# Patient Record
Sex: FEMALE | Race: WHITE | NOT HISPANIC OR LATINO | Employment: FULL TIME | URBAN - METROPOLITAN AREA
[De-identification: names, ages, dates, MRNs, and addresses within clinical notes are randomized per-mention and may not be internally consistent; named-entity substitution may affect disease eponyms.]

---

## 2017-02-03 ENCOUNTER — PATIENT MESSAGE (OUTPATIENT)
Dept: DERMATOLOGY | Facility: CLINIC | Age: 37
End: 2017-02-03

## 2017-02-03 DIAGNOSIS — L71.9 ROSACEA: ICD-10-CM

## 2017-02-03 RX ORDER — DOXYCYCLINE HYCLATE 50 MG/1
50 CAPSULE ORAL DAILY
Qty: 30 CAPSULE | Refills: 3 | Status: SHIPPED | OUTPATIENT
Start: 2017-02-03 | End: 2017-03-02 | Stop reason: SDUPTHER

## 2017-02-03 RX ORDER — IVERMECTIN 10 MG/G
1 CREAM TOPICAL DAILY
Qty: 30 G | Refills: 3 | Status: SHIPPED | OUTPATIENT
Start: 2017-02-03 | End: 2017-03-02 | Stop reason: SDUPTHER

## 2017-03-01 ENCOUNTER — PATIENT MESSAGE (OUTPATIENT)
Dept: DERMATOLOGY | Facility: CLINIC | Age: 37
End: 2017-03-01

## 2017-03-02 DIAGNOSIS — L71.9 ROSACEA: ICD-10-CM

## 2017-03-02 RX ORDER — DOXYCYCLINE HYCLATE 50 MG/1
50 CAPSULE ORAL DAILY
Qty: 30 CAPSULE | Refills: 3 | Status: SHIPPED | OUTPATIENT
Start: 2017-03-02 | End: 2017-10-26

## 2017-03-02 RX ORDER — IVERMECTIN 10 MG/G
1 CREAM TOPICAL DAILY
Qty: 30 G | Refills: 3 | Status: SHIPPED | OUTPATIENT
Start: 2017-03-02 | End: 2017-10-26

## 2017-03-03 ENCOUNTER — TELEPHONE (OUTPATIENT)
Dept: DERMATOLOGY | Facility: CLINIC | Age: 37
End: 2017-03-03

## 2017-04-18 ENCOUNTER — OFFICE VISIT (OUTPATIENT)
Dept: OBSTETRICS AND GYNECOLOGY | Facility: CLINIC | Age: 37
End: 2017-04-18
Payer: COMMERCIAL

## 2017-04-18 ENCOUNTER — PROCEDURE VISIT (OUTPATIENT)
Dept: OBSTETRICS AND GYNECOLOGY | Facility: CLINIC | Age: 37
End: 2017-04-18
Payer: COMMERCIAL

## 2017-04-18 ENCOUNTER — LAB VISIT (OUTPATIENT)
Dept: LAB | Facility: OTHER | Age: 37
End: 2017-04-18
Attending: NURSE PRACTITIONER
Payer: COMMERCIAL

## 2017-04-18 VITALS
DIASTOLIC BLOOD PRESSURE: 68 MMHG | BODY MASS INDEX: 21.79 KG/M2 | SYSTOLIC BLOOD PRESSURE: 108 MMHG | HEIGHT: 66 IN | WEIGHT: 135.56 LBS

## 2017-04-18 DIAGNOSIS — Z36.82 ENCOUNTER FOR NUCHAL TRANSLUCENCY TESTING: ICD-10-CM

## 2017-04-18 DIAGNOSIS — N91.5 OLIGOMENORRHEA: ICD-10-CM

## 2017-04-18 DIAGNOSIS — Z11.51 SCREENING FOR HPV (HUMAN PAPILLOMAVIRUS): ICD-10-CM

## 2017-04-18 DIAGNOSIS — Z12.4 PAP SMEAR FOR CERVICAL CANCER SCREENING: ICD-10-CM

## 2017-04-18 DIAGNOSIS — N91.5 OLIGOMENORRHEA: Primary | ICD-10-CM

## 2017-04-18 DIAGNOSIS — Z36.89 ESTABLISH GESTATIONAL AGE, ULTRASOUND: ICD-10-CM

## 2017-04-18 DIAGNOSIS — Z36.9 ANTENATAL SCREENING ENCOUNTER: ICD-10-CM

## 2017-04-18 LAB
ABO + RH BLD: NORMAL
ANION GAP SERPL CALC-SCNC: 6 MMOL/L
BASOPHILS # BLD AUTO: 0.02 K/UL
BASOPHILS NFR BLD: 0.3 %
BLD GP AB SCN CELLS X3 SERPL QL: NORMAL
BUN SERPL-MCNC: 11 MG/DL
CALCIUM SERPL-MCNC: 8.8 MG/DL
CHLORIDE SERPL-SCNC: 106 MMOL/L
CO2 SERPL-SCNC: 27 MMOL/L
CREAT SERPL-MCNC: 0.7 MG/DL
DIFFERENTIAL METHOD: ABNORMAL
EOSINOPHIL # BLD AUTO: 0.1 K/UL
EOSINOPHIL NFR BLD: 1 %
ERYTHROCYTE [DISTWIDTH] IN BLOOD BY AUTOMATED COUNT: 12.4 %
EST. GFR  (AFRICAN AMERICAN): >60 ML/MIN/1.73 M^2
EST. GFR  (NON AFRICAN AMERICAN): >60 ML/MIN/1.73 M^2
GLUCOSE SERPL-MCNC: 79 MG/DL
HCT VFR BLD AUTO: 36.7 %
HGB BLD-MCNC: 12.5 G/DL
LYMPHOCYTES # BLD AUTO: 1.5 K/UL
LYMPHOCYTES NFR BLD: 21.7 %
MCH RBC QN AUTO: 29.6 PG
MCHC RBC AUTO-ENTMCNC: 34.1 %
MCV RBC AUTO: 87 FL
MONOCYTES # BLD AUTO: 0.5 K/UL
MONOCYTES NFR BLD: 7.3 %
NEUTROPHILS # BLD AUTO: 4.8 K/UL
NEUTROPHILS NFR BLD: 69.1 %
PLATELET # BLD AUTO: 228 K/UL
PMV BLD AUTO: 9.8 FL
POTASSIUM SERPL-SCNC: 3.6 MMOL/L
RBC # BLD AUTO: 4.22 M/UL
SODIUM SERPL-SCNC: 139 MMOL/L
WBC # BLD AUTO: 6.96 K/UL

## 2017-04-18 PROCEDURE — 87340 HEPATITIS B SURFACE AG IA: CPT

## 2017-04-18 PROCEDURE — 36415 COLL VENOUS BLD VENIPUNCTURE: CPT

## 2017-04-18 PROCEDURE — 85025 COMPLETE CBC W/AUTO DIFF WBC: CPT

## 2017-04-18 PROCEDURE — 99214 OFFICE O/P EST MOD 30 MIN: CPT | Mod: S$GLB,,, | Performed by: NURSE PRACTITIONER

## 2017-04-18 PROCEDURE — 87624 HPV HI-RISK TYP POOLED RSLT: CPT

## 2017-04-18 PROCEDURE — 87591 N.GONORRHOEAE DNA AMP PROB: CPT

## 2017-04-18 PROCEDURE — 86703 HIV-1/HIV-2 1 RESULT ANTBDY: CPT

## 2017-04-18 PROCEDURE — 87086 URINE CULTURE/COLONY COUNT: CPT

## 2017-04-18 PROCEDURE — 86900 BLOOD TYPING SEROLOGIC ABO: CPT

## 2017-04-18 PROCEDURE — 86901 BLOOD TYPING SEROLOGIC RH(D): CPT

## 2017-04-18 PROCEDURE — 86762 RUBELLA ANTIBODY: CPT

## 2017-04-18 PROCEDURE — 76817 TRANSVAGINAL US OBSTETRIC: CPT | Mod: S$GLB,,, | Performed by: OBSTETRICS & GYNECOLOGY

## 2017-04-18 PROCEDURE — 99999 PR PBB SHADOW E&M-EST. PATIENT-LVL III: CPT | Mod: PBBFAC,,, | Performed by: NURSE PRACTITIONER

## 2017-04-18 PROCEDURE — 80048 BASIC METABOLIC PNL TOTAL CA: CPT

## 2017-04-18 PROCEDURE — 1160F RVW MEDS BY RX/DR IN RCRD: CPT | Mod: S$GLB,,, | Performed by: NURSE PRACTITIONER

## 2017-04-18 PROCEDURE — 88175 CYTOPATH C/V AUTO FLUID REDO: CPT

## 2017-04-18 PROCEDURE — 86592 SYPHILIS TEST NON-TREP QUAL: CPT

## 2017-04-18 PROCEDURE — 30000890 ARUP MISCELLANEOUS TEST 1

## 2017-04-18 NOTE — PROCEDURES
Procedures   Obstetrical ultrasound completed today.  See report in imaging section of [facility].

## 2017-04-18 NOTE — MR AVS SNAPSHOT
[facility] [address]  [address]  [facility] [address]  Phone: [phone]  Fax: [phone]                  Ann-Marie Saavedra   2017 1:00 PM   Office Visit    Description:  Female : 1980   Provider:  Nevin Plunkett NP   Department:  [facility] [address]           Reason for Visit     Oligomenorrhea                To Do List           Future Appointments        Provider Department Dept Phone    2017 1:30 PM ULTRASOUND, [facility] GYN [facility] [address] [phone]      Goals (5 Years of Data)     None      [facility] On [facility]     [facility] On Call Nurse Care Line -  Assistance  Unless otherwise directed by your provider, please contact [facility], our nurse care line that is available for  assistance.     Registered nurses in the [facility] On Call Center provide: appointment scheduling, clinical advisement, health education, and other advisory services.  Call: [phone] (toll free)               Medications           Message regarding Medications     Verify the changes and/or additions to your medication regime listed below are the same as discussed with your clinician today.  If any of these changes or additions are incorrect, please notify your healthcare provider.             Verify that the below list of medications is an accurate representation of the medications you are currently taking.  If none reported, the list may be blank. If incorrect, please contact your healthcare provider. Carry this list with you in case of emergency.           Current Medications     docusate sodium (COLACE) 100 MG capsule Take 1 capsule (100 mg total) by mouth 2 (two) times daily.    doxycycline (VIBRAMYCIN) 50 MG capsule Take 1 capsule (50 mg total) by mouth once daily.    FINACEA 15 % cream Apply topically once daily.    iron polysaccharides (NIFEREX) 150 mg iron Cap Take 1 capsule (150 mg total) by mouth 2 (two) times daily.    ivermectin (SOOLANTRA)  "1 % Crea Apply 1 application topically once daily.    naproxen (NAPROSYN) 500 MG tablet Take 1 tablet (500 mg total) by mouth every 8 (eight) hours as needed (Cramping).    norethindrone (ORTHO MICRONOR) 0.35 mg tablet Take 1 tablet (0.35 mg total) by mouth once daily.    norethindrone-ethinyl estradiol (MICROGESTIN 1/20) 1-20 mg-mcg per tablet Take 1 tablet by mouth once daily.    oxycodone-acetaminophen (PERCOCET) 5-325 mg per tablet Take 1 tablet by mouth every 6 (six) hours as needed.    PRENATAL VIT37/IRON/FOLIC ACID (PRENATA ORAL) Take by mouth.    triamcinolone acetonide 0.1% (KENALOG) 0.1 % ointment Apply to affected area 2-3 times daily           Clinical Reference Information           Your Vitals Were     BP Height Weight Last Period BMI    108/68 5' 6" (1.676 m) 61.5 kg (135 lb 9.3 oz) 02/15/2017 21.88 kg/m2      Blood Pressure          Most Recent Value    BP  108/68      Allergies as of 4/18/2017     No Known Allergies      Immunizations Administered on Date of Encounter - 4/18/2017     None      Language Assistance Services     ATTENTION: Language assistance services are available, free of charge. Please call [phone].      ATENCIÓN: Si habla hollycarmen, tiene a pham disposición servicios gratuitos de asistencia lingüística. Llame al [phone].     MICHELLE Ý: N?u b?n nói Ti?ng Vi?t, có các d?ch v? h? tr? ngôn ng? mi?n phí dành cho b?n. G?i s? [phone].         [facility] [address] complies with applicable Federal civil rights laws and does not discriminate on the basis of race, color, national origin, age, disability, or sex.        "

## 2017-04-18 NOTE — PROGRESS NOTES
"  CC: Positive Pregnancy Test    HISTORY OF PRESENT ILLNESS:    Ann-Marie Saavedra is a 36 y.o. female, ,  Presents today for a routine exam complaining of amenorrhea and positive home urine pregnancy test.  Patient's last menstrual period was 02/15/2017.   She is not currently on any contraception.  Reports nausea- no vomiting. Denies breast tenderness. Denies vaginal bleeding and pelvic pain.  Prior  X1- full term/ uncomplicated pregnancy.  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      ROS:  GENERAL: No weight changes. No swelling. No fatigue. No fever.  CARDIOVASCULAR: No chest pain. No shortness of breath. No leg cramps.   NEUROLOGICAL: No headaches. No vision changes.  BREASTS: No pain. No lumps. No discharge.  ABDOMEN: No pain. No diarrhea. No constipation.  REPRODUCTIVE: No abnormal bleeding.   VULVA: No pain. No lesions. No itching.  VAGINA: No relaxation. No itching. No odor. No discharge. No lesions.  URINARY: No incontinence. No nocturia. No frequency. No dysuria.    MEDICATIONS AND ALLERGIES:  Reviewed        COMPREHENSIVE GYN HISTORY:  PAP History: Denies abnormal Paps.  Infection History: Denies STDs. Denies PID.  Benign History: Denies uterine fibroids. Denies ovarian cysts. Denies endometriosis. Denies other conditions.  Cancer History: Denies cervical cancer. Denies uterine cancer or hyperplasia. Denies ovarian cancer. Denies vulvar cancer or pre-cancer. Denies vaginal cancer or pre-cancer. Denies breast cancer. Denies colon cancer.  Sexual Activity History: Reports currently being sexually active  Menstrual History: None.  Contraception: None    /68  Ht 5' 6" (1.676 m)  Wt 61.5 kg (135 lb 9.3 oz)  LMP 02/15/2017  BMI 21.88 kg/m2    PE:  AFFECT: Calm, alert and oriented X 3. Interactive during exam  GENERAL: Appears well-nourished, well-developed, in no acute distress.  HEAD: Normocephalic, atruamatic  TEETH: Good dentition.  THYROID: No thyromegally   BREASTS: No masses, " skin changes, nipple discharge or adenopathy bilaterally.  SKIN: Normal for race, warm, & dry. No lesions or rashes.  LUNGS: Easy and unlabored, clear to auscultation bilaterally.  HEART: Regular rate and rhythm   ABDOMEN: Soft and nontender without masses or organomegally.  VULVA: No lesions, masses or tenderness.  VAGINA: Moist and well rugated without lesions or discharge.  CERVIX: Moist and pink without lesions, discharge or tenderness.      UTERUS SIZE: 8 week size, nontender and without masses.  ADNEXA: No masses or tenderness.  ESTIMATE OF PELVIC CAPACITY: Adequate  EXTREMITIES: No cyanosis, clubbing or edema. No calf tenderness.  LYMPH NODES: No axillary or inguinal adenopathy.    PROCEDURES:  UPT Positive  Genprobe  Pap      ASSESSMENT/PLAN:  Amenorrhea  Positive urine pregnancy test (DAVID: 17, EGA: 8w6d based on LMP)    -  Routine prenatal care    Nausea and vomiting in pregnancy    -  Education regarding lifestyle and dietary modifications    -  Advised use of B6/Unisom. Pt will notify us if no relief/worsening symptoms, will consider Zofran if needed.      1st TRIMESTER COUNSELING: Discussed all, booklet provided:  Common complaints of pregnancy  HIV and other routine prenatal tests including  genetic screening  Risk factors identified by prenatal history  Oriented to practice - discussed anticipated course of prenatal care & indications for Ultrasound  Childbirth classes/Hospital facilities   Nutrition and weight gain counseling  Toxoplasmosis precautions (Cats/Raw Meat)  Sexual activity and exercise  Environmental/Work hazards  Travel  Tobacco (Ask, Advise, Assess, Assist, and Arrange), as well as alcohol and drug use  Use of any medications (Including supplements, Vitamins, Herbs, or OTC Drugs)  Domestic violence  Seat belt use      TERATOLOGY COUNSELING:   Discussed indications and options for aneuploidy screening - pamphlets given    -  Pt desires NT and orders placed     Dating US  later today  FOLLOW-UP in 4 weeks with Dr. Mary Plunkett, NP    OB/GYN

## 2017-04-19 LAB
C TRACH DNA SPEC QL NAA+PROBE: NOT DETECTED
HBV SURFACE AG SERPL QL IA: NEGATIVE
HIV 1+2 AB+HIV1 P24 AG SERPL QL IA: NEGATIVE
N GONORRHOEA DNA SPEC QL NAA+PROBE: NOT DETECTED
RPR SER QL: NORMAL

## 2017-04-20 LAB
BACTERIA UR CULT: NORMAL
RUBV IGG SER-ACNC: NORMAL IU/ML
RUBV IGG SER-IMP: NORMAL

## 2017-04-23 LAB — ARUP MISCELLANEOUS TEST 1: NORMAL

## 2017-04-24 LAB
HPV16 DNA SPEC QL NAA+PROBE: NEGATIVE
HPV16+18+H RISK 12 DNA CVX-IMP: NEGATIVE
HPV18 DNA SPEC QL NAA+PROBE: NEGATIVE

## 2017-05-02 ENCOUNTER — TELEPHONE (OUTPATIENT)
Dept: OBSTETRICS AND GYNECOLOGY | Facility: CLINIC | Age: 37
End: 2017-05-02

## 2017-05-11 ENCOUNTER — LAB VISIT (OUTPATIENT)
Dept: LAB | Facility: OTHER | Age: 37
End: 2017-05-11
Attending: OBSTETRICS & GYNECOLOGY
Payer: COMMERCIAL

## 2017-05-11 ENCOUNTER — OFFICE VISIT (OUTPATIENT)
Dept: MATERNAL FETAL MEDICINE | Facility: CLINIC | Age: 37
End: 2017-05-11
Payer: COMMERCIAL

## 2017-05-11 ENCOUNTER — RESEARCH ENCOUNTER (OUTPATIENT)
Dept: RESEARCH | Facility: HOSPITAL | Age: 37
End: 2017-05-11

## 2017-05-11 DIAGNOSIS — Z36.82 ENCOUNTER FOR (NT) NUCHAL TRANSLUCENCY SCAN: ICD-10-CM

## 2017-05-11 DIAGNOSIS — Z36.89 ENCOUNTER FOR FETAL ANATOMIC SURVEY: Primary | ICD-10-CM

## 2017-05-11 DIAGNOSIS — O09.521 ELDERLY MULTIGRAVIDA IN FIRST TRIMESTER: ICD-10-CM

## 2017-05-11 DIAGNOSIS — Z36.82 ENCOUNTER FOR NUCHAL TRANSLUCENCY TESTING: ICD-10-CM

## 2017-05-11 PROCEDURE — 99499 UNLISTED E&M SERVICE: CPT | Mod: S$GLB,,, | Performed by: PEDIATRICS

## 2017-05-11 PROCEDURE — 81508 FTL CGEN ABNOR TWO PROTEINS: CPT

## 2017-05-11 PROCEDURE — 76801 OB US < 14 WKS SINGLE FETUS: CPT | Mod: S$GLB,,, | Performed by: PEDIATRICS

## 2017-05-11 PROCEDURE — 36415 COLL VENOUS BLD VENIPUNCTURE: CPT

## 2017-05-11 PROCEDURE — 76813 OB US NUCHAL MEAS 1 GEST: CPT | Mod: S$GLB,,, | Performed by: PEDIATRICS

## 2017-05-11 NOTE — PROGRESS NOTES
2016.165.B TREETOP Consent.     I met with Ms. Saavedra. She is pregnant and here for a visit. I met her in [facility]. We discussed the study in detail, including the purpose, numbers/length, procedures, risk/benefit, cost/payment, contacts (investigators/IRB), alternatives/voluntary nature/withdrawal, confidentiality/HIPPA. Dr. Bello was available for questions. She verbalized understanding. She consented to optional blood storage and genetic sequencing. The consent form was signed on 5/11/17 at 8:30 am. Patient was given a copy of the signed informed consent.    She reported her only medication use is a prenatal vitamin.

## 2017-05-12 PROBLEM — O09.521 ELDERLY MULTIGRAVIDA IN FIRST TRIMESTER: Status: ACTIVE | Noted: 2017-05-12

## 2017-05-15 LAB
B-HCG (M.O.M.) (SS1): NORMAL
CRL MEASUREMENT (SS1): 52.3 MM
DOWN SYNDROME (<1:25) (SS1): NORMAL
DS BASED ON MAT. AGE (SS1): NORMAL
ETHNIC ORIGIN (SS1): NORMAL
GESTATIONAL AGE (DAYS)(SS1): 6
GESTATIONAL AGE (WEEKS)(SS1): 11
HCG (SS1): 88.5 IU/ML
INSULIN DEPEND. DIABETES (SS1): NORMAL
MATERNAL AGE AT EDD (YRS) (SS1): 35
MATERNAL WEIGHT (LBS) (SS1): 135
MULTIPLE GESTATION (SS1): NORMAL
NASAL BONE (SS1): NORMAL
NUCHAL TRANSL. (M.O.M.) (SS1): NORMAL
NUCHAL TRANSLUCENCY (SS1): 1 MM
PAPP-A (M.O.M.) (SS1): NORMAL
PAPP-A (SS1): 309.6 NG/ML
SEQ. SCREEN PART 1: NEGATIVE
SEQUENTIAL SCREEN I INTERP.: NORMAL
TRISOMY 18 (<1:100) (SS1): NORMAL
ULTRASOUND DATE (SS1): NORMAL

## 2017-05-18 ENCOUNTER — INITIAL PRENATAL (OUTPATIENT)
Dept: OBSTETRICS AND GYNECOLOGY | Facility: CLINIC | Age: 37
End: 2017-05-18
Payer: COMMERCIAL

## 2017-05-18 VITALS
DIASTOLIC BLOOD PRESSURE: 60 MMHG | BODY MASS INDEX: 22.17 KG/M2 | WEIGHT: 137.38 LBS | SYSTOLIC BLOOD PRESSURE: 100 MMHG

## 2017-05-18 DIAGNOSIS — Z34.80 SUPERVISION OF OTHER NORMAL PREGNANCY, ANTEPARTUM: Primary | ICD-10-CM

## 2017-05-18 PROCEDURE — 0500F INITIAL PRENATAL CARE VISIT: CPT | Mod: S$GLB,,, | Performed by: OBSTETRICS & GYNECOLOGY

## 2017-05-18 PROCEDURE — 99999 PR PBB SHADOW E&M-EST. PATIENT-LVL III: CPT | Mod: PBBFAC,,, | Performed by: OBSTETRICS & GYNECOLOGY

## 2017-05-18 NOTE — MR AVS SNAPSHOT
[facility] [address]  [address]  [facility] [address]  Phone: [phone]  Fax: [phone]                  Ann-Marie Saavedra   2017 8:30 AM   Initial Prenatal    Description:  Female : 1980   Provider:  Tali Hernandez MD   Department:  [facility] [address]           Reason for Visit     Initial Prenatal Visit                To Do List           Goals (5 Years of Data)     None      [facility]     [facility] On Call Nurse Care Line -  Assistance  Unless otherwise directed by your provider, please contact [facility], our nurse care line that is available for  assistance.     Registered nurses in the [facility] On Call Center provide: appointment scheduling, clinical advisement, health education, and other advisory services.  Call: [phone] (toll free)               Medications           Message regarding Medications     Verify the changes and/or additions to your medication regime listed below are the same as discussed with your clinician today.  If any of these changes or additions are incorrect, please notify your healthcare provider.             Verify that the below list of medications is an accurate representation of the medications you are currently taking.  If none reported, the list may be blank. If incorrect, please contact your healthcare provider. Carry this list with you in case of emergency.           Current Medications     PRENATAL VIT37/IRON/FOLIC ACID (PRENATA ORAL) Take by mouth.    docusate sodium (COLACE) 100 MG capsule Take 1 capsule (100 mg total) by mouth 2 (two) times daily.    doxycycline (VIBRAMYCIN) 50 MG capsule Take 1 capsule (50 mg total) by mouth once daily.    FINACEA 15 % cream Apply topically once daily.    iron polysaccharides (NIFEREX) 150 mg iron Cap Take 1 capsule (150 mg total) by mouth 2 (two) times daily.    ivermectin (SOOLANTRA) 1 % Crea Apply 1 application topically once daily.    naproxen  (NAPROSYN) 500 MG tablet Take 1 tablet (500 mg total) by mouth every 8 (eight) hours as needed (Cramping).    norethindrone (ORTHO MICRONOR) 0.35 mg tablet Take 1 tablet (0.35 mg total) by mouth once daily.    norethindrone-ethinyl estradiol (MICROGESTIN 1/20) 1-20 mg-mcg per tablet Take 1 tablet by mouth once daily.    oxycodone-acetaminophen (PERCOCET) 5-325 mg per tablet Take 1 tablet by mouth every 6 (six) hours as needed.    triamcinolone acetonide 0.1% (KENALOG) 0.1 % ointment Apply to affected area 2-3 times daily           Clinical Reference Information           Prenatal Vitals     Enc. Date GA Prenatal Vitals Prenatal Pulse Pain Level Urine Albumin/Glucose Edema Presentation Dilation/Effacement/Station    5/18/17 13w1d 100/60 / 62.3 kg (137 lb 5.6 oz)    Negative / Negative         Your Vitals Were     BP Weight Last Period BMI       100/60 62.3 kg (137 lb 5.6 oz) 02/15/2017 22.17 kg/m2       Allergies as of 5/18/2017     No Known Allergies      Immunizations Administered on Date of Encounter - 5/18/2017     None      Language Assistance Services     ATTENTION: Language assistance services are available, free of charge. Please call [phone].      ATENCIÓN: Si habla español, tiene a pham disposición servicios gratuitos de asistencia lingüística. Llame al [phone].     MICHELLE Ý: N?u b?n nói Ti?ng Vi?t, có các d?ch v? h? tr? ngôn ng? mi?n phí dành cho b?n. G?i s? [phone].         [facility] [address] complies with applicable Federal civil rights laws and does not discriminate on the basis of race, color, national origin, age, disability, or sex.

## 2017-06-15 ENCOUNTER — ROUTINE PRENATAL (OUTPATIENT)
Dept: OBSTETRICS AND GYNECOLOGY | Facility: CLINIC | Age: 37
End: 2017-06-15
Payer: COMMERCIAL

## 2017-06-15 ENCOUNTER — LAB VISIT (OUTPATIENT)
Dept: LAB | Facility: OTHER | Age: 37
End: 2017-06-15
Attending: OBSTETRICS & GYNECOLOGY
Payer: COMMERCIAL

## 2017-06-15 VITALS — WEIGHT: 142 LBS | BODY MASS INDEX: 22.92 KG/M2 | DIASTOLIC BLOOD PRESSURE: 60 MMHG | SYSTOLIC BLOOD PRESSURE: 108 MMHG

## 2017-06-15 DIAGNOSIS — Z36.9 ANTENATAL SCREENING ENCOUNTER: ICD-10-CM

## 2017-06-15 DIAGNOSIS — Z34.80 SUPERVISION OF OTHER NORMAL PREGNANCY, ANTEPARTUM: Primary | ICD-10-CM

## 2017-06-15 PROCEDURE — 99999 PR PBB SHADOW E&M-EST. PATIENT-LVL II: CPT | Mod: PBBFAC,,, | Performed by: OBSTETRICS & GYNECOLOGY

## 2017-06-15 PROCEDURE — 81511 FTL CGEN ABNOR FOUR ANAL: CPT

## 2017-06-15 PROCEDURE — 36415 COLL VENOUS BLD VENIPUNCTURE: CPT

## 2017-06-15 PROCEDURE — 0502F SUBSEQUENT PRENATAL CARE: CPT | Mod: S$GLB,,, | Performed by: OBSTETRICS & GYNECOLOGY

## 2017-06-15 NOTE — PROGRESS NOTES
Pt doing well. No vaginal bleeding, no loss of fluid, positive fetal movement, no contractions. Bleeding/labor/rom/fmc precautions.     SSII today. RTC 4 weeks.

## 2017-06-19 LAB
# FETUSES US: NORMAL
AFP MOM SERPL: 1.31
AFP SERPL-MCNC: 49.7 NG/ML
AGE AT DELIVERY: 35
B-HCG MOM SERPL: 1.34
B-HCG SERPL-ACNC: 40.4 IU/ML
COLLECT DATE BLD: NORMAL
COLLECT DATE: NORMAL
FET NASAL BONE LENGTH US.MEAS: NORMAL MM
FET NUCHAL FOLD MOM THICKNESS US.MEAS: 0.91
FET NUCHAL FOLD THICKNESS US.MEAS: 1 MM
FET TS 21 RISK FROM MAT AGE: NORMAL
GA (DAYS): 6 D
GA (WEEKS): 16 WK
GA METHOD: NORMAL
GEST. AGE (DAYS) 2ND SAMPLE (SS2): 6
GEST. AGE (WKS) 1ST SAMPLE (SS2): 11
IDDM PATIENT QL: NORMAL
INHIBIN A MOM SERPL: 1
INHIBIN A SERPL-MCNC: 169.9 PG/ML
INTEGRATED SCN PATIENT-IMP: NEGATIVE
PAPP-A MOM SERPL: 0.43
PAPP-A SERPL-MCNC: 309.6 NG/ML
SEQUENTIAL SCREEN PART 2 INTERP: NORMAL
TS 18 RISK FETUS: NORMAL
TS 21 RISK FETUS: NORMAL
U ESTRIOL MOM SERPL: 1.08
U ESTRIOL SERPL-MCNC: 1.03 NG/ML

## 2017-06-30 ENCOUNTER — TELEPHONE (OUTPATIENT)
Dept: RESEARCH | Facility: HOSPITAL | Age: 37
End: 2017-06-30

## 2017-07-06 ENCOUNTER — OFFICE VISIT (OUTPATIENT)
Dept: MATERNAL FETAL MEDICINE | Facility: CLINIC | Age: 37
End: 2017-07-06
Payer: COMMERCIAL

## 2017-07-06 ENCOUNTER — RESEARCH ENCOUNTER (OUTPATIENT)
Dept: RESEARCH | Facility: HOSPITAL | Age: 37
End: 2017-07-06

## 2017-07-06 DIAGNOSIS — O09.523 ELDERLY MULTIGRAVIDA IN THIRD TRIMESTER: Primary | ICD-10-CM

## 2017-07-06 DIAGNOSIS — Z36.89 ENCOUNTER FOR FETAL ANATOMIC SURVEY: ICD-10-CM

## 2017-07-06 DIAGNOSIS — O09.522 ELDERLY MULTIGRAVIDA, SECOND TRIMESTER: ICD-10-CM

## 2017-07-06 PROCEDURE — 99499 UNLISTED E&M SERVICE: CPT | Mod: S$GLB,,, | Performed by: OBSTETRICS & GYNECOLOGY

## 2017-07-06 PROCEDURE — 76811 OB US DETAILED SNGL FETUS: CPT | Mod: S$GLB,,, | Performed by: OBSTETRICS & GYNECOLOGY

## 2017-07-06 NOTE — PROGRESS NOTES
2016.165.B [facility]      I drew Ms. Saavedra's blood in [facility]. After Ms. Saavedra's blood was drawn, she was given her $25.00 gift card.     Blood was drawn at 08:25. Placed in centrifuge at room temperature at 09:00. Minimal hemolysis in both tubes. Tubes were combined at 09:11. Twenty 250 ul aliquots were place in the -80 freezer at 09:16.    Only medications being used are prenatal vitamins.

## 2017-07-06 NOTE — PROGRESS NOTES
OB Ultrasound Report (see PDF version under imaging tab)    Indication  ========    Fetal anatomy survey.    History  ======    Risk Factors  Details: Advanced Maternal Age    Pregnancy History  ===============    Maternal Lab Tests  Test: sequential screen  Result:  Negative (DSR 1:40,000)    Wants to know gender: no    Method  ======    Transabdominal ultrasound examination, [place] E10. View: Good view.    Pregnancy  =========    [place]ham pregnancy. Number of fetuses: 1.    Dating  ======    LMP on: 2/15/2017  Cycle: regular cycle  GA by LMP 20 w + 1 d  DAVID by LMP: 11/22/2017  Ultrasound examination on: 7/6/2017  GA by U/S based upon: AC, BPD, Femur, HC  GA by U/S 19 w + 5 d  DAVID by U/S: 11/25/2017  Assigned: Dating performed on 04/18/2017, based on ultrasound (CRL)  Assigned GA 19 w + 2 d  Assigned DAVID: 11/28/2017    General Evaluation  ===============    Cardiac activity: present.  bpm.  Fetal movements: visualized.  Presentation: breech.  Placenta: anterior.  Umbilical cord: 3 vessel cord, normal.  Amniotic fluid: normal amount.    Fetal Biometry  ===========    Fetal Biometry  BPD 43.8 mm 19w 2d Hadlock  OFD 58.0 mm 20w 2d James  .5 mm 19w 0d Hadlock  .2 mm 20w 6d Hadlock  Femur 30.6 mm 19w 3d Hadlock  Cerebellum tr 18.8 mm 19w 0d Flanagan  CM 3.0 mm  Nuchal fold 4.47 mm  Humerus 29.2 mm 19w 4d James   g  Calculated by: Hadlock (BPD-HC-AC-FL)  EFW (lb) 0 lb  EFW (oz) 12 oz  Cephalic index 0.76  HC / AC 1.03  FL / BPD 0.70  FL / AC 0.19   bpm  Head / Face / Neck   6.6 mm  Nasal bone 6.6 mm    Fetal Anatomy  ===========    Cranium: normal  Midline falx: normal  Cavum septi pellucidi: normal  Cerebellum: normal  Cisterna magna: normal  Head shape: normal  Rt lateral ventricle: normal  Lt lateral ventricle: normal  Rt choroid plexus: normal  Lt choroid plexus: normal  Parenchyma: normal  Third ventricle: normal  Posterior fossa: normal  Cerebellar  lobes: normal  Vermis: normal  Neck: appears normal  Nuchal fold: normal  Lips: normal  Profile: normal  Nose: normal  Maxilla: normal  Mandible: normal  4-chamber view: normal  RVOT: normal  LVOT: normal  Heart / Thorax: Septal views: normal  Situs: normal  Aortic arch: normal  Ductal arch: normal  SVC: normal  IVC: normal  3-vessel view: normal  3-vessel-trachea view: normal  Cardiac position: normal  Cardiac axis: normal  Cardiac size: normal  Cardiac rhythm: normal  Rt lung: normal  Lt lung: normal  Diaphragm: normal  Cord insertion: normal  Stomach: normal  Bladder: normal  Genitals: normal  Abdom. wall: appears normal  Abdom. cavity: normal  Rt kidney: normal  Lt kidney: normal  Liver: normal  Bowel: normal  Cervical spine: normal  Thoracic spine: normal  Lumbar spine: normal  Sacral spine: normal  Rt arm: normal  Lt arm: normal  Rt hand: normal  Lt hand: normal  Rt leg: normal  Lt leg: normal  Rt foot: normal  Lt foot: normal  Position of hands: normal  Position of feet: normal  Wants to know gender: no    Maternal Structures  ===============    Uterus / Cervix  Cervical length 33.5 mm  Other: Uterus and adnexa normal    Impression  =========    A detailed fetal anatomic ultrasound examination was performed today due to the following high risk indication: advanced maternal age  at delivery. No fetal structural abnormalities are identified today, and fetal size is appropriate for EGA. Cervical length is normal.  Placental location is normal without evidence of previa.

## 2017-07-10 ENCOUNTER — ROUTINE PRENATAL (OUTPATIENT)
Dept: OBSTETRICS AND GYNECOLOGY | Facility: CLINIC | Age: 37
End: 2017-07-10
Payer: COMMERCIAL

## 2017-07-10 VITALS
WEIGHT: 145.75 LBS | BODY MASS INDEX: 23.52 KG/M2 | SYSTOLIC BLOOD PRESSURE: 106 MMHG | DIASTOLIC BLOOD PRESSURE: 60 MMHG

## 2017-07-10 DIAGNOSIS — Z34.80 SUPERVISION OF OTHER NORMAL PREGNANCY, ANTEPARTUM: Primary | ICD-10-CM

## 2017-07-10 PROCEDURE — 0502F SUBSEQUENT PRENATAL CARE: CPT | Mod: S$GLB,,, | Performed by: OBSTETRICS & GYNECOLOGY

## 2017-07-10 PROCEDURE — 99999 PR PBB SHADOW E&M-EST. PATIENT-LVL II: CPT | Mod: PBBFAC,,, | Performed by: OBSTETRICS & GYNECOLOGY

## 2017-07-10 NOTE — PROGRESS NOTES
Pt doing well. No vaginal bleeding, no loss of fluid, positive fetal movement, no contractions. Bleeding/labor/rom/fmc precautions.     DM screen next visit.

## 2017-08-07 ENCOUNTER — ROUTINE PRENATAL (OUTPATIENT)
Dept: OBSTETRICS AND GYNECOLOGY | Facility: CLINIC | Age: 37
End: 2017-08-07
Payer: COMMERCIAL

## 2017-08-07 ENCOUNTER — LAB VISIT (OUTPATIENT)
Dept: LAB | Facility: HOSPITAL | Age: 37
End: 2017-08-07
Attending: OBSTETRICS & GYNECOLOGY
Payer: COMMERCIAL

## 2017-08-07 VITALS — WEIGHT: 146.81 LBS | BODY MASS INDEX: 23.7 KG/M2 | SYSTOLIC BLOOD PRESSURE: 94 MMHG | DIASTOLIC BLOOD PRESSURE: 62 MMHG

## 2017-08-07 DIAGNOSIS — Z34.80 SUPERVISION OF OTHER NORMAL PREGNANCY, ANTEPARTUM: Primary | ICD-10-CM

## 2017-08-07 DIAGNOSIS — Z34.80 SUPERVISION OF OTHER NORMAL PREGNANCY, ANTEPARTUM: ICD-10-CM

## 2017-08-07 DIAGNOSIS — Z23 NEED FOR DIPHTHERIA-TETANUS-PERTUSSIS (TDAP) VACCINE: ICD-10-CM

## 2017-08-07 LAB — GLUCOSE SERPL-MCNC: 112 MG/DL

## 2017-08-07 PROCEDURE — 82950 GLUCOSE TEST: CPT

## 2017-08-07 PROCEDURE — 36415 COLL VENOUS BLD VENIPUNCTURE: CPT | Mod: PO

## 2017-08-07 PROCEDURE — 0502F SUBSEQUENT PRENATAL CARE: CPT | Mod: S$GLB,,, | Performed by: OBSTETRICS & GYNECOLOGY

## 2017-08-07 PROCEDURE — 99999 PR PBB SHADOW E&M-EST. PATIENT-LVL III: CPT | Mod: PBBFAC,,, | Performed by: OBSTETRICS & GYNECOLOGY

## 2017-08-07 NOTE — PROGRESS NOTES
Pt doing well. No vaginal bleeding, no loss of fluid, positive fetal movement, no contractions. Bleeding/labor/rom/fmc precautions.     DM screen today. Tdap next visit. RTC 4 weeks.

## 2017-09-07 ENCOUNTER — CLINICAL SUPPORT (OUTPATIENT)
Dept: OBSTETRICS AND GYNECOLOGY | Facility: CLINIC | Age: 37
End: 2017-09-07
Payer: COMMERCIAL

## 2017-09-07 ENCOUNTER — ROUTINE PRENATAL (OUTPATIENT)
Dept: OBSTETRICS AND GYNECOLOGY | Facility: CLINIC | Age: 37
End: 2017-09-07
Payer: COMMERCIAL

## 2017-09-07 VITALS
DIASTOLIC BLOOD PRESSURE: 64 MMHG | BODY MASS INDEX: 24.59 KG/M2 | SYSTOLIC BLOOD PRESSURE: 100 MMHG | WEIGHT: 152.31 LBS

## 2017-09-07 DIAGNOSIS — Z23 NEED FOR DIPHTHERIA-TETANUS-PERTUSSIS (TDAP) VACCINE: ICD-10-CM

## 2017-09-07 DIAGNOSIS — Z34.80 SUPERVISION OF OTHER NORMAL PREGNANCY, ANTEPARTUM: Primary | ICD-10-CM

## 2017-09-07 PROCEDURE — 0502F SUBSEQUENT PRENATAL CARE: CPT | Mod: S$GLB,,, | Performed by: OBSTETRICS & GYNECOLOGY

## 2017-09-07 PROCEDURE — 90471 IMMUNIZATION ADMIN: CPT | Mod: S$GLB,,, | Performed by: OBSTETRICS & GYNECOLOGY

## 2017-09-07 PROCEDURE — 90715 TDAP VACCINE 7 YRS/> IM: CPT | Mod: S$GLB,,, | Performed by: OBSTETRICS & GYNECOLOGY

## 2017-09-07 PROCEDURE — 99999 PR PBB SHADOW E&M-EST. PATIENT-LVL III: CPT | Mod: PBBFAC,,, | Performed by: OBSTETRICS & GYNECOLOGY

## 2017-09-07 PROCEDURE — 99999 PR PBB SHADOW E&M-EST. PATIENT-LVL II: CPT | Mod: PBBFAC,,,

## 2017-09-07 NOTE — PROGRESS NOTES
Pt doing well. No vaginal bleeding, no loss of fluid, positive fetal movement, no contractions. Bleeding/labor/rom/fmc precautions. Feeling more pressure.     Tdap today. RTC 3-4 weeks.

## 2017-10-05 ENCOUNTER — ROUTINE PRENATAL (OUTPATIENT)
Dept: OBSTETRICS AND GYNECOLOGY | Facility: CLINIC | Age: 37
End: 2017-10-05
Payer: COMMERCIAL

## 2017-10-05 ENCOUNTER — OFFICE VISIT (OUTPATIENT)
Dept: MATERNAL FETAL MEDICINE | Facility: CLINIC | Age: 37
End: 2017-10-05
Attending: OBSTETRICS & GYNECOLOGY
Payer: COMMERCIAL

## 2017-10-05 VITALS
DIASTOLIC BLOOD PRESSURE: 64 MMHG | SYSTOLIC BLOOD PRESSURE: 102 MMHG | BODY MASS INDEX: 24.87 KG/M2 | WEIGHT: 154.13 LBS

## 2017-10-05 DIAGNOSIS — O09.523 ELDERLY MULTIGRAVIDA IN THIRD TRIMESTER: ICD-10-CM

## 2017-10-05 DIAGNOSIS — Z34.80 SUPERVISION OF OTHER NORMAL PREGNANCY, ANTEPARTUM: Primary | ICD-10-CM

## 2017-10-05 PROCEDURE — 99499 UNLISTED E&M SERVICE: CPT | Mod: S$GLB,,, | Performed by: OBSTETRICS & GYNECOLOGY

## 2017-10-05 PROCEDURE — 0502F SUBSEQUENT PRENATAL CARE: CPT | Mod: S$GLB,,, | Performed by: OBSTETRICS & GYNECOLOGY

## 2017-10-05 PROCEDURE — 76816 OB US FOLLOW-UP PER FETUS: CPT | Mod: S$GLB,,, | Performed by: OBSTETRICS & GYNECOLOGY

## 2017-10-05 PROCEDURE — 99999 PR PBB SHADOW E&M-EST. PATIENT-LVL II: CPT | Mod: PBBFAC,,, | Performed by: OBSTETRICS & GYNECOLOGY

## 2017-10-05 NOTE — PROGRESS NOTES
Pt doing well. No vaginal bleeding, no loss of fluid, positive fetal movement, no contractions. Bleeding/labor/rom/fmc precautions. Labs, SPADD next visit. RTC 3 weeks.

## 2017-10-05 NOTE — PROGRESS NOTES
OB Ultrasound Report (see PDF version under imaging tab)    Indication  ========    Evaluation of fetal growth.    History  ======    Risk Factors  Details: Advanced Maternal Age    Pregnancy History  ===============    Maternal Lab Tests  Test: sequential screen  Result:  Negative (DSR 1:40,000)    Wants to know gender: no    Method  ======    Transabdominal ultrasound examination.    Pregnancy  =========    [place] pregnancy. Number of fetuses: 1.    Dating  ======    LMP on: 2/15/2017  Cycle: regular cycle  GA by LMP 33 w + 1 d  DAVID by LMP: 11/22/2017  Ultrasound examination on: 10/5/2017  GA by U/S based upon: AC, BPD, Femur, HC  GA by U/S 32 w + 5 d  DAVID by U/S: 11/25/2017  Assigned: Dating performed on 04/18/2017, based on ultrasound (CRL)  Assigned GA 32 w + 2 d  Assigned DAVID: 11/28/2017    General Evaluation  ===============    Cardiac activity: present.  bpm.  Fetal movements: visualized.  Presentation: cephalic.  Placenta: anterior.  Umbilical cord: 3 vessel cord.  Amniotic fluid: Amount of AF: normal amount. MVP 4.9 cm.    Fetal Biometry  ===========    Fetal Biometry  BPD 81.2 mm 32w 4d Hadlock  .7 mm 35w 6d James  .4 mm 33w 6d Hadlock  .8 mm 32w 4d Hadlock  Femur 60.8 mm 31w 4d Hadlock  EFW 1,961 g 29% Prabhu  Calculated by: Hadlock (BPD-HC-AC-FL)  EFW (lb) 4 lb  EFW (oz) 5 oz  Cephalic index 0.75  HC / AC 1.07  FL / BPD 0.75  FL / AC 0.21  MVP 4.9 cm   bpm  Head / Face / Neck   1.9 mm    Fetal Anatomy  ===========    Cranium: normal  Posterior fossa: normal  4-chamber view: normal  Stomach: normal  Kidneys: normal  Bladder: normal  Wants to know gender: no  Other: A full anatomy survey previously performed.    Impression  =========    Fetal size is AGA with the EFW at the 29th percentile.  Normal repeat limited fetal anatomic survey. AFV is normal. Follow-up ultrasound as clinically indicated.

## 2017-10-05 NOTE — LETTER
October 5, 2017      Tali Hernandez MD  [address] [address]           [facility]  [address]  [facility] [address]  Phone: [phone]          Patient: Ann-Marie Saavedra   MR Number: [id number]   YOB: 1980   Date of Visit: 10/5/2017       Dear Dr. Tali Hernandez:    Thank you for referring Ann-Marie Saavedra to me for evaluation. Attached you will find relevant portions of my assessment and plan of care.    If you have questions, please do not hesitate to call me. I look forward to following Ann-Marie Saavedra along with you.    Sincerely,    Alessia Saxena MD    Enclosure  CC:  No Recipients    If you would like to receive this communication electronically, please contact [email or web address] or [phone] to request more information on [employer] access.    For providers and/or their staff who would like to refer a patient to [facility], please contact us through our one-stop-shop provider referral line, [facility], at [phone].    If you feel you have received this communication in error or would no longer like to receive these types of communications, please e-mail [email or web address]

## 2017-10-26 ENCOUNTER — IMMUNIZATION (OUTPATIENT)
Dept: OBSTETRICS AND GYNECOLOGY | Facility: CLINIC | Age: 37
End: 2017-10-26
Payer: COMMERCIAL

## 2017-10-26 ENCOUNTER — ROUTINE PRENATAL (OUTPATIENT)
Dept: OBSTETRICS AND GYNECOLOGY | Facility: CLINIC | Age: 37
End: 2017-10-26
Payer: COMMERCIAL

## 2017-10-26 ENCOUNTER — LAB VISIT (OUTPATIENT)
Dept: LAB | Facility: OTHER | Age: 37
End: 2017-10-26
Attending: OBSTETRICS & GYNECOLOGY
Payer: COMMERCIAL

## 2017-10-26 VITALS — SYSTOLIC BLOOD PRESSURE: 98 MMHG | WEIGHT: 157.19 LBS | BODY MASS INDEX: 25.37 KG/M2 | DIASTOLIC BLOOD PRESSURE: 60 MMHG

## 2017-10-26 DIAGNOSIS — Z34.80 SUPERVISION OF OTHER NORMAL PREGNANCY, ANTEPARTUM: Primary | ICD-10-CM

## 2017-10-26 DIAGNOSIS — Z34.80 SUPERVISION OF OTHER NORMAL PREGNANCY, ANTEPARTUM: ICD-10-CM

## 2017-10-26 LAB
ANISOCYTOSIS BLD QL SMEAR: SLIGHT
BASOPHILS # BLD AUTO: 0.04 K/UL
BASOPHILS NFR BLD: 0.4 %
DIFFERENTIAL METHOD: ABNORMAL
EOSINOPHIL # BLD AUTO: 0.1 K/UL
EOSINOPHIL NFR BLD: 1.3 %
ERYTHROCYTE [DISTWIDTH] IN BLOOD BY AUTOMATED COUNT: 12.4 %
GIANT PLATELETS BLD QL SMEAR: PRESENT
HCT VFR BLD AUTO: 34.9 %
HGB BLD-MCNC: 11.6 G/DL
HIV 1+2 AB+HIV1 P24 AG SERPL QL IA: NEGATIVE
LYMPHOCYTES # BLD AUTO: 1.3 K/UL
LYMPHOCYTES NFR BLD: 14.2 %
MCH RBC QN AUTO: 29.4 PG
MCHC RBC AUTO-ENTMCNC: 33.2 G/DL
MCV RBC AUTO: 89 FL
MONOCYTES # BLD AUTO: 0.6 K/UL
MONOCYTES NFR BLD: 6.6 %
NEUTROPHILS # BLD AUTO: 6.7 K/UL
NEUTROPHILS NFR BLD: 77.5 %
PLATELET # BLD AUTO: 259 K/UL
PLATELET BLD QL SMEAR: ABNORMAL
PMV BLD AUTO: 9.8 FL
POLYCHROMASIA BLD QL SMEAR: ABNORMAL
RBC # BLD AUTO: 3.94 M/UL
WBC # BLD AUTO: 9.21 K/UL

## 2017-10-26 PROCEDURE — 99999 PR PBB SHADOW E&M-EST. PATIENT-LVL II: CPT | Mod: PBBFAC,,, | Performed by: NURSE PRACTITIONER

## 2017-10-26 PROCEDURE — 87184 SC STD DISK METHOD PER PLATE: CPT

## 2017-10-26 PROCEDURE — 90686 IIV4 VACC NO PRSV 0.5 ML IM: CPT | Mod: S$GLB,,, | Performed by: OBSTETRICS & GYNECOLOGY

## 2017-10-26 PROCEDURE — 86703 HIV-1/HIV-2 1 RESULT ANTBDY: CPT

## 2017-10-26 PROCEDURE — 87081 CULTURE SCREEN ONLY: CPT

## 2017-10-26 PROCEDURE — 87147 CULTURE TYPE IMMUNOLOGIC: CPT

## 2017-10-26 PROCEDURE — 0502F SUBSEQUENT PRENATAL CARE: CPT | Mod: S$GLB,,, | Performed by: NURSE PRACTITIONER

## 2017-10-26 PROCEDURE — 36415 COLL VENOUS BLD VENIPUNCTURE: CPT

## 2017-10-26 PROCEDURE — 90471 IMMUNIZATION ADMIN: CPT | Mod: S$GLB,,, | Performed by: OBSTETRICS & GYNECOLOGY

## 2017-10-26 PROCEDURE — 86592 SYPHILIS TEST NON-TREP QUAL: CPT

## 2017-10-26 PROCEDURE — 85025 COMPLETE CBC W/AUTO DIFF WBC: CPT

## 2017-10-26 NOTE — PROGRESS NOTES
Here for routine OB appt at 35w2d, with no complaints.  Reports good FM.  Denies LOF, denies VB, denies contractions.  Reviewed warning signs of Labor and Preeclampsia.  Daily FM counts reinforced.  Plans to breastfeed- has pump.   GBS, T3 labs, SPADD and flu vaccine todat  F/U scheduled 1 week

## 2017-10-27 LAB — RPR SER QL: NORMAL

## 2017-10-28 LAB — BACTERIA SPEC AEROBE CULT: NORMAL

## 2017-11-09 ENCOUNTER — ROUTINE PRENATAL (OUTPATIENT)
Dept: OBSTETRICS AND GYNECOLOGY | Facility: CLINIC | Age: 37
End: 2017-11-09
Payer: COMMERCIAL

## 2017-11-09 VITALS
SYSTOLIC BLOOD PRESSURE: 108 MMHG | DIASTOLIC BLOOD PRESSURE: 64 MMHG | WEIGHT: 158.75 LBS | BODY MASS INDEX: 25.62 KG/M2

## 2017-11-09 DIAGNOSIS — Z34.80 SUPERVISION OF OTHER NORMAL PREGNANCY, ANTEPARTUM: Primary | ICD-10-CM

## 2017-11-09 PROCEDURE — 99999 PR PBB SHADOW E&M-EST. PATIENT-LVL II: CPT | Mod: PBBFAC,,, | Performed by: OBSTETRICS & GYNECOLOGY

## 2017-11-09 PROCEDURE — 0502F SUBSEQUENT PRENATAL CARE: CPT | Mod: S$GLB,,, | Performed by: OBSTETRICS & GYNECOLOGY

## 2017-11-09 NOTE — PROGRESS NOTES
Pt doing well. No vaginal bleeding, no loss of fluid, positive fetal movement, no contractions. Bleeding/labor/rom/fmc precautions.     Induction at 39 weeks per patient request.

## 2017-11-16 ENCOUNTER — ROUTINE PRENATAL (OUTPATIENT)
Dept: OBSTETRICS AND GYNECOLOGY | Facility: CLINIC | Age: 37
End: 2017-11-16
Payer: COMMERCIAL

## 2017-11-16 VITALS — SYSTOLIC BLOOD PRESSURE: 112 MMHG | DIASTOLIC BLOOD PRESSURE: 60 MMHG | BODY MASS INDEX: 25.58 KG/M2 | WEIGHT: 158.5 LBS

## 2017-11-16 DIAGNOSIS — Z34.80 SUPERVISION OF OTHER NORMAL PREGNANCY, ANTEPARTUM: Primary | ICD-10-CM

## 2017-11-16 PROCEDURE — 0502F SUBSEQUENT PRENATAL CARE: CPT | Mod: S$GLB,,, | Performed by: OBSTETRICS & GYNECOLOGY

## 2017-11-16 PROCEDURE — 99999 PR PBB SHADOW E&M-EST. PATIENT-LVL II: CPT | Mod: PBBFAC,,, | Performed by: OBSTETRICS & GYNECOLOGY

## 2017-11-16 NOTE — PROGRESS NOTES
Pt doing well. No vaginal bleeding, no loss of fluid, positive fetal movement, no contractions. Bleeding/labor/rom/fmc precautions.     Induction Tuesday

## 2017-11-20 PROBLEM — A49.1 GBS (GROUP B STREPTOCOCCUS) INFECTION: Status: ACTIVE | Noted: 2017-11-20

## 2017-11-20 NOTE — HOSPITAL COURSE
2017 Admit to L&D for IOL with pitocin/AROM as indicated.   2017 - PPD #1 s/p . Doing well and meeting PP milestones.   2017- PPD#2 s/p . Meeting PP milestones

## 2017-11-20 NOTE — SUBJECTIVE & OBJECTIVE
Obstetric HPI:    Obstetric History       T1      L1     SAB0   TAB0   Ectopic0   Multiple0   Live Births1       # Outcome Date GA Lbr Gee/2nd Weight Sex Delivery Anes PTL Lv   2 Current            1 Term 09/15/15 40w0d  3.8 kg (8 lb 6 oz) F Vag-Spont EPI N MARILY      Apgar1:  3                Apgar5: 8        Past Medical History:   Diagnosis Date    Abnormal Pap smear     no colpo    Keloid cicatrix     under right breast     Past Surgical History:   Procedure Laterality Date    CYST REMOVAL Right     chest wall    FOOT TENDON SURGERY Left     bunion       No prescriptions prior to admission.       Review of patient's allergies indicates:  No Known Allergies     Family History     Problem Relation (Age of Onset)    Colon cancer Maternal Grandfather, Paternal Grandfather    Eczema Sister        Social History Main Topics    Smoking status: Never Smoker    Smokeless tobacco: Never Used    Alcohol use No    Drug use: No    Sexual activity: Not Currently     Partners: Male     Birth control/ protection: None     Review of Systems   Constitutional: Negative for activity change, chills and fatigue.   Eyes: Negative for visual disturbance.   Respiratory: Negative for shortness of breath.    Cardiovascular: Negative for chest pain and palpitations.   Gastrointestinal: Negative for abdominal pain, constipation, diarrhea, nausea and vomiting.   Genitourinary: Negative for dysuria, vaginal bleeding, vaginal discharge, vaginal pain and vaginal odor.   Musculoskeletal: Negative for myalgias.   Skin:  Negative for rash.   Neurological: Negative for headaches.   Psychiatric/Behavioral: Negative for depression.      Objective:     Vital Signs (Most Recent):    Vital Signs (24h Range):           There is no height or weight on file to calculate BMI.    FHT: Cat 1 (reassuring)  TOCO: no ctx, minor irritability    Physical Exam:   Constitutional: She is oriented to person, place, and time. She  appears well-developed and well-nourished.    HENT:   Head: Normocephalic and atraumatic.    Eyes: EOM are normal. Pupils are equal, round, and reactive to light.    Neck: Normal range of motion. Neck supple.    Cardiovascular: Normal rate, regular rhythm and normal heart sounds.     Pulmonary/Chest: Effort normal and breath sounds normal. No respiratory distress.        Abdominal: Soft. Bowel sounds are normal. She exhibits no distension. There is no tenderness. There is no rebound and no guarding.   Gravid, size appropriate for dates              Musculoskeletal: Normal range of motion.       Neurological: She is alert and oriented to person, place, and time.    Skin: Skin is warm and dry. No rash noted.    Psychiatric: She has a normal mood and affect. Her behavior is normal. Judgment and thought content normal.     Cervix:  Dilation:  3  Effacement:  75%  Station: -3  Presentation: Vertex     Significant Labs:  Lab Results   Component Value Date    GROUPTRH O POS 04/18/2017    HEPBSAG Negative 04/18/2017    STREPBCULT  10/26/2017     STREPTOCOCCUS AGALACTIAE (GROUP B)  Beta-hemolytic streptococci are routinely susceptible to   penicillins,cephalosporins and carbapenems.       I have personallly reviewed all pertinent lab results from the last 24 hours.

## 2017-11-20 NOTE — ASSESSMENT & PLAN NOTE
- Admit to Labor and Delivery unit  - Consents for delivery including vaginal or  section and blood transfusion signed and to chart  - Risks, benefits, alternatives and possible complications have been discussed in detail with the patient.   - Epidural per Anesthesia  - Draw CBC, T&S  - Notify Staff  - Recheck in 2 hrs or PRN, AROM as indicated    Post-Partum Hemorrhage risk - low

## 2017-11-20 NOTE — HPI
Ann-Marie Saavedra is a 37 y.o. Q3T2200G at 38w6d presents for scheduled IOL at term.     This IUP is complicated by GBS+ status, AMA.  Patient denies contractions, denies vaginal bleeding, denies LOF.   Fetal Movement: normal.

## 2017-11-21 ENCOUNTER — ANESTHESIA EVENT (OUTPATIENT)
Dept: OBSTETRICS AND GYNECOLOGY | Facility: OTHER | Age: 37
End: 2017-11-21
Payer: COMMERCIAL

## 2017-11-21 ENCOUNTER — HOSPITAL ENCOUNTER (INPATIENT)
Facility: OTHER | Age: 37
LOS: 2 days | Discharge: HOME | End: 2017-11-23
Attending: OBSTETRICS & GYNECOLOGY | Admitting: OBSTETRICS & GYNECOLOGY
Payer: COMMERCIAL

## 2017-11-21 ENCOUNTER — ANESTHESIA (OUTPATIENT)
Dept: OBSTETRICS AND GYNECOLOGY | Facility: OTHER | Age: 37
End: 2017-11-21
Payer: COMMERCIAL

## 2017-11-21 DIAGNOSIS — A49.1 GBS (GROUP B STREPTOCOCCUS) INFECTION: ICD-10-CM

## 2017-11-21 DIAGNOSIS — Z34.03 ENCOUNTER FOR SUPERVISION OF NORMAL FIRST PREGNANCY IN THIRD TRIMESTER: Primary | ICD-10-CM

## 2017-11-21 LAB
ABO + RH BLD: NORMAL
BASOPHILS # BLD AUTO: 0.03 K/UL
BASOPHILS NFR BLD: 0.4 %
BLD GP AB SCN CELLS X3 SERPL QL: NORMAL
DIFFERENTIAL METHOD: ABNORMAL
EOSINOPHIL # BLD AUTO: 0.1 K/UL
EOSINOPHIL NFR BLD: 1.3 %
ERYTHROCYTE [DISTWIDTH] IN BLOOD BY AUTOMATED COUNT: 12.7 %
HCT VFR BLD AUTO: 35.8 %
HGB BLD-MCNC: 12.1 G/DL
LYMPHOCYTES # BLD AUTO: 2 K/UL
LYMPHOCYTES NFR BLD: 26.6 %
MCH RBC QN AUTO: 29.5 PG
MCHC RBC AUTO-ENTMCNC: 33.8 G/DL
MCV RBC AUTO: 87 FL
MONOCYTES # BLD AUTO: 0.6 K/UL
MONOCYTES NFR BLD: 7.2 %
NEUTROPHILS # BLD AUTO: 4.7 K/UL
NEUTROPHILS NFR BLD: 64.5 %
PLATELET # BLD AUTO: 205 K/UL
PLATELET BLD QL SMEAR: ABNORMAL
PMV BLD AUTO: 9.7 FL
RBC # BLD AUTO: 4.1 M/UL
WBC # BLD AUTO: 7.64 K/UL

## 2017-11-21 PROCEDURE — 86900 BLOOD TYPING SEROLOGIC ABO: CPT

## 2017-11-21 PROCEDURE — 36415 COLL VENOUS BLD VENIPUNCTURE: CPT

## 2017-11-21 PROCEDURE — 59400 OBSTETRICAL CARE: CPT | Mod: ,,, | Performed by: OBSTETRICS & GYNECOLOGY

## 2017-11-21 PROCEDURE — 86850 RBC ANTIBODY SCREEN: CPT

## 2017-11-21 PROCEDURE — 27000181 HC CABLE, IUPC

## 2017-11-21 PROCEDURE — 0KQM0ZZ REPAIR PERINEUM MUSCLE, OPEN APPROACH: ICD-10-PCS | Performed by: OBSTETRICS & GYNECOLOGY

## 2017-11-21 PROCEDURE — 63600175 PHARM REV CODE 636 W HCPCS: Performed by: STUDENT IN AN ORGANIZED HEALTH CARE EDUCATION/TRAINING PROGRAM

## 2017-11-21 PROCEDURE — 85025 COMPLETE CBC W/AUTO DIFF WBC: CPT

## 2017-11-21 PROCEDURE — 72100003 HC LABOR CARE, EA. ADDL. 8 HRS

## 2017-11-21 PROCEDURE — 10907ZC DRAINAGE OF AMNIOTIC FLUID, THERAPEUTIC FROM PRODUCTS OF CONCEPTION, VIA NATURAL OR ARTIFICIAL OPENING: ICD-10-PCS | Performed by: OBSTETRICS & GYNECOLOGY

## 2017-11-21 PROCEDURE — 27200710 HC EPIDURAL INFUSION PUMP SET: Performed by: ANESTHESIOLOGY

## 2017-11-21 PROCEDURE — 72200005 HC VAGINAL DELIVERY LEVEL II

## 2017-11-21 PROCEDURE — 25000003 PHARM REV CODE 250: Performed by: STUDENT IN AN ORGANIZED HEALTH CARE EDUCATION/TRAINING PROGRAM

## 2017-11-21 PROCEDURE — 27800517 HC TRAY,EPIDURAL-CONTINUOUS: Performed by: ANESTHESIOLOGY

## 2017-11-21 PROCEDURE — 62326 NJX INTERLAMINAR LMBR/SAC: CPT | Performed by: ANESTHESIOLOGY

## 2017-11-21 PROCEDURE — 63600175 PHARM REV CODE 636 W HCPCS: Performed by: ANESTHESIOLOGY

## 2017-11-21 PROCEDURE — 72100002 HC LABOR CARE, 1ST 8 HOURS

## 2017-11-21 PROCEDURE — 11000001 HC ACUTE MED/SURG PRIVATE ROOM

## 2017-11-21 PROCEDURE — 51702 INSERT TEMP BLADDER CATH: CPT

## 2017-11-21 PROCEDURE — 59400 OBSTETRICAL CARE: CPT | Mod: QY,,, | Performed by: ANESTHESIOLOGY

## 2017-11-21 PROCEDURE — 25000003 PHARM REV CODE 250: Performed by: ANESTHESIOLOGY

## 2017-11-21 PROCEDURE — 3E0P3VZ INTRODUCTION OF HORMONE INTO FEMALE REPRODUCTIVE, PERCUTANEOUS APPROACH: ICD-10-PCS | Performed by: OBSTETRICS & GYNECOLOGY

## 2017-11-21 RX ORDER — SODIUM CITRATE AND CITRIC ACID MONOHYDRATE 334; 500 MG/5ML; MG/5ML
30 SOLUTION ORAL ONCE
Status: DISCONTINUED | OUTPATIENT
Start: 2017-11-21 | End: 2017-11-23 | Stop reason: HOSPADM

## 2017-11-21 RX ORDER — DOCUSATE SODIUM 100 MG/1
200 CAPSULE, LIQUID FILLED ORAL 2 TIMES DAILY PRN
Status: DISCONTINUED | OUTPATIENT
Start: 2017-11-21 | End: 2017-11-23 | Stop reason: HOSPADM

## 2017-11-21 RX ORDER — FENTANYL CITRATE 50 UG/ML
INJECTION, SOLUTION INTRAMUSCULAR; INTRAVENOUS
Status: DISCONTINUED | OUTPATIENT
Start: 2017-11-21 | End: 2017-11-21

## 2017-11-21 RX ORDER — NALBUPHINE HYDROCHLORIDE 10 MG/ML
2.5 INJECTION, SOLUTION INTRAMUSCULAR; INTRAVENOUS; SUBCUTANEOUS
Status: DISCONTINUED | OUTPATIENT
Start: 2017-11-21 | End: 2017-11-23 | Stop reason: HOSPADM

## 2017-11-21 RX ORDER — OXYTOCIN/RINGER'S LACTATE 20/1000 ML
2 PLASTIC BAG, INJECTION (ML) INTRAVENOUS CONTINUOUS
Status: DISCONTINUED | OUTPATIENT
Start: 2017-11-21 | End: 2017-11-21

## 2017-11-21 RX ORDER — SODIUM CHLORIDE, SODIUM LACTATE, POTASSIUM CHLORIDE, CALCIUM CHLORIDE 600; 310; 30; 20 MG/100ML; MG/100ML; MG/100ML; MG/100ML
INJECTION, SOLUTION INTRAVENOUS CONTINUOUS
Status: DISCONTINUED | OUTPATIENT
Start: 2017-11-21 | End: 2017-11-23 | Stop reason: HOSPADM

## 2017-11-21 RX ORDER — ONDANSETRON 8 MG/1
8 TABLET, ORALLY DISINTEGRATING ORAL EVERY 8 HOURS PRN
Status: DISCONTINUED | OUTPATIENT
Start: 2017-11-21 | End: 2017-11-23 | Stop reason: HOSPADM

## 2017-11-21 RX ORDER — BUPIVACAINE HYDROCHLORIDE 2.5 MG/ML
INJECTION, SOLUTION EPIDURAL; INFILTRATION; INTRACAUDAL
Status: DISPENSED
Start: 2017-11-21 | End: 2017-11-21

## 2017-11-21 RX ORDER — IBUPROFEN 600 MG/1
600 TABLET ORAL EVERY 6 HOURS
Status: DISCONTINUED | OUTPATIENT
Start: 2017-11-22 | End: 2017-11-23 | Stop reason: HOSPADM

## 2017-11-21 RX ORDER — FENTANYL CITRATE 50 UG/ML
INJECTION, SOLUTION INTRAMUSCULAR; INTRAVENOUS
Status: DISPENSED
Start: 2017-11-21 | End: 2017-11-21

## 2017-11-21 RX ORDER — LIDOCAINE HYDROCHLORIDE AND EPINEPHRINE 15; 5 MG/ML; UG/ML
INJECTION, SOLUTION EPIDURAL
Status: DISCONTINUED | OUTPATIENT
Start: 2017-11-21 | End: 2017-11-21

## 2017-11-21 RX ORDER — FAMOTIDINE 10 MG/ML
20 INJECTION INTRAVENOUS ONCE
Status: DISCONTINUED | OUTPATIENT
Start: 2017-11-21 | End: 2017-11-23 | Stop reason: HOSPADM

## 2017-11-21 RX ORDER — FENTANYL/BUPIVACAINE/NS/PF 2MCG/ML-.1
PLASTIC BAG, INJECTION (ML) INJECTION CONTINUOUS
Status: DISCONTINUED | OUTPATIENT
Start: 2017-11-21 | End: 2017-11-23 | Stop reason: HOSPADM

## 2017-11-21 RX ORDER — DIPHENHYDRAMINE HCL 25 MG
25 CAPSULE ORAL EVERY 4 HOURS PRN
Status: DISCONTINUED | OUTPATIENT
Start: 2017-11-21 | End: 2017-11-23 | Stop reason: HOSPADM

## 2017-11-21 RX ORDER — HYDROCODONE BITARTRATE AND ACETAMINOPHEN 10; 325 MG/1; MG/1
1 TABLET ORAL EVERY 4 HOURS PRN
Status: DISCONTINUED | OUTPATIENT
Start: 2017-11-21 | End: 2017-11-23 | Stop reason: HOSPADM

## 2017-11-21 RX ORDER — HYDROCODONE BITARTRATE AND ACETAMINOPHEN 5; 325 MG/1; MG/1
1 TABLET ORAL EVERY 4 HOURS PRN
Status: DISCONTINUED | OUTPATIENT
Start: 2017-11-21 | End: 2017-11-23 | Stop reason: HOSPADM

## 2017-11-21 RX ORDER — FENTANYL/BUPIVACAINE/NS/PF 2MCG/ML-.1
PLASTIC BAG, INJECTION (ML) INJECTION CONTINUOUS PRN
Status: DISCONTINUED | OUTPATIENT
Start: 2017-11-21 | End: 2017-11-21

## 2017-11-21 RX ORDER — ACETAMINOPHEN 325 MG/1
650 TABLET ORAL EVERY 6 HOURS PRN
Status: DISCONTINUED | OUTPATIENT
Start: 2017-11-21 | End: 2017-11-23 | Stop reason: HOSPADM

## 2017-11-21 RX ORDER — FENTANYL/BUPIVACAINE/NS/PF 2MCG/ML-.1
PLASTIC BAG, INJECTION (ML) INJECTION
Status: DISPENSED
Start: 2017-11-21 | End: 2017-11-21

## 2017-11-21 RX ORDER — MISOPROSTOL 200 UG/1
600 TABLET ORAL
Status: DISCONTINUED | OUTPATIENT
Start: 2017-11-21 | End: 2017-11-23 | Stop reason: HOSPADM

## 2017-11-21 RX ORDER — OXYTOCIN/RINGER'S LACTATE 20/1000 ML
41.65 PLASTIC BAG, INJECTION (ML) INTRAVENOUS CONTINUOUS
Status: ACTIVE | OUTPATIENT
Start: 2017-11-21 | End: 2017-11-22

## 2017-11-21 RX ORDER — DIPHENHYDRAMINE HYDROCHLORIDE 50 MG/ML
25 INJECTION INTRAMUSCULAR; INTRAVENOUS EVERY 4 HOURS PRN
Status: DISCONTINUED | OUTPATIENT
Start: 2017-11-21 | End: 2017-11-23 | Stop reason: HOSPADM

## 2017-11-21 RX ORDER — ONDANSETRON 8 MG/1
8 TABLET, ORALLY DISINTEGRATING ORAL EVERY 8 HOURS PRN
Status: DISCONTINUED | OUTPATIENT
Start: 2017-11-21 | End: 2017-11-21 | Stop reason: SDUPTHER

## 2017-11-21 RX ADMIN — SODIUM CHLORIDE, SODIUM LACTATE, POTASSIUM CHLORIDE, AND CALCIUM CHLORIDE: .6; .31; .03; .02 INJECTION, SOLUTION INTRAVENOUS at 04:11

## 2017-11-21 RX ADMIN — Medication 2.5 MILLION UNITS: at 08:11

## 2017-11-21 RX ADMIN — LIDOCAINE HYDROCHLORIDE,EPINEPHRINE BITARTRATE 3 ML: 15; .005 INJECTION, SOLUTION EPIDURAL; INFILTRATION; INTRACAUDAL; PERINEURAL at 06:11

## 2017-11-21 RX ADMIN — SODIUM CHLORIDE, SODIUM LACTATE, POTASSIUM CHLORIDE, AND CALCIUM CHLORIDE 1000 ML: .6; .31; .03; .02 INJECTION, SOLUTION INTRAVENOUS at 05:11

## 2017-11-21 RX ADMIN — FENTANYL CITRATE 100 MCG: 50 INJECTION, SOLUTION INTRAMUSCULAR; INTRAVENOUS at 06:11

## 2017-11-21 RX ADMIN — IBUPROFEN 600 MG: 600 TABLET, FILM COATED ORAL at 11:11

## 2017-11-21 RX ADMIN — Medication 10 ML/HR: at 06:11

## 2017-11-21 RX ADMIN — NALBUPHINE HYDROCHLORIDE 2.5 MG: 10 INJECTION, SOLUTION INTRAMUSCULAR; INTRAVENOUS; SUBCUTANEOUS at 08:11

## 2017-11-21 RX ADMIN — Medication 5 MILLION UNITS: at 04:11

## 2017-11-21 RX ADMIN — HYDROCODONE BITARTRATE AND ACETAMINOPHEN 1 TABLET: 5; 325 TABLET ORAL at 08:11

## 2017-11-21 RX ADMIN — Medication 2 MILLI-UNITS/MIN: at 05:11

## 2017-11-21 RX ADMIN — Medication 5 ML: at 06:11

## 2017-11-21 NOTE — ANESTHESIA PROCEDURE NOTES
Epidural    Patient location during procedure: OB   Reason for block: primary anesthetic   Diagnosis: Active Labor   Start time: 11/21/2017 6:23 AM  Timeout: 11/21/2017 6:22 AM  End time: 11/21/2017 6:28 AM  Surgery related to: Vaginal Delivery  Staffing  Anesthesiologist: GONZÁLEZ WATT  Resident/CRNA: TUCKER MARTÍNEZ  Performed: resident/CRNA   Preanesthetic Checklist  Completed: patient identified, site marked, surgical consent, pre-op evaluation, timeout performed, IV checked, risks and benefits discussed, monitors and equipment checked, anesthesia consent given, hand hygiene performed and patient being monitored  Preparation  Patient position: sitting  Prep: ChloraPrep  Patient monitoring: Pulse Ox and Blood Pressure  Epidural  Skin Anesthetic: lidocaine 1%  Skin Wheal: 3 mL  Administration type: continuous  Approach: midline  Interspace: L4-5  Injection technique: PARISH saline  Needle and Epidural Catheter  Needle type: Tuohy   Needle gauge: 17  Needle length: 3.5 inches  Catheter type: springwound and multi-orifice  Catheter size: 19 G  Test dose: 3 mL of lidocaine 1.5% with Epi 1-to-200,000  Additional Documentation: incremental injection, negative aspiration for heme and CSF, no paresthesia on injection, no signs/symptoms of IV or SA injection, no significant pain on injection and no significant complaints from patient  Needle localization: anatomical landmarks  Medications:  Bolus administered: 10 mL of 0.125% bupivacaine  Opioid administered: 100 mcg of   fentanyl  Volume per aspiration: 5 mL  Time between aspirations: 5 minutes  Assessment   Dermatomal levels determined by ice  Ease of block: easy  Patient's tolerance of the procedure: comfortable throughout block and no complaints

## 2017-11-21 NOTE — PROGRESS NOTES
LABOR PROGRESS NOTE    S:  MD to bedside for cervical check. Complaints: No.      O: Temp:  [96.4 °F (35.8 °C)] 96.4 °F (35.8 °C)  Pulse:  [46-74] 55  Resp:  [18] 18  SpO2:  [95 %-100 %] 100 %  BP: ()/(50-64) 101/56      FHT: 120 BPM/mod beat to beat variability/pos accels/recurrent variable decels with contractions, Cat 2 (reassuring)  CTX: q1-3 min  SVE: /0    ASSESSMENT:   37 y.o.  at 39w0d, IOL at term    FHT reassuring    Active Hospital Problems    Diagnosis  POA    *Normal labor and delivery [O80]  Not Applicable    GBS (group B streptococcus) infection [A49.1]  Yes      Resolved Hospital Problems    Diagnosis Date Resolved POA   No resolved problems to display.         PLAN:    Labor management  Continue Close Maternal/Fetal Monitoring.   Pitocin Augmentation per protocol  AROM clear IUPC placed  Patient desires epidural at this time  Recheck 2 hours or PRN    GBS pos  - PCN 2.5 millunits q6    Ferdinand Benson MD

## 2017-11-21 NOTE — L&D DELIVERY NOTE
cephalic after approximately 10 minutes of maternal pushing.  Under epidural anesthesia.  Infant delivered OA over second degree tear of perineum.  Nuchal x1 reduced at introitus.  Infant also tolerated the delivery well and was placed on mothers abdomen for skin to skin and bulb suctioning performed.  Cord clamped and cut.  Umbilical arterial gas and venous blood obtained.  Placenta delivered spontaneously and IV pitocin given.  Posterior second degree laceration repaired with vicryl and interrupted chromic gut sutures.   Uterine tone noted. No cervical lacerations.  Patient tolerated delivery well.   cc  Staff present for entire procedure.  S/L/N counts correct x2.       Delivery Information for  Lubna Saavedra    Birth information:  YOB: 2015   Time of birth: 12:24 PM   Sex: female   Head Delivery Date/Time: 2017 12:24 PM   Delivery type: Vaginal, Spontaneous Delivery   Gestational Age: 39w0d    Delivery Providers    Delivering clinician:  Tali Hernandez MD   Other personnel:   Provider Role   ALFRED Brown RN Logan Schaller Corey, MD Lauren Griffiths, RN                     Assessment    Living status:  Living  Apgars:     1 Minute:   5 Minute:   10 Minute:   15 Minute:   20 Minute:     Skin Color:   1  1       Heart Rate:   2  2       Reflex Irritability:   2  2       Muscle Tone:   2  2       Respiratory Effort:   2  2       Total:   9  9               Apgars Assigned By:  SHYLA         Assisted Delivery Details:    Forceps attempted?:  No  Vacuum extractor attempted?:  No         Shoulder Dystocia    Shoulder dystocia present?:  No           Presentation and Position    Presentation:   Vertex   Position:       Occiput    Anterior            Interventions/Resuscitation    Method:  None       Cord    Vessels:  3 vessels  Complications:  Nuchal  Nuchal Intervention:  reduced  Nuchal Cord Description:  loose nuchal  cord  Number of Loops:  1  Delayed Cord Clamping?:  Yes  Cord Clamped Date/Time:  2017 12:26 PM  Cord Blood Disposition:  Sent with Baby       Placenta    Date and time:  2017 12:28 PM  Removal:  Spontaneous  Appearance:  Intact  Placenta disposition:  discarded           Labor Events:       labor: No     Labor Onset Date/Time:         Dilation Complete Date/Time:         Start Pushing Date/Time:       Rupture Date/Time:              Rupture type:           Fluid Amount:        Fluid Color:        Fluid Odor:        Membrane Status (PeriCalm): ARM (Artificial Rupture)      Rupture Date/Time (PeriCalm): 2017 08:18:00      Fluid Amount (PeriCalm): Moderate      Fluid Color (PeriCalm): Clear       steroids: None     Antibiotics given for GBS: Yes     Induction: amniotomy;oxytocin     Indications for induction:  Elective     Augmentation:       Indications for augmentation:       Labor complications: None     Additional complications:          Cervical ripening:                     Delivery:      Episiotomy: None     Indication for Episiotomy:       Perineal Lacerations: 2nd Repaired:  Yes   Periurethral Laceration: none Repaired:     Labial Laceration: none Repaired:     Sulcus Laceration: none Repaired:     Vaginal Laceration: No Repaired:     Cervical Laceration: No Repaired:     Repair suture:       Repair # of packets:       Vaginal delivery QBL (mL):        QBL (mL): 0     Combined Blood Loss (mL): 0     Vaginal Sweep Performed:       Surgicount Correct:         Other providers:       Anesthesia    Method:  Epidural          Details (if applicable):  Trial of Labor      Categorization:      Priority:     Indications for :     Incision Type:       Additional  information:  Forceps:    Vacuum:    Breech:    Observed anomalies    Other (Comments):

## 2017-11-21 NOTE — DISCHARGE INSTRUCTIONS
Breastfeeding Discharge Instructions       Feed the baby at the earliest sign of hunger or comfort  o Hands to mouth, sucking motions  o Rooting or searching for something to suck on  o Dont wait for crying - it is a sign of distress     The feedings may be 8-12 times per 24hrs and will not follow a schedule   Avoid pacifiers and bottles for the first 4 weeks   Alternate the breast you start the feeding with, or start with the breast that feels the fullest   Switch breasts when the baby takes himself off the breast or falls asleep   Keep offering breasts until the baby looks full, no longer gives hunger signs, and stays asleep when placed on his back in the crib   If the baby is sleepy and wont wake for a feeding, put the baby skin-to-skin dressed in a diaper against the mothers bare chest   Sleep near your baby   The baby should be positioned and latched on to the breast correctly  o Chest-to-chest, chin in the breast  o Babys lips are flipped outward  o Babys mouth is stretched open wide like a shout  o Babys sucking should feel like tugging to the mother  - The baby should be drinking at the breast:  o You should hear swallowing or gulping throughout the feeding  o You should see milk on the babys lips when he comes off the breast  o Your breasts should be softer when the baby is finished feeding  o The baby should look relaxed at the end of feedings  o After the 4th day and your milk is in:  o The babys poop should turn bright yellow and be loose, watery, and seedy  o The baby should have at least 3-4 poops the size of the palm of your hand per day  o The baby should have at least 5-6 wet diapers per day  o The urine should be light yellow in color  You should drink when you are thirsty and eat a healthy diet when you are    hungry.     Take naps to get the rest you need.   Take medications and/or drink alcohol only with permission of your obstetrician    or the babys pediatrician.  You can  also call the [facility] Risk [facility],   ([phone]), Monday-Friday, 8am-5pm Central time, to get the most   up-to-date evidence-based information on the use of medications during   pregnancy and breastfeeding.      The baby should be examined by a pediatrician at 3-5 days of age.  Once your   milk comes in, the baby should be gaining at least ½ - 1oz each day and should be back to birthweight no later than 10-14 days of age.          Community Resources    [facility] Breastfeeding Warmline: [phone]   Local [facility] clinics: provide incentives and breastpumps to eligible mothers  [employer] (LLLI):  mother-to-mother support group website        www.[employer]  Local La Leche League mother-to-mother support groups:        www.[employer]        La Leche League [facility]   Dr. Edwin Renee website for latch videos and general information:        www.breastfeedinginc.ca  [facility] is a call center that provides information about the safety of taking medications while breastfeeding.  Call [phone], M-F, 8am-5pm, [place].  International Lactation Consultant Association provides resources for assistance:        www.ilca.org  [facility] Breastfeeding Coalition provides informationand resources for parents  and the community    http://[facility]ast[facility].org     Raya Torres is a mom-to-mom support group:                             www.[employer].com//breastfeedng-support/  [employer] for Healthy Babies:  6-280-253-BABY(5036)  [employer]: a breastfeeding support group for women of color, [phone]

## 2017-11-21 NOTE — H&P
[facility]  Obstetrics  History & Physical    Patient Name: Ann-Marie Saavedra  MRN: [id number]  Admission Date: 2017  Primary Care Provider: Tali Hernandez MD    Subjective:     Principal Problem:Normal labor and delivery    History of Present Illness:    Ann-Marie Saavedra is a 37 y.o. [id number] at 38w6d presents for scheduled IOL at term.     This IUP is complicated by GBS+ status, AMA.  Patient denies contractions, denies vaginal bleeding, denies LOF.   Fetal Movement: normal.      Obstetric HPI:    Obstetric History       T1      L1     SAB0   TAB0   Ectopic0   Multiple0   Live Births1       # Outcome Date GA Lbr Gee/2nd Weight Sex Delivery Anes PTL Lv   2 Current            1 Term 09/15/15 40w0d  3.8 kg (8 lb 6 oz) F Vag-Spont EPI N MARILY      Apgar1:  3                Apgar5: 8        Past Medical History:   Diagnosis Date    Abnormal Pap smear     no colpo    Keloid cicatrix     under right breast     Past Surgical History:   Procedure Laterality Date    CYST REMOVAL Right     chest wall    FOOT TENDON SURGERY Left     bunion       No prescriptions prior to admission.       Review of patient's allergies indicates:  No Known Allergies     Family History     Problem Relation (Age of Onset)    Colon cancer Maternal Grandfather, Paternal Grandfather    Eczema Sister        Social History Main Topics    Smoking status: Never Smoker    Smokeless tobacco: Never Used    Alcohol use No    Drug use: No    Sexual activity: Not Currently     Partners: Male     Birth control/ protection: None     Review of Systems   Constitutional: Negative for activity change, chills and fatigue.   Eyes: Negative for visual disturbance.   Respiratory: Negative for shortness of breath.    Cardiovascular: Negative for chest pain and palpitations.   Gastrointestinal: Negative for abdominal pain, constipation, diarrhea, nausea and vomiting.   Genitourinary: Negative for dysuria,  vaginal bleeding, vaginal discharge, vaginal pain and vaginal odor.   Musculoskeletal: Negative for myalgias.   Skin:  Negative for rash.   Neurological: Negative for headaches.   Psychiatric/Behavioral: Negative for depression.      Objective:     Vital Signs (Most Recent):    Vital Signs (24h Range):           There is no height or weight on file to calculate BMI.    FHT: Cat 1 (reassuring)  TOCO: no ctx, minor irritability    Physical Exam:   Constitutional: She is oriented to person, place, and time. She appears well-developed and well-nourished.    HENT:   Head: Normocephalic and atraumatic.    Eyes: EOM are normal. Pupils are equal, round, and reactive to light.    Neck: Normal range of motion. Neck supple.    Cardiovascular: Normal rate, regular rhythm and normal heart sounds.     Pulmonary/Chest: Effort normal and breath sounds normal. No respiratory distress.        Abdominal: Soft. Bowel sounds are normal. She exhibits no distension. There is no tenderness. There is no rebound and no guarding.   Gravid, size appropriate for dates              Musculoskeletal: Normal range of motion.       Neurological: She is alert and oriented to person, place, and time.    Skin: Skin is warm and dry. No rash noted.    Psychiatric: She has a normal mood and affect. Her behavior is normal. Judgment and thought content normal.     Cervix:  Dilation:  3  Effacement:  75%  Station: -3  Presentation: Vertex     Significant Labs:  Lab Results   Component Value Date    GROUPTRH O POS 2017    HEPBSAG Negative 2017    STREPBCULT  10/26/2017     STREPTOCOCCUS AGALACTIAE (GROUP B)  Beta-hemolytic streptococci are routinely susceptible to   penicillins,cephalosporins and carbapenems.       I have personallly reviewed all pertinent lab results from the last 24 hours.    Assessment/Plan:     37 y.o. female  at 39w0d for:    * Normal labor and delivery    - Admit to Labor and Delivery unit  - Consents for delivery  including vaginal or  section and blood transfusion signed and to chart  - Risks, benefits, alternatives and possible complications have been discussed in detail with the patient.   - Epidural per Anesthesia  - Draw CBC, T&S  - Notify Staff  - Recheck in 2 hrs or PRN, AROM as indicated    Post-Partum Hemorrhage risk - low         GBS (group B streptococcus) infection    - PCN for labor and delivery            Christiano Rico MD  Obstetrics  [facility]

## 2017-11-21 NOTE — PROGRESS NOTES
LABOR PROGRESS NOTE    S:  MD to bedside for cervical check. Complaints: No.      O: Temp:  [96.4 °F (35.8 °C)] 96.4 °F (35.8 °C)  Pulse:  [49-74] 52  Resp:  [18] 18  SpO2:  [96 %-99 %] 98 %  BP: ()/(54-64) 102/59      FHT: 130 BPM/mod beat to beat variability/no accels/no decels Cat 1 (reassuring)  CTX: acontractile  SVE: /-2        ASSESSMENT:   37 y.o.  at 39w0d, IOL at term    FHT reassuring    Active Hospital Problems    Diagnosis  POA    *Normal labor and delivery [O80]  Not Applicable    GBS (group B streptococcus) infection [A49.1]  Yes      Resolved Hospital Problems    Diagnosis Date Resolved POA   No resolved problems to display.         PLAN:    Labor management  Continue Close Maternal/Fetal Monitoring.   Pitocin Augmentation per protocol  AROM clear IUPC placed  Patient desires epidural at this time  Recheck 2 hours or PRN    GBS pos  - PCN 2.5 millunits q6    Ferdinand Benson MD

## 2017-11-21 NOTE — PROGRESS NOTES
LABOR PROGRESS NOTE    S:  MD to bedside for cervical check. Complaints: No.      O: Temp:  [96.4 °F (35.8 °C)] 96.4 °F (35.8 °C)  Pulse:  [56-74] 57  Resp:  [18] 18  SpO2:  [96 %-97 %] 96 %  BP: ()/(54-64) 103/61      FHT: 130 BPM/mod beat to beat variability/no accels/no decels Cat 1 (reassuring)  CTX: acontractile  SVE: /-3        ASSESSMENT:   37 y.o.  at 39w0d, IOL at term    FHT reassuring    Active Hospital Problems    Diagnosis  POA    *Normal labor and delivery [O80]  Not Applicable    GBS (group B streptococcus) infection [A49.1]  Yes      Resolved Hospital Problems    Diagnosis Date Resolved POA   No resolved problems to display.         PLAN:    Labor management  Continue Close Maternal/Fetal Monitoring.   Pitocin Augmentation per protocol, at 4 now  Patient desires epidural at this time  Recheck 2 hours or PRN    GBS pos  - PCN 2.5 millunits q6    Ferdinand Benson MD

## 2017-11-21 NOTE — PROGRESS NOTES
Pt reports right leg still slightly numb, pt assisted up to wheelchair, pt transferred from ldr 10 to mbu rm 632 via wheelchair. Pt oriented to room, call bell within reach. Baby and  at bedside.

## 2017-11-21 NOTE — LACTATION NOTE
Pt has lactation contact number to call for breastfeeding assessment. Basic lactation education given.

## 2017-11-22 LAB
BASOPHILS # BLD AUTO: 0.02 K/UL
BASOPHILS NFR BLD: 0.2 %
DIFFERENTIAL METHOD: ABNORMAL
EOSINOPHIL # BLD AUTO: 0.1 K/UL
EOSINOPHIL NFR BLD: 1.3 %
ERYTHROCYTE [DISTWIDTH] IN BLOOD BY AUTOMATED COUNT: 12.9 %
HCT VFR BLD AUTO: 30.6 %
HGB BLD-MCNC: 10.3 G/DL
LYMPHOCYTES # BLD AUTO: 2.6 K/UL
LYMPHOCYTES NFR BLD: 23.1 %
MCH RBC QN AUTO: 29.3 PG
MCHC RBC AUTO-ENTMCNC: 33.7 G/DL
MCV RBC AUTO: 87 FL
MONOCYTES # BLD AUTO: 0.7 K/UL
MONOCYTES NFR BLD: 6.4 %
NEUTROPHILS # BLD AUTO: 7.4 K/UL
NEUTROPHILS NFR BLD: 67.5 %
PLATELET # BLD AUTO: 199 K/UL
PMV BLD AUTO: 9.6 FL
RBC # BLD AUTO: 3.51 M/UL
WBC # BLD AUTO: 11.02 K/UL

## 2017-11-22 PROCEDURE — 99024 POSTOP FOLLOW-UP VISIT: CPT | Mod: ,,, | Performed by: OBSTETRICS & GYNECOLOGY

## 2017-11-22 PROCEDURE — 36415 COLL VENOUS BLD VENIPUNCTURE: CPT

## 2017-11-22 PROCEDURE — 85025 COMPLETE CBC W/AUTO DIFF WBC: CPT

## 2017-11-22 PROCEDURE — 11000001 HC ACUTE MED/SURG PRIVATE ROOM

## 2017-11-22 PROCEDURE — 25000003 PHARM REV CODE 250: Performed by: STUDENT IN AN ORGANIZED HEALTH CARE EDUCATION/TRAINING PROGRAM

## 2017-11-22 RX ORDER — IBUPROFEN 600 MG/1
600 TABLET ORAL EVERY 6 HOURS
Qty: 30 TABLET | Refills: 1 | Status: SHIPPED | OUTPATIENT
Start: 2017-11-22 | End: 2019-11-06

## 2017-11-22 RX ORDER — HYDROCODONE BITARTRATE AND ACETAMINOPHEN 5; 325 MG/1; MG/1
1 TABLET ORAL EVERY 4 HOURS PRN
Qty: 20 TABLET | Refills: 0 | Status: SHIPPED | OUTPATIENT
Start: 2017-11-22 | End: 2019-11-06

## 2017-11-22 RX ADMIN — IBUPROFEN 600 MG: 600 TABLET, FILM COATED ORAL at 12:11

## 2017-11-22 RX ADMIN — HYDROCODONE BITARTRATE AND ACETAMINOPHEN 1 TABLET: 10; 325 TABLET ORAL at 02:11

## 2017-11-22 RX ADMIN — IBUPROFEN 600 MG: 600 TABLET, FILM COATED ORAL at 06:11

## 2017-11-22 RX ADMIN — IBUPROFEN 600 MG: 600 TABLET, FILM COATED ORAL at 11:11

## 2017-11-22 RX ADMIN — HYDROCODONE BITARTRATE AND ACETAMINOPHEN 1 TABLET: 10; 325 TABLET ORAL at 04:11

## 2017-11-22 NOTE — ANESTHESIA POSTPROCEDURE EVALUATION
Anesthesia Post Evaluation    Patient: Ann-Marie Saavedra    Procedure(s) Performed: * No procedures listed *    Final Anesthesia Type: epidural  Patient location during evaluation: floor  Patient participation: Yes- Able to Participate  Level of consciousness: awake and alert and oriented  Post-procedure vital signs: reviewed and stable  Pain management: adequate  Airway patency: patent  PONV status at discharge: No PONV  Anesthetic complications: no      Cardiovascular status: blood pressure returned to baseline and hemodynamically stable  Respiratory status: unassisted, spontaneous ventilation and room air  Hydration status: euvolemic  Follow-up not needed.        Visit Vitals  BP 97/62   Pulse (!) 58   Temp 36.6 °C (97.9 °F) (Oral)   Resp 18   LMP 02/15/2017   SpO2 (!) 94%   Breastfeeding? Unknown       Pain/Niki Score: Pain Rating Prior to Med Admin: 6 (11/22/2017  6:08 AM)  Pain Rating Post Med Admin: 4 (11/22/2017  3:19 AM)

## 2017-11-22 NOTE — PROGRESS NOTES
[facility]  Obstetrics  Postpartum Progress Note    Patient Name: Ann-Marie Saavedra  MRN: [id number]  Admission Date: 2017  Hospital Length of Stay: 1 days  Attending Physician: Tali Hernandez MD  Primary Care Provider: Tali Hernandez MD    Subjective:     Principal Problem: (spontaneous vaginal delivery)    Hospital course: 2017 Admit to L&D for IOL with pitocin/AROM as indicated.   2017 - PPD #1 s/p . Doing well and meeting PP milestones.     Interval History:     She is doing well this morning. She is tolerating a regular diet without nausea or vomiting. She is voiding spontaneously. She is ambulating. She has passed flatus, and has not a BM. Vaginal bleeding is mild. She denies fever or chills. Abdominal pain is mild and controlled with oral medications. She is breastfeeding.     Objective:     Vital Signs (Most Recent):  Temp: 98.6 °F (37 °C) (17 2353)  Pulse: 71 (17 2353)  Resp: 18 (17 2353)  BP: 107/70 (17 2353)  SpO2: 98 % (173) Vital Signs (24h Range):  Temp:  [97 °F (36.1 °C)-98.6 °F (37 °C)] 98.6 °F (37 °C)  Pulse:  [46-71] 71  Resp:  [18] 18  SpO2:  [95 %-100 %] 98 %  BP: ()/(50-94) 107/70        There is no height or weight on file to calculate BMI.      Intake/Output Summary (Last 24 hours) at 17 0750  Last data filed at 17 0530   Gross per 24 hour   Intake           1114.4 ml   Output             2955 ml   Net          -1840.6 ml       Significant Labs:  Lab Results   Component Value Date    GROUPTRH O POS 2017    HEPBSAG Negative 2017    STREPBCULT  10/26/2017     STREPTOCOCCUS AGALACTIAE (GROUP B)  Beta-hemolytic streptococci are routinely susceptible to   penicillins,cephalosporins and carbapenems.         Recent Labs  Lab 17  0516   HGB 10.3*   HCT 30.6*       I have personallly reviewed all pertinent lab results from the last 24 hours.    Physical Exam:   Constitutional: She is  oriented to person, place, and time. She appears well-developed and well-nourished. No distress.    HENT:   Head: Normocephalic and atraumatic.    Eyes: Conjunctivae are normal.    Neck: Normal range of motion.    Cardiovascular: Normal rate, regular rhythm, normal heart sounds and intact distal pulses.     Pulmonary/Chest: Effort normal and breath sounds normal.        Abdominal: Soft. Bowel sounds are normal. She exhibits no distension. There is tenderness (mild and appropriate). There is no rebound and no guarding.             Musculoskeletal: Normal range of motion and moves all extremeties.       Neurological: She is alert and oriented to person, place, and time.    Skin: Skin is warm and dry. She is not diaphoretic.    Psychiatric: She has a normal mood and affect. Her behavior is normal. Judgment and thought content normal.       Assessment/Plan:     37 y.o. female  for:    *  (spontaneous vaginal delivery)    Postpartum care:  - Patient doing well. Continue routine management and advances.  - Continue PO pain meds. Pain well controlled.  - Encourage ambulation.   - Heme: Pre Delivery h/h  --> Post Delivery h/h 10/30  - Contraception - deferred  - Lactation - The patient is breast feeding. Lactation nurse following along PRN  - Rh Status - POS            Disposition: As patient meets milestones, will plan to discharge on PPD #2.    John Robles MD  Obstetrics  [facility]

## 2017-11-22 NOTE — SUBJECTIVE & OBJECTIVE
Hospital course: 2017 Admit to L&D for IOL with pitocin/AROM as indicated.   2017 - PPD #1 s/p . Doing well and meeting PP milestones.     Interval History:     She is doing well this morning. She is tolerating a regular diet without nausea or vomiting. She is voiding spontaneously. She is ambulating. She has passed flatus, and has not a BM. Vaginal bleeding is mild. She denies fever or chills. Abdominal pain is mild and controlled with oral medications. She is breastfeeding.     Objective:     Vital Signs (Most Recent):  Temp: 98.6 °F (37 °C) (17)  Pulse: 71 (17)  Resp: 18 (17)  BP: 107/70 (17)  SpO2: 98 % (17) Vital Signs (24h Range):  Temp:  [97 °F (36.1 °C)-98.6 °F (37 °C)] 98.6 °F (37 °C)  Pulse:  [46-71] 71  Resp:  [18] 18  SpO2:  [95 %-100 %] 98 %  BP: ()/(50-94) 107/70        There is no height or weight on file to calculate BMI.      Intake/Output Summary (Last 24 hours) at 17 0750  Last data filed at 17 0530   Gross per 24 hour   Intake           1114.4 ml   Output             2955 ml   Net          -1840.6 ml       Significant Labs:  Lab Results   Component Value Date    GROUPTRH O POS 2017    HEPBSAG Negative 2017    STREPBCULT  10/26/2017     STREPTOCOCCUS AGALACTIAE (GROUP B)  Beta-hemolytic streptococci are routinely susceptible to   penicillins,cephalosporins and carbapenems.         Recent Labs  Lab 17  0516   HGB 10.3*   HCT 30.6*       I have personallly reviewed all pertinent lab results from the last 24 hours.    Physical Exam:   Constitutional: She is oriented to person, place, and time. She appears well-developed and well-nourished. No distress.    HENT:   Head: Normocephalic and atraumatic.    Eyes: Conjunctivae are normal.    Neck: Normal range of motion.    Cardiovascular: Normal rate, regular rhythm, normal heart sounds and intact distal pulses.     Pulmonary/Chest: Effort normal and  breath sounds normal.        Abdominal: Soft. Bowel sounds are normal. She exhibits no distension. There is tenderness (mild and appropriate). There is no rebound and no guarding.             Musculoskeletal: Normal range of motion and moves all extremeties.       Neurological: She is alert and oriented to person, place, and time.    Skin: Skin is warm and dry. She is not diaphoretic.    Psychiatric: She has a normal mood and affect. Her behavior is normal. Judgment and thought content normal.

## 2017-11-22 NOTE — LACTATION NOTE
11/22/17 1325   Maternal Infant Feeding   Time Spent (min) 0-15 min   Breastfeeding Education adequate infant intake;importance of skin-to-skin contact   Lactation Referrals   Lactation Consult Knowledge deficit   Lactation Interventions   Attachment Promotion counseling provided;family involvement promoted   Breastfeeding Assistance both breasts offered each feeding;feeding cue recognition promoted;support offered   Maternal Breastfeeding Support encouragement offered;lactation counseling provided;maternal hydration promoted   Praised patient for breastfeeding; requested she call lactation for assistance;

## 2017-11-22 NOTE — PLAN OF CARE
Problem: Patient Care Overview  Goal: Plan of Care Review   Requested patient call lactation for latch assistance; developed the following plan of care with patient: she will breastfeed baby on cue until content at least 8 times in 24 hours observing for signs of milk transfer; she will wake baby prn; she will avoid bottles, formula and pacifiers;

## 2017-11-22 NOTE — ASSESSMENT & PLAN NOTE
Postpartum care:  - Patient doing well. Continue routine management and advances.  - Continue PO pain meds. Pain well controlled.  - Encourage ambulation.   - Heme: Pre Delivery h/h 12/35 --> Post Delivery h/h 10/30  - Contraception - deferred  - Lactation - The patient is breast feeding. Lactation nurse following along PRN  - Rh Status - POS

## 2017-11-23 VITALS
TEMPERATURE: 98 F | OXYGEN SATURATION: 99 % | HEART RATE: 58 BPM | SYSTOLIC BLOOD PRESSURE: 109 MMHG | RESPIRATION RATE: 18 BRPM | DIASTOLIC BLOOD PRESSURE: 65 MMHG

## 2017-11-23 PROCEDURE — 25000003 PHARM REV CODE 250: Performed by: STUDENT IN AN ORGANIZED HEALTH CARE EDUCATION/TRAINING PROGRAM

## 2017-11-23 RX ADMIN — IBUPROFEN 600 MG: 600 TABLET, FILM COATED ORAL at 06:11

## 2017-11-23 NOTE — SUBJECTIVE & OBJECTIVE
Hospital course: 2017 Admit to L&D for IOL with pitocin/AROM as indicated.   2017 - PPD #1 s/p . Doing well and meeting PP milestones.   2017- PPD#2 s/p . Meeting PP milestones    Interval History:     She is doing well this morning. She is tolerating a regular diet without nausea or vomiting. She is voiding spontaneously. She is ambulating. She has passed flatus, and has not a BM. Vaginal bleeding is mild. She denies fever or chills. Abdominal pain is mild and controlled with oral medications. She is breastfeeding.     Objective:     Vital Signs (Most Recent):  Temp: 97.7 °F (36.5 °C) (17 0000)  Pulse: (!) 52 (17 0000)  Resp: 18 (17 0000)  BP: (!) 107/51 (17 0000)  SpO2: 96 % (17 0000) Vital Signs (24h Range):  Temp:  [97.5 °F (36.4 °C)-97.7 °F (36.5 °C)] 97.7 °F (36.5 °C)  Pulse:  [52-63] 52  Resp:  [18] 18  SpO2:  [96 %] 96 %  BP: (107-109)/(51-59) 107/51        There is no height or weight on file to calculate BMI.    No intake or output data in the 24 hours ending 17 0905    Significant Labs:  Lab Results   Component Value Date    GROUPTRH O POS 2017    HEPBSAG Negative 2017    STREPBCULT  10/26/2017     STREPTOCOCCUS AGALACTIAE (GROUP B)  Beta-hemolytic streptococci are routinely susceptible to   penicillins,cephalosporins and carbapenems.         Recent Labs  Lab 17  0516   HGB 10.3*   HCT 30.6*       I have personallly reviewed all pertinent lab results from the last 24 hours.    Physical Exam:   Constitutional: She is oriented to person, place, and time. She appears well-developed and well-nourished. No distress.    HENT:   Head: Normocephalic and atraumatic.    Eyes: Conjunctivae are normal.    Neck: Normal range of motion.    Cardiovascular: Normal rate, regular rhythm, normal heart sounds and intact distal pulses.     Pulmonary/Chest: Effort normal and breath sounds normal.        Abdominal: Soft. Bowel sounds are normal. She  exhibits no distension. There is tenderness (mild and appropriate). There is no rebound and no guarding.             Musculoskeletal: Normal range of motion and moves all extremeties.       Neurological: She is alert and oriented to person, place, and time.    Skin: Skin is warm and dry. She is not diaphoretic.    Psychiatric: She has a normal mood and affect. Her behavior is normal. Judgment and thought content normal.

## 2017-11-23 NOTE — PROGRESS NOTES
[facility]  Obstetrics  Postpartum Progress Note    Patient Name: Ann-Marie Saavedra  MRN: [id number]  Admission Date: 2017  Hospital Length of Stay: 2 days  Attending Physician: Tali Hernandez MD  Primary Care Provider: Tali Hernandez MD    Subjective:     Principal Problem: (spontaneous vaginal delivery)    Hospital course: 2017 Admit to L&D for IOL with pitocin/AROM as indicated.   2017 - PPD #1 s/p . Doing well and meeting PP milestones.   2017- PPD#2 s/p . Meeting PP milestones    Interval History:     She is doing well this morning. She is tolerating a regular diet without nausea or vomiting. She is voiding spontaneously. She is ambulating. She has passed flatus, and has not a BM. Vaginal bleeding is mild. She denies fever or chills. Abdominal pain is mild and controlled with oral medications. She is breastfeeding.     Objective:     Vital Signs (Most Recent):  Temp: 97.7 °F (36.5 °C) (17 0000)  Pulse: (!) 52 (17 0000)  Resp: 18 (17 0000)  BP: (!) 107/51 (17 0000)  SpO2: 96 % (17 0000) Vital Signs (24h Range):  Temp:  [97.5 °F (36.4 °C)-97.7 °F (36.5 °C)] 97.7 °F (36.5 °C)  Pulse:  [52-63] 52  Resp:  [18] 18  SpO2:  [96 %] 96 %  BP: (107-109)/(51-59) 107/51        There is no height or weight on file to calculate BMI.    No intake or output data in the 24 hours ending 17 0905    Significant Labs:  Lab Results   Component Value Date    GROUPTRH O POS 2017    HEPBSAG Negative 2017    STREPBCULT  10/26/2017     STREPTOCOCCUS AGALACTIAE (GROUP B)  Beta-hemolytic streptococci are routinely susceptible to   penicillins,cephalosporins and carbapenems.         Recent Labs  Lab 17  0516   HGB 10.3*   HCT 30.6*       I have personallly reviewed all pertinent lab results from the last 24 hours.    Physical Exam:   Constitutional: She is oriented to person, place, and time. She appears well-developed and  well-nourished. No distress.    HENT:   Head: Normocephalic and atraumatic.    Eyes: Conjunctivae are normal.    Neck: Normal range of motion.    Cardiovascular: Normal rate, regular rhythm, normal heart sounds and intact distal pulses.     Pulmonary/Chest: Effort normal and breath sounds normal.        Abdominal: Soft. Bowel sounds are normal. She exhibits no distension. There is tenderness (mild and appropriate). There is no rebound and no guarding.             Musculoskeletal: Normal range of motion and moves all extremeties.       Neurological: She is alert and oriented to person, place, and time.    Skin: Skin is warm and dry. She is not diaphoretic.    Psychiatric: She has a normal mood and affect. Her behavior is normal. Judgment and thought content normal.       Assessment/Plan:     37 y.o. female  for:    *  (spontaneous vaginal delivery)    Postpartum care:  - Patient doing well. Continue routine management and advances.  - Continue PO pain meds. Pain well controlled.  - Encourage ambulation.   - Heme: Pre Delivery h/h  --> Post Delivery h/h 10/30  - Contraception - deferred  - Lactation - The patient is breast feeding. Lactation nurse following along PRN  - Rh Status - POS            Disposition: As patient meets milestones, will plan to discharge PPD#2.    Nevin Eubanks MD  Obstetrics  [facility]

## 2017-11-23 NOTE — DISCHARGE SUMMARY
[facility]  Obstetrics  Discharge Summary      Patient Name: Ann-Marie Saavedra  MRN: [id number]  Admission Date: 2017  Hospital Length of Stay: 2 days  Discharge Date and Time:  2017 9:06 AM  Attending Physician: Tali Hernandez MD   Discharging Provider: Nevin Eubanks MD  Primary Care Provider: Tali Hernandez MD    HPI:   Ann-Marie Saavedra is a 37 y.o. [id number] at 38w6d presents for scheduled IOL at term.     This IUP is complicated by GBS+ status, AMA.  Patient denies contractions, denies vaginal bleeding, denies LOF.   Fetal Movement: normal.      * No surgery found *     Hospital Course:   2017 Admit to L&D for IOL with pitocin/AROM as indicated.   2017 - PPD #1 s/p . Doing well and meeting PP milestones.   2017- PPD#2 s/p . Meeting PP milestones        Final Active Diagnoses:    Diagnosis Date Noted POA    PRINCIPAL PROBLEM:   (spontaneous vaginal delivery) [O80] 2017 Not Applicable      Problems Resolved During this Admission:    Diagnosis Date Noted Date Resolved POA    GBS (group B streptococcus) infection [A49.1] 2017 Yes    Normal labor and delivery [O80] 2017 Not Applicable    Encounter for supervision of normal first pregnancy in third trimester [Z34.03] 2015 Not Applicable        Labs:   CBC   Recent Labs  Lab 17  0516   WBC 11.02   HGB 10.3*   HCT 30.6*          Feeding Method: breast    Immunizations     Date Immunization Status Dose Route/Site Given by    17 MMR Incomplete 0.5 mL Subcutaneous/Left deltoid     17 Tdap Incomplete 0.5 mL Intramuscular/Left deltoid           Delivery:    Episiotomy: None   Lacerations: 2nd   Repair suture:     Repair # of packets:     Blood loss (ml): 355     Birth information:  YOB: 2015   Time of birth: 12:24 PM   Sex: female   Delivery type: Vaginal, Spontaneous Delivery   Gestational  Age: 39w0d    Delivery Clinician:      Other providers:       Additional  information:  Forceps:    Vacuum:    Breech:    Observed anomalies      Living?:           APGARS  One minute Five minutes Ten minutes   Skin color:         Heart rate:         Grimace:         Muscle tone:         Breathing:         Totals: 9  9        Placenta: Delivered:       appearance    Pending Diagnostic Studies:     None          Discharged Condition: good    Disposition: Home or Self Care    Follow Up:  Follow-up Information     Tali Hernandez MD. Go in 6 weeks.    Specialties:  Obstetrics and Gynecology, Obstetrics  Why:  Post Partum Visit  Contact information:  [address] [address]  [phone]                 Patient Instructions:     Diet general     Activity as tolerated     Call MD for:  increased confusion or weakness     Call MD for:  persistent dizziness, light-headedness, or visual disturbances     Call MD for:  worsening rash     Call MD for:  severe persistent headache     Call MD for:  difficulty breathing or increased cough     Call MD for:  redness, tenderness, or signs of infection (pain, swelling, redness, odor or green/yellow discharge around incision site)     Call MD for:  severe uncontrolled pain     Call MD for:  persistent nausea and vomiting or diarrhea     Call MD for:  temperature >100.4       Medications:  Current Discharge Medication List      START taking these medications    Details   hydrocodone-acetaminophen 5-325mg (NORCO) 5-325 mg per tablet Take 1 tablet by mouth every 4 (four) hours as needed.  Qty: 20 tablet, Refills: 0      ibuprofen (ADVIL,MOTRIN) 600 MG tablet Take 1 tablet (600 mg total) by mouth every 6 (six) hours.  Qty: 30 tablet, Refills: 1         CONTINUE these medications which have NOT CHANGED    Details   PRENATAL VIT37/IRON/FOLIC ACID (PRENATA ORAL) Take by mouth.             Nevin Eubanks MD  Obstetrics  [facility]

## 2018-01-09 ENCOUNTER — POSTPARTUM VISIT (OUTPATIENT)
Dept: OBSTETRICS AND GYNECOLOGY | Facility: CLINIC | Age: 38
End: 2018-01-09
Payer: COMMERCIAL

## 2018-01-09 PROCEDURE — 0503F POSTPARTUM CARE VISIT: CPT | Mod: S$GLB,,, | Performed by: OBSTETRICS & GYNECOLOGY

## 2018-01-09 PROCEDURE — 99999 PR PBB SHADOW E&M-EST. PATIENT-LVL II: CPT | Mod: PBBFAC,,, | Performed by: OBSTETRICS & GYNECOLOGY

## 2018-01-09 NOTE — PROGRESS NOTES
CC: Post-partum follow-up    Ann-Marie Saavedra is a 37 y.o. female  presents for post-partum visit s/p a .    Delivery Date: 2017  Delivery MD: Tali Hernandez  Gender: female  Birth Weight: 7 pounds 8 ounces  Breast Feeding: YES  Depression: NO  Contraception: IUD    Pregnancy was complicated by: none      There were no vitals taken for this visit.    ROS:  GENERAL: No fever, chills, fatigability or weight loss.  VULVAR: No pain, no lesions and no itching.  VAGINAL: No relaxation, no itching, no discharge, no abnormal bleeding and no lesions.  ABDOMEN: No abdominal pain. Denies nausea. Denies vomiting. No diarrhea. No constipation  BREAST: Denies pain. No lumps. No discharge.  URINARY: No incontinence, no nocturia, no frequency and no dysuria.  CARDIOVASCULAR: No chest pain. No shortness of breath. No leg cramps.  NEUROLOGICAL: No headaches. No vision changes.    PHYSICAL EXAM:  PELVIC: EGBUS within normal limits, normal vagina and vulva        Diagnosis:  1. Routine postpartum follow-up        Plan:   - Mirena verify           Patient was counseled today on A.C.S. Pap guidelines and recommendations for yearly pelvic exams and monthly self breast exams; to see her PCP for other health maintenance.    FOLLOW UP: in 1-2 weeks for IUD insertion.

## 2018-01-22 ENCOUNTER — PROCEDURE VISIT (OUTPATIENT)
Dept: OBSTETRICS AND GYNECOLOGY | Facility: CLINIC | Age: 38
End: 2018-01-22
Payer: COMMERCIAL

## 2018-01-22 VITALS
BODY MASS INDEX: 22.64 KG/M2 | HEIGHT: 66 IN | SYSTOLIC BLOOD PRESSURE: 118 MMHG | WEIGHT: 140.88 LBS | DIASTOLIC BLOOD PRESSURE: 62 MMHG

## 2018-01-22 DIAGNOSIS — Z30.430 ENCOUNTER FOR INSERTION OF INTRAUTERINE CONTRACEPTIVE DEVICE: Primary | ICD-10-CM

## 2018-01-22 PROCEDURE — 58300 INSERT INTRAUTERINE DEVICE: CPT | Mod: S$GLB,,, | Performed by: OBSTETRICS & GYNECOLOGY

## 2018-01-22 NOTE — PROCEDURES
Procedures     DATE: January 22nd, 2018    TIME: 9:30 am    PROCEDURE:  Mirena insertion    INDICATION: Contraception    PATIENT CONSENT: She was counseled on the risks, benefits, indications, and alternatives to IUD use.  She understands that with insertion there is a risk of bleeding, infection, and uterine perforation.  All questions are answered.  Consents signed.     LABS: UPT is negative.     Cervical cultures were not performed.    ANESTHESIA: NONE    PROCEDURE NOTE:    Time out performed.  The cervix was visualized with a speculum.  A single tooth tenaculum was placed on the anterior lip of the cervix.  The uterus sounds to 8cm using sterile technique.  A Mirena was loaded and placed high in the uterine fundus without difficulty using sterile technique.  The string was was then cut.  The tenaculum and speculum were removed.    COMPLICATIONS: None    PATIENT DISPOSITION: The patient tolerated the procedure well.    Assessment:  1.  Contraceptive management/IUD insertion    Post IUD placement counseling:  Manage post IUD placement pain with NSAIDS, Tylenol or Rx per Medcard.  IUD danger signs and how to check for strings were discussed.  The IUD needs to be removed in 5 years for Mirena and 10 years for Copper IUD.    Follow up:  4 weeks for string check      Tali Hernandez MD 01/22/2018 9:54 AM

## 2018-07-12 ENCOUNTER — OFFICE VISIT (OUTPATIENT)
Dept: DERMATOLOGY | Facility: CLINIC | Age: 38
End: 2018-07-12
Payer: COMMERCIAL

## 2018-07-12 VITALS — WEIGHT: 140 LBS | BODY MASS INDEX: 22.6 KG/M2

## 2018-07-12 DIAGNOSIS — D22.9 NEVUS OF MULTIPLE SITES: Primary | ICD-10-CM

## 2018-07-12 DIAGNOSIS — L71.9 ROSACEA: ICD-10-CM

## 2018-07-12 DIAGNOSIS — L21.9 SEBORRHEIC DERMATITIS: ICD-10-CM

## 2018-07-12 DIAGNOSIS — Q38.6: ICD-10-CM

## 2018-07-12 PROCEDURE — 3008F BODY MASS INDEX DOCD: CPT | Mod: CPTII,S$GLB,, | Performed by: DERMATOLOGY

## 2018-07-12 PROCEDURE — 99213 OFFICE O/P EST LOW 20 MIN: CPT | Mod: S$GLB,,, | Performed by: DERMATOLOGY

## 2018-07-12 PROCEDURE — 99999 PR PBB SHADOW E&M-EST. PATIENT-LVL III: CPT | Mod: PBBFAC,,, | Performed by: DERMATOLOGY

## 2018-07-12 NOTE — PROGRESS NOTES
Subjective:       Patient ID:  Ann-Marie Saavedra is a 37 y.o. female who presents for   Chief Complaint   Patient presents with    Follow-up     Rosacea    Skin Check     UBSE     History of Present Illness: The patient presents with chief complaint of rash.  Location: face  Duration: months  Signs/Symptoms: none    Prior treatments: none    Also would like mole check, see previous note Dr Elkins bx:  Notes Recorded by Elsi Elkins MD on 10/24/2014 at 4:00 PM  FINAL PATHOLOGIC DIAGNOSIS  [facility] DIAGNOSIS:  1. SKIN, LEFT BACK, SHAVE BIOPSY:  - Intradermal nevus.  2. SKIN, LEFT ABDOMEN, BIOPSY:  - Compound nevus.  MARIA G Woods          Review of Systems   Constitutional: Negative for fever.   Skin: Positive for rash. Negative for itching.   Hematologic/Lymphatic: Does not bruise/bleed easily.        Objective:    Physical Exam   Constitutional: She appears well-developed and well-nourished. No distress.   Neurological: She is alert and oriented to person, place, and time. She is not disoriented.   Psychiatric: She has a normal mood and affect.   Skin:   Areas Examined (abnormalities noted in diagram):   Head / Face Inspection Performed  Neck Inspection Performed  Chest / Axilla Inspection Performed  Abdomen Inspection Performed  Back Inspection Performed  RUE Inspected  LUE Inspection Performed  RLE Inspected  LLE Inspection Performed                   Diagram Legend     Erythematous scaling macule/papule c/w actinic keratosis       Vascular papule c/w angioma      Pigmented verrucoid papule/plaque c/w seborrheic keratosis      Yellow umbilicated papule c/w sebaceous hyperplasia      Irregularly shaped tan macule c/w lentigo     1-2 mm smooth white papules consistent with Milia      Movable subcutaneous cyst with punctum c/w epidermal inclusion cyst      Subcutaneous movable cyst c/w pilar cyst      Firm pink to brown papule c/w dermatofibroma      Pedunculated fleshy papule(s) c/w skin tag(s)      Evenly  "pigmented macule c/w junctional nevus     Mildly variegated pigmented, slightly irregular-bordered macule c/w mildly atypical nevus      Flesh colored to evenly pigmented papule c/w intradermal nevus       Pink pearly papule/plaque c/w basal cell carcinoma      Erythematous hyperkeratotic cursted plaque c/w SCC      Surgical scar with no sign of skin cancer recurrence      Open and closed comedones      Inflammatory papules and pustules      Verrucoid papule consistent consistent with wart     Erythematous eczematous patches and plaques     Dystrophic onycholytic nail with subungual debris c/w onychomycosis     Umbilicated papule    Erythematous-base heme-crusted tan verrucoid plaque consistent with inflamed seborrheic keratosis     Erythematous Silvery Scaling Plaque c/w Psoriasis     See annotation      Assessment / Plan:        Nevus of multiple sites  The "ABCD" rules to observe pigmented lesions were reviewed.  Brochure provided      Rosacea in remission  Call if recurrent    Seborrheic dermatitis  cerave lotion bid  Kingsley's granule  reassurance             Follow-up in about 1 year (around 7/12/2019).  "

## 2019-11-06 ENCOUNTER — OFFICE VISIT (OUTPATIENT)
Dept: INTERNAL MEDICINE | Facility: CLINIC | Age: 39
End: 2019-11-06
Payer: COMMERCIAL

## 2019-11-06 VITALS
HEIGHT: 66 IN | BODY MASS INDEX: 21.29 KG/M2 | SYSTOLIC BLOOD PRESSURE: 90 MMHG | WEIGHT: 132.5 LBS | DIASTOLIC BLOOD PRESSURE: 60 MMHG | HEART RATE: 57 BPM | OXYGEN SATURATION: 99 %

## 2019-11-06 DIAGNOSIS — M54.2 NECK PAIN: ICD-10-CM

## 2019-11-06 DIAGNOSIS — Z00.00 ANNUAL PHYSICAL EXAM: Primary | ICD-10-CM

## 2019-11-06 DIAGNOSIS — Z13.1 ENCOUNTER FOR SCREENING FOR DIABETES MELLITUS: ICD-10-CM

## 2019-11-06 DIAGNOSIS — L71.9 ROSACEA: ICD-10-CM

## 2019-11-06 DIAGNOSIS — Z13.6 ENCOUNTER FOR LIPID SCREENING FOR CARDIOVASCULAR DISEASE: ICD-10-CM

## 2019-11-06 DIAGNOSIS — D64.9 NORMOCYTIC ANEMIA: ICD-10-CM

## 2019-11-06 DIAGNOSIS — Z13.220 ENCOUNTER FOR LIPID SCREENING FOR CARDIOVASCULAR DISEASE: ICD-10-CM

## 2019-11-06 DIAGNOSIS — Z23 FLU VACCINE NEED: ICD-10-CM

## 2019-11-06 PROBLEM — O09.521 ELDERLY MULTIGRAVIDA IN FIRST TRIMESTER: Status: RESOLVED | Noted: 2017-05-12 | Resolved: 2019-11-06

## 2019-11-06 PROCEDURE — 99999 PR PBB SHADOW E&M-EST. PATIENT-LVL IV: CPT | Mod: PBBFAC,,, | Performed by: INTERNAL MEDICINE

## 2019-11-06 PROCEDURE — 99999 PR PBB SHADOW E&M-EST. PATIENT-LVL IV: ICD-10-PCS | Mod: PBBFAC,,, | Performed by: INTERNAL MEDICINE

## 2019-11-06 PROCEDURE — 99385 PREV VISIT NEW AGE 18-39: CPT | Mod: S$GLB,,, | Performed by: INTERNAL MEDICINE

## 2019-11-06 PROCEDURE — 99385 PR PREVENTIVE VISIT,NEW,18-39: ICD-10-PCS | Mod: S$GLB,,, | Performed by: INTERNAL MEDICINE

## 2019-11-06 RX ORDER — MELOXICAM 7.5 MG/1
7.5 TABLET ORAL DAILY
Qty: 7 TABLET | Refills: 0 | Status: CANCELLED | OUTPATIENT
Start: 2019-11-06 | End: 2019-11-13

## 2019-11-06 RX ORDER — CYCLOBENZAPRINE HCL 5 MG
5 TABLET ORAL 3 TIMES DAILY PRN
Qty: 30 TABLET | Refills: 1 | Status: SHIPPED | OUTPATIENT
Start: 2019-11-06 | End: 2022-04-07

## 2019-11-06 NOTE — PROGRESS NOTES
Subjective:       Patient ID: Ann-Marie Saavedra is a 38 y.o. female who  has a past medical history of Abnormal Pap smear (2013), Keloid cicatrix, and Wound dehiscence in puerperium, perineal, delivered/postpartum (9/25/2015).    Chief Complaint: Establish Care and Neck Pain     History was obtained from the patient and supplemented through chart review  The patient has not seen a PCP in our system.  -saw OBGYN for postpartum care, IUD    Neck Pain    Episode onset: 4-5 months. The problem occurs intermittently. The problem has been waxing and waning. The pain is associated with nothing. The pain is present in the right side (R posterior neck). The quality of the pain is described as stabbing. The symptoms are aggravated by position. Pertinent negatives include no chest pain, headaches, numbness, tingling, trouble swallowing or weakness. She has tried home exercises for the symptoms.     No h/o trauma, MVC.  Right posterior neck stiffness, reduced ROM, tightness that is positional when she moves her head to the right.  No BUE Paresthesias, weakness.  Sharp pain.  Worsening.  Tried wearing her purse on the other side.  Also picks up her toddlers.  Does not take NSAIDs often.  Is considering seeing a chiropractor.  Is concerned about time commitment for PT sessions.    Rosacea, atypical nevus:  S/p biopsies.  Last saw Dermatology .  Her sisters also a dermatologist, so sometimes performs TBSE.    Normocytic anemia:   During pregnancies in 2015, 2017.  On Mirena IUD.  No menorrhagia.  UTD on Pap smear 2017.  No first-degree relative with colon cancer.  Not on any supplementation.  No results found for: IRON, TIBC, FERRITIN, SATURATEDIRO  No results found for: [id number]  No results found for: FOLATE    Exercise: [employer], long walks    Review of Systems   Constitutional: Negative for activity change and unexpected weight change.   HENT: Negative for hearing loss, rhinorrhea and trouble swallowing.     Eyes: Negative for discharge and visual disturbance.   Respiratory: Negative for chest tightness and wheezing.    Cardiovascular: Negative for chest pain and palpitations.   Gastrointestinal: Negative for blood in stool, constipation, diarrhea and vomiting.   Endocrine: Negative for polydipsia and polyuria.   Genitourinary: Negative for difficulty urinating, dysuria, hematuria and menstrual problem.   Musculoskeletal: Positive for neck pain. Negative for arthralgias and joint swelling.   Skin: Negative for rash and wound.   Neurological: Negative for tingling, weakness, numbness and headaches.   Hematological: Negative for adenopathy.   Psychiatric/Behavioral: Negative for confusion and dysphoric mood.         Past Medical History:   Diagnosis Date    Abnormal Pap smear 2013    no colpo    Keloid cicatrix     under right breast    Wound dehiscence in puerperium, perineal, delivered/postpartum 9/25/2015     Past Surgical History:   Procedure Laterality Date    CYST REMOVAL Right 2009    chest wall    FOOT TENDON SURGERY Bilateral 2008    bunion     Family History   Problem Relation Age of Onset    No Known Problems Father     Hyperlipidemia Mother     Eczema Sister     Colon cancer Maternal Grandfather     Colon cancer Paternal Grandfather 67    Melanoma Neg Hx     Acne Neg Hx     Lupus Neg Hx     Psoriasis Neg Hx     Breast cancer Neg Hx     Ovarian cancer Neg Hx      Social History     Socioeconomic History    Marital status:      Spouse name: Not on file    Number of children: Not on file    Years of education: Not on file    Highest education level: Not on file   Occupational History    Occupation:      Employer: OTHER   Social Needs    Financial resource strain: Not hard at all    Food insecurity:     Worry: Never true     Inability: Never true    Transportation needs:     Medical: No     Non-medical: No   Tobacco Use    Smoking status: Never Smoker    Smokeless  "tobacco: Never Used   Substance and Sexual Activity    Alcohol use: No     Alcohol/week: 5.0 standard drinks     Types: 5 Glasses of wine per week     Frequency: 2-3 times a week     Drinks per session: 1 or 2     Binge frequency: Less than monthly    Drug use: No    Sexual activity: Not Currently     Partners: Male     Birth control/protection: None   Lifestyle    Physical activity:     Days per week: 3 days     Minutes per session: 60 min    Stress: Only a little   Relationships    Social connections:     Talks on phone: More than three times a week     Gets together: More than three times a week     Attends Yazdanism service: Not on file     Active member of club or organization: Yes     Attends meetings of clubs or organizations: More than 4 times per year     Relationship status:    Other Topics Concern    Are you pregnant or think you may be? Not Asked    Breast-feeding Not Asked   Social History Narrative    Not on file     Objective:      Vitals:    11/06/19 1409   BP: 90/60   Pulse: (!) 57   SpO2: 99%   Weight: 60.1 kg (132 lb 7.9 oz)   Height: 5' 6" (1.676 m)      Physical Exam   Constitutional: She appears well-developed and well-nourished. No distress.   HENT:   Head: Normocephalic and atraumatic.   Nose: Nose normal.   Mouth/Throat: Oropharynx is clear and moist. No oropharyngeal exudate.   Eyes: Pupils are equal, round, and reactive to light. EOM are normal. Right eye exhibits no discharge. Left eye exhibits no discharge. No scleral icterus.   Neck: Neck supple. No tracheal deviation present. No thyromegaly present.   Cardiovascular: Normal rate, regular rhythm, normal heart sounds and intact distal pulses.   No murmur heard.  Pulmonary/Chest: Effort normal and breath sounds normal. No respiratory distress. She has no wheezes.   Abdominal: Soft. Bowel sounds are normal. She exhibits no distension. There is no tenderness.   Musculoskeletal: She exhibits no edema or deformity.        " Right shoulder: She exhibits normal range of motion.        Left shoulder: She exhibits normal range of motion.        Cervical back: She exhibits decreased range of motion. She exhibits no tenderness.        Thoracic back: She exhibits no tenderness and no bony tenderness.        Lumbar back: She exhibits no tenderness and no bony tenderness.        Back:    Lymphadenopathy:     She has no cervical adenopathy.   Neurological: She is alert. No cranial nerve deficit. Gait normal.   Skin: Skin is warm and dry. She is not diaphoretic. No erythema.   Psychiatric: She has a normal mood and affect. Her behavior is normal.         Lab Results   Component Value Date    WBC 11.02 11/22/2017    HGB 10.3 (L) 11/22/2017    HCT 30.6 (L) 11/22/2017     11/22/2017     04/18/2017    K 3.6 04/18/2017     04/18/2017    CREATININE 0.7 04/18/2017    BUN 11 04/18/2017    CO2 27 04/18/2017       The ASCVD Risk score ([place] SELENE Jr., et al., 2013) failed to calculate for the following reasons:    The 2013 ASCVD risk score is only valid for ages 40 to 79    (Imaging have been independently reviewed)  Prenatal ultrasound with IUP.    Assessment:       1. Annual physical exam    2. Neck pain    3. Rosacea    4. Normocytic anemia    5. Encounter for lipid screening for cardiovascular disease    6. Encounter for screening for diabetes mellitus    7. Flu vaccine need          Plan:       Ann-Marie was seen today for establish care and neck pain.    Diagnoses and all orders for this visit:    Annual physical exam  Comments:  Encouraged continued exercise.  Orders:  -     CBC auto differential; Future  -     Comprehensive metabolic panel; Future    Neck pain  Comments:  R paraspinal. No s/sx impingement.  Provided exercises with home stretches.  Recommend NSAIDs, muscle relaxant. She will schedule PT if persistent.  Orders:  -     cyclobenzaprine (FLEXERIL) 5 MG tablet; Take 1 tablet (5 mg total) by mouth 3 (three) times daily as  needed for Muscle spasms.  -     Ambulatory Referral to Physical/Occupational Therapy    Rosacea  Comments:  Multiple nevi.  Reschedule Dermatology for TBSE.    Normocytic anemia  Comments:  During pregnancies.  No menorrhagia.  Repeat CBC; if low, check iron panel.  Orders:  -     CBC auto differential; Future    Encounter for lipid screening for cardiovascular disease  -     Lipid panel; Future    Encounter for screening for diabetes mellitus  -     Hemoglobin A1c; Future    Flu vaccine need  Comments:  Advised to obtain vaccine at Pharmacy.    Other orders  -     Cancel: Ferritin; Future  -     Cancel: Iron and TIBC; Future  -     Cancel: Vitamin B12; Future  -     Cancel: Ambulatory referral to Dermatology  -     Cancel: meloxicam (MOBIC) 7.5 MG tablet; Take 1 tablet (7.5 mg total) by mouth once daily. for 7 days  -     [facility]         Side effects of medication(s) were discussed in detail and patient voiced understanding.  Patient will call back for any issues or complications.     RTC in 1 year(s) or sooner PRN.

## 2019-11-08 ENCOUNTER — LAB VISIT (OUTPATIENT)
Dept: LAB | Facility: OTHER | Age: 39
End: 2019-11-08
Attending: INTERNAL MEDICINE
Payer: COMMERCIAL

## 2019-11-08 DIAGNOSIS — Z00.00 ANNUAL PHYSICAL EXAM: ICD-10-CM

## 2019-11-08 DIAGNOSIS — Z13.6 ENCOUNTER FOR LIPID SCREENING FOR CARDIOVASCULAR DISEASE: ICD-10-CM

## 2019-11-08 DIAGNOSIS — Z13.1 ENCOUNTER FOR SCREENING FOR DIABETES MELLITUS: ICD-10-CM

## 2019-11-08 DIAGNOSIS — Z13.220 ENCOUNTER FOR LIPID SCREENING FOR CARDIOVASCULAR DISEASE: ICD-10-CM

## 2019-11-08 DIAGNOSIS — D64.9 NORMOCYTIC ANEMIA: ICD-10-CM

## 2019-11-08 LAB
ALBUMIN SERPL BCP-MCNC: 4.6 G/DL (ref 3.5–5.2)
ALP SERPL-CCNC: 51 U/L (ref 55–135)
ALT SERPL W/O P-5'-P-CCNC: 14 U/L (ref 10–44)
ANION GAP SERPL CALC-SCNC: 9 MMOL/L (ref 8–16)
AST SERPL-CCNC: 20 U/L (ref 10–40)
BASOPHILS # BLD AUTO: 0.03 K/UL (ref 0–0.2)
BASOPHILS NFR BLD: 0.5 % (ref 0–1.9)
BILIRUB SERPL-MCNC: 0.8 MG/DL (ref 0.1–1)
BUN SERPL-MCNC: 15 MG/DL (ref 6–20)
CALCIUM SERPL-MCNC: 9.1 MG/DL (ref 8.7–10.5)
CHLORIDE SERPL-SCNC: 105 MMOL/L (ref 95–110)
CHOLEST SERPL-MCNC: 178 MG/DL (ref 120–199)
CHOLEST/HDLC SERPL: 2.4 {RATIO} (ref 2–5)
CO2 SERPL-SCNC: 25 MMOL/L (ref 23–29)
CREAT SERPL-MCNC: 0.8 MG/DL (ref 0.5–1.4)
DIFFERENTIAL METHOD: ABNORMAL
EOSINOPHIL # BLD AUTO: 0.1 K/UL (ref 0–0.5)
EOSINOPHIL NFR BLD: 1.2 % (ref 0–8)
ERYTHROCYTE [DISTWIDTH] IN BLOOD BY AUTOMATED COUNT: 12.4 % (ref 11.5–14.5)
EST. GFR  (AFRICAN AMERICAN): >60 ML/MIN/1.73 M^2
EST. GFR  (NON AFRICAN AMERICAN): >60 ML/MIN/1.73 M^2
ESTIMATED AVG GLUCOSE: 100 MG/DL (ref 68–131)
GLUCOSE SERPL-MCNC: 82 MG/DL (ref 70–110)
HBA1C MFR BLD HPLC: 5.1 % (ref 4–5.6)
HCT VFR BLD AUTO: 43.2 % (ref 37–48.5)
HDLC SERPL-MCNC: 73 MG/DL (ref 40–75)
HDLC SERPL: 41 % (ref 20–50)
HGB BLD-MCNC: 13.9 G/DL (ref 12–16)
IMM GRANULOCYTES # BLD AUTO: 0.04 K/UL (ref 0–0.04)
IMM GRANULOCYTES NFR BLD AUTO: 0.7 % (ref 0–0.5)
LDLC SERPL CALC-MCNC: 93.8 MG/DL (ref 63–159)
LYMPHOCYTES # BLD AUTO: 1 K/UL (ref 1–4.8)
LYMPHOCYTES NFR BLD: 17.6 % (ref 18–48)
MCH RBC QN AUTO: 29 PG (ref 27–31)
MCHC RBC AUTO-ENTMCNC: 32.2 G/DL (ref 32–36)
MCV RBC AUTO: 90 FL (ref 82–98)
MONOCYTES # BLD AUTO: 0.4 K/UL (ref 0.3–1)
MONOCYTES NFR BLD: 6.5 % (ref 4–15)
NEUTROPHILS # BLD AUTO: 4.2 K/UL (ref 1.8–7.7)
NEUTROPHILS NFR BLD: 73.5 % (ref 38–73)
NONHDLC SERPL-MCNC: 105 MG/DL
NRBC BLD-RTO: 0 /100 WBC
PLATELET # BLD AUTO: 211 K/UL (ref 150–350)
PMV BLD AUTO: 10.3 FL (ref 9.2–12.9)
POTASSIUM SERPL-SCNC: 3.9 MMOL/L (ref 3.5–5.1)
PROT SERPL-MCNC: 7.2 G/DL (ref 6–8.4)
RBC # BLD AUTO: 4.79 M/UL (ref 4–5.4)
SODIUM SERPL-SCNC: 139 MMOL/L (ref 136–145)
TRIGL SERPL-MCNC: 56 MG/DL (ref 30–150)
WBC # BLD AUTO: 5.73 K/UL (ref 3.9–12.7)

## 2019-11-08 PROCEDURE — 85025 COMPLETE CBC W/AUTO DIFF WBC: CPT

## 2019-11-08 PROCEDURE — 80053 COMPREHEN METABOLIC PANEL: CPT

## 2019-11-08 PROCEDURE — 80061 LIPID PANEL: CPT

## 2019-11-08 PROCEDURE — 36415 COLL VENOUS BLD VENIPUNCTURE: CPT

## 2019-11-08 PROCEDURE — 83036 HEMOGLOBIN GLYCOSYLATED A1C: CPT

## 2020-10-02 ENCOUNTER — OFFICE VISIT (OUTPATIENT)
Dept: DERMATOLOGY | Facility: CLINIC | Age: 40
End: 2020-10-02
Payer: COMMERCIAL

## 2020-10-02 VITALS — BODY MASS INDEX: 21.31 KG/M2 | WEIGHT: 132 LBS

## 2020-10-02 DIAGNOSIS — L71.9 ROSACEA: Primary | ICD-10-CM

## 2020-10-02 PROCEDURE — 99213 OFFICE O/P EST LOW 20 MIN: CPT | Mod: S$GLB,,, | Performed by: DERMATOLOGY

## 2020-10-02 PROCEDURE — 99999 PR PBB SHADOW E&M-EST. PATIENT-LVL III: CPT | Mod: PBBFAC,,, | Performed by: DERMATOLOGY

## 2020-10-02 PROCEDURE — 99999 PR PBB SHADOW E&M-EST. PATIENT-LVL III: ICD-10-PCS | Mod: PBBFAC,,, | Performed by: DERMATOLOGY

## 2020-10-02 PROCEDURE — 3008F BODY MASS INDEX DOCD: CPT | Mod: CPTII,S$GLB,, | Performed by: DERMATOLOGY

## 2020-10-02 PROCEDURE — 99213 PR OFFICE/OUTPT VISIT, EST, LEVL III, 20-29 MIN: ICD-10-PCS | Mod: S$GLB,,, | Performed by: DERMATOLOGY

## 2020-10-02 PROCEDURE — 3008F PR BODY MASS INDEX (BMI) DOCUMENTED: ICD-10-PCS | Mod: CPTII,S$GLB,, | Performed by: DERMATOLOGY

## 2020-10-02 RX ORDER — AZELAIC ACID 0.15 G/G
GEL TOPICAL DAILY
Qty: 60 G | Refills: 3 | Status: SHIPPED | OUTPATIENT
Start: 2020-10-02 | End: 2022-04-07

## 2020-10-02 RX ORDER — DOXYCYCLINE HYCLATE 50 MG/1
50 CAPSULE ORAL DAILY
Qty: 30 CAPSULE | Refills: 3 | Status: SHIPPED | OUTPATIENT
Start: 2020-10-02 | End: 2021-02-03

## 2020-10-02 NOTE — PROGRESS NOTES
Subjective:       Patient ID:  Ann-Marie Saavedra is a 39 y.o. female who presents for   Chief Complaint   Patient presents with    Skin Discoloration     face, discoloration, several months     Follow up rosacea had been doing well flared recently no tx.     Skin Discoloration - Initial  Affected locations: face  Signs / symptoms: irritated, redness, inflamed and pain  Aggravated by: nothing  Relieving factors/Treatments tried: nothing        Review of Systems   Constitutional: Negative for fever, chills, weight loss, weight gain, fatigue and malaise.   Skin: Positive for daily sunscreen use, activity-related sunscreen use and wears hat.   Hematologic/Lymphatic: Does not bruise/bleed easily.        Objective:    Physical Exam   Constitutional: She appears well-developed and well-nourished.   Neurological: She is alert and oriented to person, place, and time.   Psychiatric: She has a normal mood and affect.   Skin:   Areas Examined (abnormalities noted in diagram):   Head / Face Inspection Performed  Neck Inspection Performed              Diagram Legend     Erythematous scaling macule/papule c/w actinic keratosis       Vascular papule c/w angioma      Pigmented verrucoid papule/plaque c/w seborrheic keratosis      Yellow umbilicated papule c/w sebaceous hyperplasia      Irregularly shaped tan macule c/w lentigo     1-2 mm smooth white papules consistent with Milia      Movable subcutaneous cyst with punctum c/w epidermal inclusion cyst      Subcutaneous movable cyst c/w pilar cyst      Firm pink to brown papule c/w dermatofibroma      Pedunculated fleshy papule(s) c/w skin tag(s)      Evenly pigmented macule c/w junctional nevus     Mildly variegated pigmented, slightly irregular-bordered macule c/w mildly atypical nevus      Flesh colored to evenly pigmented papule c/w intradermal nevus       Pink pearly papule/plaque c/w basal cell carcinoma      Erythematous hyperkeratotic cursted plaque c/w SCC      Surgical  scar with no sign of skin cancer recurrence      Open and closed comedones      Inflammatory papules and pustules      Verrucoid papule consistent consistent with wart     Erythematous eczematous patches and plaques     Dystrophic onycholytic nail with subungual debris c/w onychomycosis     Umbilicated papule    Erythematous-base heme-crusted tan verrucoid plaque consistent with inflamed seborrheic keratosis     Erythematous Silvery Scaling Plaque c/w Psoriasis     See annotation      Assessment / Plan:        Rosacea  -     doxycycline (VIBRAMYCIN) 50 MG capsule; Take 1 capsule (50 mg total) by mouth once daily.  Dispense: 30 capsule; Refill: 3  knows to dc if ties to conceive  -     FINACEA 15 % gel; Apply topically once daily.  Dispense: 60 g; Refill: 3             Follow up in about 6 months (around 4/2/2021).

## 2020-10-05 ENCOUNTER — PATIENT MESSAGE (OUTPATIENT)
Dept: INTERNAL MEDICINE | Facility: CLINIC | Age: 40
End: 2020-10-05

## 2021-01-22 ENCOUNTER — TELEPHONE (OUTPATIENT)
Dept: OBSTETRICS AND GYNECOLOGY | Facility: CLINIC | Age: 41
End: 2021-01-22

## 2021-03-12 ENCOUNTER — OFFICE VISIT (OUTPATIENT)
Dept: OBSTETRICS AND GYNECOLOGY | Facility: CLINIC | Age: 41
End: 2021-03-12
Payer: COMMERCIAL

## 2021-03-12 VITALS
WEIGHT: 130.38 LBS | BODY MASS INDEX: 20.95 KG/M2 | SYSTOLIC BLOOD PRESSURE: 98 MMHG | HEIGHT: 66 IN | DIASTOLIC BLOOD PRESSURE: 64 MMHG

## 2021-03-12 DIAGNOSIS — Z01.419 ENCOUNTER FOR GYNECOLOGICAL EXAMINATION: Primary | ICD-10-CM

## 2021-03-12 DIAGNOSIS — Z30.432 ENCOUNTER FOR REMOVAL OF INTRAUTERINE CONTRACEPTIVE DEVICE: ICD-10-CM

## 2021-03-12 DIAGNOSIS — Z12.4 PAP SMEAR FOR CERVICAL CANCER SCREENING: ICD-10-CM

## 2021-03-12 DIAGNOSIS — Z11.51 ENCOUNTER FOR SCREENING FOR HUMAN PAPILLOMAVIRUS (HPV): ICD-10-CM

## 2021-03-12 PROCEDURE — 1126F AMNT PAIN NOTED NONE PRSNT: CPT | Mod: S$GLB,,, | Performed by: OBSTETRICS & GYNECOLOGY

## 2021-03-12 PROCEDURE — 99999 PR PBB SHADOW E&M-EST. PATIENT-LVL II: CPT | Mod: PBBFAC,,, | Performed by: OBSTETRICS & GYNECOLOGY

## 2021-03-12 PROCEDURE — 58301 REMOVE INTRAUTERINE DEVICE: CPT | Mod: S$GLB,,, | Performed by: OBSTETRICS & GYNECOLOGY

## 2021-03-12 PROCEDURE — 3008F PR BODY MASS INDEX (BMI) DOCUMENTED: ICD-10-PCS | Mod: CPTII,S$GLB,, | Performed by: OBSTETRICS & GYNECOLOGY

## 2021-03-12 PROCEDURE — 58301 PR REMOVE, INTRAUTERINE DEVICE: ICD-10-PCS | Mod: S$GLB,,, | Performed by: OBSTETRICS & GYNECOLOGY

## 2021-03-12 PROCEDURE — 87624 HPV HI-RISK TYP POOLED RSLT: CPT | Performed by: OBSTETRICS & GYNECOLOGY

## 2021-03-12 PROCEDURE — 88175 CYTOPATH C/V AUTO FLUID REDO: CPT | Performed by: OBSTETRICS & GYNECOLOGY

## 2021-03-12 PROCEDURE — 99385 PREV VISIT NEW AGE 18-39: CPT | Mod: 25,S$GLB,, | Performed by: OBSTETRICS & GYNECOLOGY

## 2021-03-12 PROCEDURE — 1126F PR PAIN SEVERITY QUANTIFIED, NO PAIN PRESENT: ICD-10-PCS | Mod: S$GLB,,, | Performed by: OBSTETRICS & GYNECOLOGY

## 2021-03-12 PROCEDURE — 3008F BODY MASS INDEX DOCD: CPT | Mod: CPTII,S$GLB,, | Performed by: OBSTETRICS & GYNECOLOGY

## 2021-03-12 PROCEDURE — 99385 PR PREVENTIVE VISIT,NEW,18-39: ICD-10-PCS | Mod: 25,S$GLB,, | Performed by: OBSTETRICS & GYNECOLOGY

## 2021-03-12 PROCEDURE — 99999 PR PBB SHADOW E&M-EST. PATIENT-LVL II: ICD-10-PCS | Mod: PBBFAC,,, | Performed by: OBSTETRICS & GYNECOLOGY

## 2021-03-20 LAB
CLINICAL INFO: NORMAL
CYTO CVX: NORMAL
CYTOLOGIST CVX/VAG CYTO: NORMAL
CYTOLOGY CMNT CVX/VAG CYTO-IMP: NORMAL
CYTOLOGY PAP THIN PREP EXPLANATION: NORMAL
DATE OF PREVIOUS PAP: NORMAL
DATE PREVIOUS BX: NO
HPV I/H RISK 4 DNA CVX QL NAA+PROBE: NOT DETECTED
LMP START DATE: NORMAL
SPECIMEN SOURCE CVX/VAG CYTO: NORMAL
STAT OF ADQ CVX/VAG CYTO-IMP: NORMAL

## 2021-07-07 ENCOUNTER — PATIENT MESSAGE (OUTPATIENT)
Dept: ADMINISTRATIVE | Facility: HOSPITAL | Age: 41
End: 2021-07-07

## 2021-08-25 ENCOUNTER — PATIENT MESSAGE (OUTPATIENT)
Dept: INTERNAL MEDICINE | Facility: CLINIC | Age: 41
End: 2021-08-25

## 2021-10-04 ENCOUNTER — PATIENT MESSAGE (OUTPATIENT)
Dept: ADMINISTRATIVE | Facility: HOSPITAL | Age: 41
End: 2021-10-04

## 2022-01-14 ENCOUNTER — TELEPHONE (OUTPATIENT)
Dept: INTERNAL MEDICINE | Facility: CLINIC | Age: 42
End: 2022-01-14
Payer: COMMERCIAL

## 2022-03-11 ENCOUNTER — PATIENT MESSAGE (OUTPATIENT)
Dept: ADMINISTRATIVE | Facility: HOSPITAL | Age: 42
End: 2022-03-11
Payer: COMMERCIAL

## 2022-03-11 ENCOUNTER — PATIENT OUTREACH (OUTPATIENT)
Dept: ADMINISTRATIVE | Facility: HOSPITAL | Age: 42
End: 2022-03-11
Payer: COMMERCIAL

## 2022-03-11 DIAGNOSIS — Z00.00 WELLNESS EXAMINATION: ICD-10-CM

## 2022-03-11 DIAGNOSIS — Z11.59 NEED FOR HEPATITIS C SCREENING TEST: Primary | ICD-10-CM

## 2022-03-17 ENCOUNTER — PATIENT OUTREACH (OUTPATIENT)
Dept: ADMINISTRATIVE | Facility: HOSPITAL | Age: 42
End: 2022-03-17
Payer: COMMERCIAL

## 2022-03-24 ENCOUNTER — LAB VISIT (OUTPATIENT)
Dept: LAB | Facility: OTHER | Age: 42
End: 2022-03-24
Attending: INTERNAL MEDICINE
Payer: COMMERCIAL

## 2022-03-24 DIAGNOSIS — Z11.59 NEED FOR HEPATITIS C SCREENING TEST: ICD-10-CM

## 2022-03-24 DIAGNOSIS — Z00.00 WELLNESS EXAMINATION: ICD-10-CM

## 2022-03-24 LAB
ALBUMIN SERPL BCP-MCNC: 4.2 G/DL (ref 3.5–5.2)
ALP SERPL-CCNC: 59 U/L (ref 55–135)
ALT SERPL W/O P-5'-P-CCNC: 17 U/L (ref 10–44)
ANION GAP SERPL CALC-SCNC: 11 MMOL/L (ref 8–16)
AST SERPL-CCNC: 21 U/L (ref 10–40)
BASOPHILS # BLD AUTO: 0.04 K/UL (ref 0–0.2)
BASOPHILS NFR BLD: 0.7 % (ref 0–1.9)
BILIRUB SERPL-MCNC: 0.8 MG/DL (ref 0.1–1)
BUN SERPL-MCNC: 12 MG/DL (ref 6–20)
CALCIUM SERPL-MCNC: 8.9 MG/DL (ref 8.7–10.5)
CHLORIDE SERPL-SCNC: 109 MMOL/L (ref 95–110)
CHOLEST SERPL-MCNC: 183 MG/DL (ref 120–199)
CHOLEST/HDLC SERPL: 2.4 {RATIO} (ref 2–5)
CO2 SERPL-SCNC: 20 MMOL/L (ref 23–29)
CREAT SERPL-MCNC: 0.8 MG/DL (ref 0.5–1.4)
DIFFERENTIAL METHOD: NORMAL
EOSINOPHIL # BLD AUTO: 0.2 K/UL (ref 0–0.5)
EOSINOPHIL NFR BLD: 2.8 % (ref 0–8)
ERYTHROCYTE [DISTWIDTH] IN BLOOD BY AUTOMATED COUNT: 12.3 % (ref 11.5–14.5)
EST. GFR  (AFRICAN AMERICAN): >60 ML/MIN/1.73 M^2
EST. GFR  (NON AFRICAN AMERICAN): >60 ML/MIN/1.73 M^2
ESTIMATED AVG GLUCOSE: 97 MG/DL (ref 68–131)
GLUCOSE SERPL-MCNC: 92 MG/DL (ref 70–110)
HBA1C MFR BLD: 5 % (ref 4–5.6)
HCT VFR BLD AUTO: 42.4 % (ref 37–48.5)
HDLC SERPL-MCNC: 75 MG/DL (ref 40–75)
HDLC SERPL: 41 % (ref 20–50)
HGB BLD-MCNC: 14 G/DL (ref 12–16)
IMM GRANULOCYTES # BLD AUTO: 0.03 K/UL (ref 0–0.04)
IMM GRANULOCYTES NFR BLD AUTO: 0.5 % (ref 0–0.5)
LDLC SERPL CALC-MCNC: 97 MG/DL (ref 63–159)
LYMPHOCYTES # BLD AUTO: 1.3 K/UL (ref 1–4.8)
LYMPHOCYTES NFR BLD: 20.9 % (ref 18–48)
MCH RBC QN AUTO: 30.6 PG (ref 27–31)
MCHC RBC AUTO-ENTMCNC: 33 G/DL (ref 32–36)
MCV RBC AUTO: 93 FL (ref 82–98)
MONOCYTES # BLD AUTO: 0.4 K/UL (ref 0.3–1)
MONOCYTES NFR BLD: 6.6 % (ref 4–15)
NEUTROPHILS # BLD AUTO: 4.2 K/UL (ref 1.8–7.7)
NEUTROPHILS NFR BLD: 68.5 % (ref 38–73)
NONHDLC SERPL-MCNC: 108 MG/DL
NRBC BLD-RTO: 0 /100 WBC
PLATELET # BLD AUTO: 218 K/UL (ref 150–450)
PMV BLD AUTO: 9.9 FL (ref 9.2–12.9)
POTASSIUM SERPL-SCNC: 4 MMOL/L (ref 3.5–5.1)
PROT SERPL-MCNC: 7 G/DL (ref 6–8.4)
RBC # BLD AUTO: 4.58 M/UL (ref 4–5.4)
SODIUM SERPL-SCNC: 140 MMOL/L (ref 136–145)
TRIGL SERPL-MCNC: 55 MG/DL (ref 30–150)
WBC # BLD AUTO: 6.08 K/UL (ref 3.9–12.7)

## 2022-03-24 PROCEDURE — 80061 LIPID PANEL: CPT | Performed by: INTERNAL MEDICINE

## 2022-03-24 PROCEDURE — 83036 HEMOGLOBIN GLYCOSYLATED A1C: CPT | Performed by: INTERNAL MEDICINE

## 2022-03-24 PROCEDURE — 86803 HEPATITIS C AB TEST: CPT | Performed by: INTERNAL MEDICINE

## 2022-03-24 PROCEDURE — 80053 COMPREHEN METABOLIC PANEL: CPT | Performed by: INTERNAL MEDICINE

## 2022-03-24 PROCEDURE — 36415 COLL VENOUS BLD VENIPUNCTURE: CPT | Performed by: INTERNAL MEDICINE

## 2022-03-24 PROCEDURE — 85025 COMPLETE CBC W/AUTO DIFF WBC: CPT | Performed by: INTERNAL MEDICINE

## 2022-03-25 LAB — HCV AB SERPL QL IA: NEGATIVE

## 2022-04-07 ENCOUNTER — OFFICE VISIT (OUTPATIENT)
Dept: INTERNAL MEDICINE | Facility: CLINIC | Age: 42
End: 2022-04-07
Payer: COMMERCIAL

## 2022-04-07 VITALS
SYSTOLIC BLOOD PRESSURE: 100 MMHG | OXYGEN SATURATION: 97 % | DIASTOLIC BLOOD PRESSURE: 60 MMHG | WEIGHT: 132.5 LBS | HEART RATE: 60 BPM | BODY MASS INDEX: 21.29 KG/M2 | HEIGHT: 66 IN

## 2022-04-07 DIAGNOSIS — Z80.0 FAMILY HISTORY OF COLON CANCER: ICD-10-CM

## 2022-04-07 DIAGNOSIS — Z12.31 ENCOUNTER FOR SCREENING MAMMOGRAM FOR MALIGNANT NEOPLASM OF BREAST: ICD-10-CM

## 2022-04-07 DIAGNOSIS — L71.9 ROSACEA: ICD-10-CM

## 2022-04-07 DIAGNOSIS — Z00.00 ANNUAL PHYSICAL EXAM: Primary | ICD-10-CM

## 2022-04-07 PROBLEM — M54.2 NECK PAIN: Status: RESOLVED | Noted: 2019-11-06 | Resolved: 2022-04-07

## 2022-04-07 PROBLEM — D64.9 NORMOCYTIC ANEMIA: Status: RESOLVED | Noted: 2019-11-06 | Resolved: 2022-04-07

## 2022-04-07 PROCEDURE — 1160F PR REVIEW ALL MEDS BY PRESCRIBER/CLIN PHARMACIST DOCUMENTED: ICD-10-PCS | Mod: CPTII,S$GLB,, | Performed by: INTERNAL MEDICINE

## 2022-04-07 PROCEDURE — 3074F PR MOST RECENT SYSTOLIC BLOOD PRESSURE < 130 MM HG: ICD-10-PCS | Mod: CPTII,S$GLB,, | Performed by: INTERNAL MEDICINE

## 2022-04-07 PROCEDURE — 1159F PR MEDICATION LIST DOCUMENTED IN MEDICAL RECORD: ICD-10-PCS | Mod: CPTII,S$GLB,, | Performed by: INTERNAL MEDICINE

## 2022-04-07 PROCEDURE — 1159F MED LIST DOCD IN RCRD: CPT | Mod: CPTII,S$GLB,, | Performed by: INTERNAL MEDICINE

## 2022-04-07 PROCEDURE — 3008F BODY MASS INDEX DOCD: CPT | Mod: CPTII,S$GLB,, | Performed by: INTERNAL MEDICINE

## 2022-04-07 PROCEDURE — 3008F PR BODY MASS INDEX (BMI) DOCUMENTED: ICD-10-PCS | Mod: CPTII,S$GLB,, | Performed by: INTERNAL MEDICINE

## 2022-04-07 PROCEDURE — 99395 PR PREVENTIVE VISIT,EST,18-39: ICD-10-PCS | Mod: S$GLB,,, | Performed by: INTERNAL MEDICINE

## 2022-04-07 PROCEDURE — 99395 PREV VISIT EST AGE 18-39: CPT | Mod: S$GLB,,, | Performed by: INTERNAL MEDICINE

## 2022-04-07 PROCEDURE — 3078F PR MOST RECENT DIASTOLIC BLOOD PRESSURE < 80 MM HG: ICD-10-PCS | Mod: CPTII,S$GLB,, | Performed by: INTERNAL MEDICINE

## 2022-04-07 PROCEDURE — 3074F SYST BP LT 130 MM HG: CPT | Mod: CPTII,S$GLB,, | Performed by: INTERNAL MEDICINE

## 2022-04-07 PROCEDURE — 3078F DIAST BP <80 MM HG: CPT | Mod: CPTII,S$GLB,, | Performed by: INTERNAL MEDICINE

## 2022-04-07 PROCEDURE — 99999 PR PBB SHADOW E&M-EST. PATIENT-LVL III: CPT | Mod: PBBFAC,,, | Performed by: INTERNAL MEDICINE

## 2022-04-07 PROCEDURE — 1160F RVW MEDS BY RX/DR IN RCRD: CPT | Mod: CPTII,S$GLB,, | Performed by: INTERNAL MEDICINE

## 2022-04-07 PROCEDURE — 99999 PR PBB SHADOW E&M-EST. PATIENT-LVL III: ICD-10-PCS | Mod: PBBFAC,,, | Performed by: INTERNAL MEDICINE

## 2022-04-07 PROCEDURE — 3044F PR MOST RECENT HEMOGLOBIN A1C LEVEL <7.0%: ICD-10-PCS | Mod: CPTII,S$GLB,, | Performed by: INTERNAL MEDICINE

## 2022-04-07 PROCEDURE — 3044F HG A1C LEVEL LT 7.0%: CPT | Mod: CPTII,S$GLB,, | Performed by: INTERNAL MEDICINE

## 2022-04-07 NOTE — PROGRESS NOTES
"Subjective:       Patient ID: Ann-Marie Saavedra is a 41 y.o. female who  has a past medical history of Abnormal Pap smear (2013), Keloid cicatrix, and Wound dehiscence in puerperium, perineal, delivered/postpartum (9/25/2015).    Chief Complaint: Annual Exam     History was obtained from the patient and supplemented through chart review  -Saw OBGYN for well-woman exam.    HPI    H/o normocytic anemia during pregnancies in 2015, 2017. Resolved on repeat labs.      Rosacea, atypical nevus:    S/p biopsies.  Her sister is a dermatologist in [place], so performs TBSE.    Has seen [facility] before and Rx p.o. doxycycline, finacea.    MGF c h/o colon cancer. Her mom has undergone earlier screening. No first-degree relative with colon cancer.    Exercise: Hour Blast. 4/week, 60 minutes .    Diet: Eats steak 2-3/week, 3oz. Not much fish. Mostly chicken, rice, broccoli. Eats avocados.   FLP controlled.    ETOH: 4 drinks/week, 1-2 drinks at a time. No binge drinking.    Review of Systems   Constitutional: Negative for activity change and appetite change.   Respiratory: Negative for wheezing.    Cardiovascular: Negative for leg swelling.   Musculoskeletal: Negative for gait problem.   Skin: Negative for rash and wound.   Hematological: Negative for adenopathy.   Psychiatric/Behavioral: Negative for confusion. The patient is not nervous/anxious.        I personally reviewed Past Medical History, Past Surgical History, Social History, and Family History.    Objective:      Vitals:    04/07/22 0935   BP: 100/60   Pulse: 60   SpO2: 97%   Weight: 60.1 kg (132 lb 7.9 oz)   Height: 5' 6" (1.676 m)      Physical Exam  Constitutional:       General: She is not in acute distress.     Appearance: She is well-developed. She is not diaphoretic.   HENT:      Head: Normocephalic and atraumatic.      Nose: Nose normal.      Mouth/Throat:      Pharynx: No oropharyngeal exudate.   Eyes:      General: No scleral icterus.        " Right eye: No discharge.         Left eye: No discharge.   Neck:      Thyroid: No thyromegaly.      Trachea: No tracheal deviation.   Cardiovascular:      Rate and Rhythm: Normal rate and regular rhythm.      Heart sounds: Normal heart sounds. No murmur heard.  Pulmonary:      Effort: Pulmonary effort is normal. No respiratory distress.      Breath sounds: Normal breath sounds. No wheezing.   Abdominal:      General: Bowel sounds are normal. There is no distension.      Palpations: Abdomen is soft.      Tenderness: There is no abdominal tenderness.   Musculoskeletal:         General: No deformity.      Cervical back: Neck supple.      Right lower leg: No edema.      Left lower leg: No edema.   Lymphadenopathy:      Cervical: No cervical adenopathy.   Skin:     General: Skin is warm and dry.      Findings: No erythema.   Neurological:      Mental Status: She is alert.      Gait: Gait normal.   Psychiatric:         Behavior: Behavior normal.           Lab Results   Component Value Date    WBC 6.08 03/24/2022    HGB 14.0 03/24/2022    HCT 42.4 03/24/2022     03/24/2022    CHOL 183 03/24/2022    TRIG 55 03/24/2022    HDL 75 03/24/2022    ALT 17 03/24/2022    AST 21 03/24/2022     03/24/2022    K 4.0 03/24/2022     03/24/2022    CREATININE 0.8 03/24/2022    BUN 12 03/24/2022    CO2 20 (L) 03/24/2022    HGBA1C 5.0 03/24/2022       The ASCVD Risk score ([place] SELENE Jr., et al., 2013) failed to calculate for the following reasons:    The 2013 ASCVD risk score is only valid for ages 40 to 79    (Imaging have been independently reviewed)  Prenatal ultrasound with IUP.    Assessment:       1. Annual physical exam    2. Rosacea    3. Encounter for screening mammogram for malignant neoplasm of breast    4. Family history of colon cancer          Plan:       Ann-Marie was seen today for annual exam.    Diagnoses and all orders for this visit:    Annual physical exam  Comments:  Doing well.  Doing well with exercise.  Discussed well balanced diet.     Rosacea  Comments:  Has TBSE with Dermatology.    Encounter for screening mammogram for malignant neoplasm of breast  Comments:  Will be due for MMG after age 42. Unable to schedule at this time.    Family history of colon cancer  Comments:  Her mother has increased risk, but the patient has average risk. Plan on screening at age 47.         Health Maintenance   Topic Date Due    TETANUS VACCINE  09/07/2027    Hepatitis C Screening  Completed    Lipid Panel  Completed     Side effects of medication(s) were discussed in detail and patient voiced understanding.  Patient will call back for any issues or complications.     RTC in 1 year(s) or sooner PRN.

## 2022-04-07 NOTE — PATIENT INSTRUCTIONS
"Thank you for your message. We have been getting a lot of questions like yours.     [facility] is offering COVID-19 vaccinations in accordance to the recommendations of the [facility].  [facility] recommends scheduling your COVID Vaccine via [employer] by following the directions outlined below.      To Schedule your vaccine on the [employer] website:  Choose "Visits" then "Schedule an Appointment" and select the visit tile titled "COVID-19 Vaccine."    To Schedule your vaccine on the [employer] kenneth:  Choose "Appointments" then "Schedule an Appointment" then "Tell us why you're coming in" and select the visit tile titled "COVID Vaccine"      Patients may also call [phone] if they do not have a [facility] account.    More times and dates will become available as we receive more vaccine on a weekly basis. We encourage you to continue to check [facility] as more slots become available and appreciate your patience during this process.    Due to high activity, the [facility] website and COVID hotline may experience a slowdown or long wait times. We sincerely apologize for the inconvenience and appreciate your patience as you try and schedule your vaccination.    We are hopeful that the vaccine will be available to more members of the public soon. We encourage community members and patients to visit [email or web address] or l[facility].[place].gov/coronavirus or covidvaccine.[place].gov for the latest information and resources.     "

## 2022-06-02 ENCOUNTER — OFFICE VISIT (OUTPATIENT)
Dept: OBSTETRICS AND GYNECOLOGY | Facility: CLINIC | Age: 42
End: 2022-06-02
Payer: COMMERCIAL

## 2022-06-02 VITALS
HEIGHT: 66 IN | WEIGHT: 131.19 LBS | BODY MASS INDEX: 21.08 KG/M2 | SYSTOLIC BLOOD PRESSURE: 112 MMHG | DIASTOLIC BLOOD PRESSURE: 58 MMHG

## 2022-06-02 DIAGNOSIS — Z12.31 ENCOUNTER FOR SCREENING MAMMOGRAM FOR MALIGNANT NEOPLASM OF BREAST: ICD-10-CM

## 2022-06-02 DIAGNOSIS — Z01.419 ENCOUNTER FOR GYNECOLOGICAL EXAMINATION: Primary | ICD-10-CM

## 2022-06-02 PROCEDURE — 3078F DIAST BP <80 MM HG: CPT | Mod: CPTII,S$GLB,, | Performed by: OBSTETRICS & GYNECOLOGY

## 2022-06-02 PROCEDURE — 3074F SYST BP LT 130 MM HG: CPT | Mod: CPTII,S$GLB,, | Performed by: OBSTETRICS & GYNECOLOGY

## 2022-06-02 PROCEDURE — 99395 PR PREVENTIVE VISIT,EST,18-39: ICD-10-PCS | Mod: S$GLB,,, | Performed by: OBSTETRICS & GYNECOLOGY

## 2022-06-02 PROCEDURE — 1159F MED LIST DOCD IN RCRD: CPT | Mod: CPTII,S$GLB,, | Performed by: OBSTETRICS & GYNECOLOGY

## 2022-06-02 PROCEDURE — 99999 PR PBB SHADOW E&M-EST. PATIENT-LVL III: CPT | Mod: PBBFAC,,, | Performed by: OBSTETRICS & GYNECOLOGY

## 2022-06-02 PROCEDURE — 3044F PR MOST RECENT HEMOGLOBIN A1C LEVEL <7.0%: ICD-10-PCS | Mod: CPTII,S$GLB,, | Performed by: OBSTETRICS & GYNECOLOGY

## 2022-06-02 PROCEDURE — 3008F PR BODY MASS INDEX (BMI) DOCUMENTED: ICD-10-PCS | Mod: CPTII,S$GLB,, | Performed by: OBSTETRICS & GYNECOLOGY

## 2022-06-02 PROCEDURE — 1159F PR MEDICATION LIST DOCUMENTED IN MEDICAL RECORD: ICD-10-PCS | Mod: CPTII,S$GLB,, | Performed by: OBSTETRICS & GYNECOLOGY

## 2022-06-02 PROCEDURE — 3074F PR MOST RECENT SYSTOLIC BLOOD PRESSURE < 130 MM HG: ICD-10-PCS | Mod: CPTII,S$GLB,, | Performed by: OBSTETRICS & GYNECOLOGY

## 2022-06-02 PROCEDURE — 3008F BODY MASS INDEX DOCD: CPT | Mod: CPTII,S$GLB,, | Performed by: OBSTETRICS & GYNECOLOGY

## 2022-06-02 PROCEDURE — 99395 PREV VISIT EST AGE 18-39: CPT | Mod: S$GLB,,, | Performed by: OBSTETRICS & GYNECOLOGY

## 2022-06-02 PROCEDURE — 99999 PR PBB SHADOW E&M-EST. PATIENT-LVL III: ICD-10-PCS | Mod: PBBFAC,,, | Performed by: OBSTETRICS & GYNECOLOGY

## 2022-06-02 PROCEDURE — 3078F PR MOST RECENT DIASTOLIC BLOOD PRESSURE < 80 MM HG: ICD-10-PCS | Mod: CPTII,S$GLB,, | Performed by: OBSTETRICS & GYNECOLOGY

## 2022-06-02 PROCEDURE — 3044F HG A1C LEVEL LT 7.0%: CPT | Mod: CPTII,S$GLB,, | Performed by: OBSTETRICS & GYNECOLOGY

## 2022-06-02 NOTE — PROGRESS NOTES
HPI: Pt is a 41 y.o.  female who presents for routine annual exam. She uses nothing for contraception as we removed her Mirena last year. She does not want STD screening.     Pap/HPV 3/12/2021 NORMAL.     Turning 42 in November. Still considering a 3rd child.       ROS:  GENERAL: Feeling well overall. Denies fever or chills.   SKIN: Denies rash or lesions.   HEAD: Denies head injury or headache.   NODES: Denies enlarged lymph nodes.   CHEST: Denies chest pain or shortness of breath.   CARDIOVASCULAR: Denies palpitations or left sided chest pain.   ABDOMEN: No abdominal pain, constipation, diarrhea, nausea or vomiting   URINARY: No dysuria, hematuria, or burning on urination.  REPRODUCTIVE: See HPI.   BREASTS: Denies pain, lumps, or nipple discharge.   HEMATOLOGIC: No easy bruisability or excessive bleeding.   MUSCULOSKELETAL: Denies joint pain or swelling.   NEUROLOGIC: Denies syncope or weakness.   PSYCHIATRIC: Denies acute depression or anxiety     PE:   APPEARANCE: Well nourished, well developed, White female in no acute distress.  NODES: no inguinal lymphadenopathy  BREASTS: Symmetrical, no skin changes or visible lesions. No palpable masses, nipple discharge or adenopathy bilaterally.  ABDOMEN: Soft. No tenderness or masses. No distention.   VULVA: No lesions. Normal external female genitalia.  URETHRAL MEATUS: Normal size and location, no lesions, no prolapse.  URETHRA: No masses, tenderness, or prolapse.  VAGINA: Moist. No lesions or lacerations noted. No abnormal discharge present. No odor present.   CERVIX: No lesions or discharge. No cervical motion tenderness.   UTERUS: Normal size, regular shape, mobile, non-tender.  ADNEXA: No tenderness. No fullness or masses palpated in the adnexal regions.   ANUS PERINEUM: Normal.      Diagnosis:  1. Encounter for gynecological examination    2. Encounter for screening mammogram for malignant neoplasm of breast        Plan:     Orders Placed This Encounter    Mammo  Digital Screening [facility] w/ [facility]       Patient was counseled today on the new ACS guidelines for cervical cytology screening as well as the current recommendations for breast cancer screening. She was counseled to follow up with her PCP for other routine health maintenance.     Follow-up with me in 1 year for routine exam; pap in 2 years.

## 2022-10-29 ENCOUNTER — IMMUNIZATION (OUTPATIENT)
Dept: INTERNAL MEDICINE | Facility: CLINIC | Age: 42
End: 2022-10-29
Payer: COMMERCIAL

## 2022-10-29 PROCEDURE — 90686 IIV4 VACC NO PRSV 0.5 ML IM: CPT | Mod: S$GLB,,, | Performed by: INTERNAL MEDICINE

## 2022-10-29 PROCEDURE — 90686 PR FLU VACCINE, QIIV4, NO PRSV, 0.5 ML, IM: ICD-10-PCS | Mod: S$GLB,,, | Performed by: INTERNAL MEDICINE

## 2022-10-29 PROCEDURE — 90471 IMMUNIZATION ADMIN: CPT | Mod: S$GLB,,, | Performed by: INTERNAL MEDICINE

## 2022-10-29 PROCEDURE — 90471 IMMUNIZATION ADMIN: CPT | Mod: PBBFAC

## 2022-10-29 PROCEDURE — 90471 PR IMMUNIZ ADMIN,1 SINGLE/COMB VAC/TOXOID: ICD-10-PCS | Mod: S$GLB,,, | Performed by: INTERNAL MEDICINE

## 2023-01-18 ENCOUNTER — PATIENT MESSAGE (OUTPATIENT)
Dept: ADMINISTRATIVE | Facility: HOSPITAL | Age: 43
End: 2023-01-18
Payer: COMMERCIAL

## 2023-04-18 ENCOUNTER — HOSPITAL ENCOUNTER (OUTPATIENT)
Dept: RADIOLOGY | Facility: OTHER | Age: 43
Discharge: HOME | End: 2023-04-18
Attending: OBSTETRICS & GYNECOLOGY
Payer: COMMERCIAL

## 2023-04-18 VITALS — WEIGHT: 131 LBS | BODY MASS INDEX: 21.14 KG/M2

## 2023-04-18 DIAGNOSIS — Z12.31 ENCOUNTER FOR SCREENING MAMMOGRAM FOR MALIGNANT NEOPLASM OF BREAST: ICD-10-CM

## 2023-04-18 PROCEDURE — 77067 MAMMO DIGITAL SCREENING BILAT WITH TOMO: ICD-10-PCS | Mod: 26,,, | Performed by: RADIOLOGY

## 2023-04-18 PROCEDURE — 77063 MAMMO DIGITAL SCREENING BILAT WITH TOMO: ICD-10-PCS | Mod: 26,,, | Performed by: RADIOLOGY

## 2023-04-18 PROCEDURE — 77063 BREAST TOMOSYNTHESIS BI: CPT | Mod: 26,,, | Performed by: RADIOLOGY

## 2023-04-18 PROCEDURE — 77067 SCR MAMMO BI INCL CAD: CPT | Mod: TC

## 2023-04-18 PROCEDURE — 77067 SCR MAMMO BI INCL CAD: CPT | Mod: 26,,, | Performed by: RADIOLOGY

## 2023-06-21 ENCOUNTER — PATIENT OUTREACH (OUTPATIENT)
Dept: ADMINISTRATIVE | Facility: HOSPITAL | Age: 43
End: 2023-06-21
Payer: COMMERCIAL

## 2023-06-21 NOTE — PROGRESS NOTES
Population Health Chart Review & Patient Outreach Details:     Reason for Outreach Encounter:     []  Non-Compliant Report   []  Payor Report ([employer], PHN, BCBS, MSSP, MCIP, UHC, etc.)   [x]  Pre-Visit Chart Review     Updates Requested / Reviewed:     [x]  Care Everywhere    []     []  External Sources ([facility], [facility], DIS, etc.)   []  Care Team Updated    Patient Outreach Method:    []  Telephone Outreach Completed   [] Successful   [] Left Voicemail   [] Unable to Contact (wrong number, no voicemail)  []  [facility] Outreach Sent  []  Letter Outreach Mailed  []  Fax Sent for External Records  []  External Records Upload    Health Maintenance Topics Addressed and Outreach Outcomes / Actions Taken:        []      Breast Cancer Screening []  Mammo Scheduled      []  External Records Requested     []  Added Reminder to Complete to Upcoming Primary Care Appt Notes     []  Patient Declined     []  Patient Will Call Back to Schedule     []  Patient Will Schedule with External Provider / Order Routed if Applicable             []       Cervical Cancer Screening []  Pap Scheduled      []  External Records Requested     []  Added Reminder to Complete to Upcoming Primary Care Appt Notes     []  Patient Declined     []  Patient Will Call Back to Schedule     []  Patient Will Schedule with External Provider               []          Colorectal Cancer Screening []  Colonoscopy Case Request or Referral Placed     []  External Records Requested     []  Added Reminder to Complete to Upcoming Primary Care Appt Notes     []  Patient Declined     []  Patient Will Call Back to Schedule     []  Patient Will Schedule with External Provider     []  Fit Kit Mailed (add the SmartPhrase under additional notes)     []  Reminded Patient to Complete Home Test             []      Diabetic Eye Exam []  Eye Camera Scheduled or Optometry Referral Placed     []  External Records Requested     []  Added Reminder to Complete to  Upcoming Primary Care Appt Notes     []  Patient Declined     []  Patient Will Call Back to Schedule     []  Patient Will Schedule with External Provider             []      Blood Pressure Control []  Primary Care Follow Up Visit Scheduled     []  Remote Blood Pressure Reading Captured     []  Added Reminder to Complete to Upcoming Primary Care Appt Notes     []  Patient Declined     []  Patient Will Call Back / Patient Will Send Portal Message with Reading     []  Patient Will Call Back to Schedule Provider Visit             []       HbA1c & Other Labs []  Lab Appt Scheduled for Due Labs     []  Primary Care Follow Up Visit Scheduled      []  Reminded Patient to Complete Home Test     []  Added Reminder to Complete to Upcoming Primary Care Appt Notes     []  Patient Declined     []  Patient Will Call Back to Schedule     []  Patient Will Schedule with External Provider / Order Routed if Applicable           []    Schedule Primary Care Appt []  Primary Care Appt Scheduled     []  Patient Declined     []  Patient Will Call Back to Schedule     []  Pt Established with External Provider & Updated Care Team             []      Medication Adherence []  Primary Care Appointment Scheduled     []  Added Reminder to Upcoming Primary Care Appt Notes     []  Patient Reminded to  Prescription     []  Patient Declined, Provider Notified if Needed     []  Sent Provider Message to Review and/or Add Exclusion to Problem List             []      Osteoporosis Screening []  DXA Appointment Scheduled     []  External Records Requested     []  Added Reminder to Complete to Upcoming Primary Care Appt Notes     []  Patient Declined     []  Patient Will Call Back to Schedule     []  Patient Will Schedule with External Provider / Order Routed if Applicable     Additional Care Coordinator Notes:     Links registry triggered  No orders placed due to pt establishing care with new patient     Further Action Needed If Patient Returns  Outreach:

## 2023-07-06 ENCOUNTER — OFFICE VISIT (OUTPATIENT)
Dept: INTERNAL MEDICINE | Facility: CLINIC | Age: 43
End: 2023-07-06
Payer: COMMERCIAL

## 2023-07-06 VITALS
OXYGEN SATURATION: 99 % | SYSTOLIC BLOOD PRESSURE: 114 MMHG | BODY MASS INDEX: 21.4 KG/M2 | DIASTOLIC BLOOD PRESSURE: 70 MMHG | HEART RATE: 61 BPM | WEIGHT: 132.63 LBS

## 2023-07-06 DIAGNOSIS — Z00.00 HEALTH MAINTENANCE EXAMINATION: Primary | ICD-10-CM

## 2023-07-06 PROCEDURE — 1160F RVW MEDS BY RX/DR IN RCRD: CPT | Mod: CPTII,S$GLB,, | Performed by: STUDENT IN AN ORGANIZED HEALTH CARE EDUCATION/TRAINING PROGRAM

## 2023-07-06 PROCEDURE — 1159F MED LIST DOCD IN RCRD: CPT | Mod: CPTII,S$GLB,, | Performed by: STUDENT IN AN ORGANIZED HEALTH CARE EDUCATION/TRAINING PROGRAM

## 2023-07-06 PROCEDURE — 99396 PREV VISIT EST AGE 40-64: CPT | Mod: S$GLB,,, | Performed by: STUDENT IN AN ORGANIZED HEALTH CARE EDUCATION/TRAINING PROGRAM

## 2023-07-06 PROCEDURE — 3074F PR MOST RECENT SYSTOLIC BLOOD PRESSURE < 130 MM HG: ICD-10-PCS | Mod: CPTII,S$GLB,, | Performed by: STUDENT IN AN ORGANIZED HEALTH CARE EDUCATION/TRAINING PROGRAM

## 2023-07-06 PROCEDURE — 99396 PR PREVENTIVE VISIT,EST,40-64: ICD-10-PCS | Mod: S$GLB,,, | Performed by: STUDENT IN AN ORGANIZED HEALTH CARE EDUCATION/TRAINING PROGRAM

## 2023-07-06 PROCEDURE — 3008F BODY MASS INDEX DOCD: CPT | Mod: CPTII,S$GLB,, | Performed by: STUDENT IN AN ORGANIZED HEALTH CARE EDUCATION/TRAINING PROGRAM

## 2023-07-06 PROCEDURE — 99999 PR PBB SHADOW E&M-EST. PATIENT-LVL III: ICD-10-PCS | Mod: PBBFAC,,, | Performed by: STUDENT IN AN ORGANIZED HEALTH CARE EDUCATION/TRAINING PROGRAM

## 2023-07-06 PROCEDURE — 3078F DIAST BP <80 MM HG: CPT | Mod: CPTII,S$GLB,, | Performed by: STUDENT IN AN ORGANIZED HEALTH CARE EDUCATION/TRAINING PROGRAM

## 2023-07-06 PROCEDURE — 3074F SYST BP LT 130 MM HG: CPT | Mod: CPTII,S$GLB,, | Performed by: STUDENT IN AN ORGANIZED HEALTH CARE EDUCATION/TRAINING PROGRAM

## 2023-07-06 PROCEDURE — 1159F PR MEDICATION LIST DOCUMENTED IN MEDICAL RECORD: ICD-10-PCS | Mod: CPTII,S$GLB,, | Performed by: STUDENT IN AN ORGANIZED HEALTH CARE EDUCATION/TRAINING PROGRAM

## 2023-07-06 PROCEDURE — 3078F PR MOST RECENT DIASTOLIC BLOOD PRESSURE < 80 MM HG: ICD-10-PCS | Mod: CPTII,S$GLB,, | Performed by: STUDENT IN AN ORGANIZED HEALTH CARE EDUCATION/TRAINING PROGRAM

## 2023-07-06 PROCEDURE — 3008F PR BODY MASS INDEX (BMI) DOCUMENTED: ICD-10-PCS | Mod: CPTII,S$GLB,, | Performed by: STUDENT IN AN ORGANIZED HEALTH CARE EDUCATION/TRAINING PROGRAM

## 2023-07-06 PROCEDURE — 1160F PR REVIEW ALL MEDS BY PRESCRIBER/CLIN PHARMACIST DOCUMENTED: ICD-10-PCS | Mod: CPTII,S$GLB,, | Performed by: STUDENT IN AN ORGANIZED HEALTH CARE EDUCATION/TRAINING PROGRAM

## 2023-07-06 PROCEDURE — 99999 PR PBB SHADOW E&M-EST. PATIENT-LVL III: CPT | Mod: PBBFAC,,, | Performed by: STUDENT IN AN ORGANIZED HEALTH CARE EDUCATION/TRAINING PROGRAM

## 2023-07-06 RX ORDER — MULTIVITAMIN
1 TABLET ORAL DAILY
COMMUNITY

## 2023-07-06 NOTE — PROGRESS NOTES
Subjective:       Patient ID: Ann-Marie Saavedra is a 42 y.o. female.    Chief Complaint: Health maintenance examination [Z00.00]    Patient is new to me, here to establish care.    Health maintenance -   Family history of colorectal cancer.  Mammogram BI-RADS 1 in .   Denies family history of breast cancer.  Denies family history of ovarian cancer.  Last pap performed .   Denies history of abnormal pap smear.  Denies family history of osteoporosis.  Denies significant family history of cardiac disease.  UTD on Tdap, COVID primary/booster, HPV vaccinations.  Due for COVID bivalent vaccinations.  Never smoker.  Drinks alcohol 3 times weekly, 1-2 drinks per sitting.  Denies drug use.  Completed HIV and hepatitis C screening.  UTD on lipid screening.  Lab Results       Component                Value               Date                       LDLCALC                  97.0                2022            UTD on diabetes screening.  Lab Results       Component                Value               Date                       HGBA1C                   5.0                 2022            Endorses overall healthy diet.   Eating plenty of fresh vegetables, fruits.  Eating mostly lean proteins.   Endorses exercising routinely.  Peloton for cardio, 1-2 times weekly.  Strength training as well.    Has regular menstrual cycles monthly.  Menstruation lasts for 5 days.  Endorses menorrhagia on day 1-2.  Began menarche at age 16.   [id number]  Denies gestational diabetes and HTN.  Currently sexually active with .  Not currently using contraception.    Review of Systems   Constitutional:  Negative for appetite change, chills, fatigue, fever and unexpected weight change.   Respiratory:  Negative for cough and shortness of breath.    Cardiovascular:  Negative for chest pain, palpitations and leg swelling.   Gastrointestinal:  Negative for abdominal pain, constipation, diarrhea, nausea and vomiting.   Skin:   Negative for rash.   Neurological:  Negative for dizziness, syncope, weakness and headaches.       Current Outpatient Medications   Medication Instructions    multivitamin (THERAGRAN) per tablet 1 tablet, Oral, Daily     Objective:      Vitals:    07/06/23 0809   BP: 114/70   Pulse: 61   SpO2: 99%   Weight: 60.2 kg (132 lb 9.7 oz)   PainSc: 0-No pain     Body mass index is 21.4 kg/m².    Physical Exam  Vitals reviewed.   Constitutional:       General: She is not in acute distress.     Appearance: Normal appearance. She is not ill-appearing or diaphoretic.   HENT:      Head: Normocephalic and atraumatic.      Right Ear: Tympanic membrane, ear canal and external ear normal. There is no impacted cerumen.      Left Ear: Tympanic membrane, ear canal and external ear normal. There is no impacted cerumen.      Nose: Nose normal. No rhinorrhea.      Mouth/Throat:      Mouth: Mucous membranes are moist.      Pharynx: Oropharynx is clear. No oropharyngeal exudate or posterior oropharyngeal erythema.   Eyes:      General: No scleral icterus.        Right eye: No discharge.         Left eye: No discharge.      Conjunctiva/sclera: Conjunctivae normal.   Neck:      Thyroid: No thyromegaly or thyroid tenderness.      Trachea: Trachea normal.   Cardiovascular:      Rate and Rhythm: Normal rate and regular rhythm.      Heart sounds: Normal heart sounds. No murmur heard.    No friction rub. No gallop.   Pulmonary:      Effort: Pulmonary effort is normal. No respiratory distress.      Breath sounds: Normal breath sounds. No stridor. No wheezing, rhonchi or rales.   Abdominal:      General: There is no distension.      Palpations: Abdomen is soft.      Tenderness: There is no abdominal tenderness. There is no guarding or rebound.   Musculoskeletal:         General: No swelling or deformity.      Cervical back: Neck supple.   Lymphadenopathy:      Head:      Right side of head: No submandibular or posterior auricular adenopathy.       Left side of head: No submandibular or posterior auricular adenopathy.      Cervical: No cervical adenopathy.      Right cervical: No superficial, deep or posterior cervical adenopathy.     Left cervical: No superficial, deep or posterior cervical adenopathy.      Upper Body:      Right upper body: No supraclavicular adenopathy.      Left upper body: No supraclavicular adenopathy.   Skin:     General: Skin is warm and dry.   Neurological:      General: No focal deficit present.      Mental Status: She is alert. Mental status is at baseline.      Gait: Gait normal.   Psychiatric:         Mood and Affect: Mood normal.         Behavior: Behavior normal.       Assessment:       1. Health maintenance examination        Plan:       Health maintenance examination  Reviewed and discussed age appropriate screenings and immunizations.  RTC in 1 year for annual appointment, sooner PRN.        Elsi Klein MD  7/6/2023

## 2023-09-06 ENCOUNTER — OFFICE VISIT (OUTPATIENT)
Dept: URGENT CARE | Facility: CLINIC | Age: 43
End: 2023-09-06
Payer: COMMERCIAL

## 2023-09-06 VITALS
BODY MASS INDEX: 21.21 KG/M2 | HEART RATE: 62 BPM | DIASTOLIC BLOOD PRESSURE: 79 MMHG | HEIGHT: 66 IN | WEIGHT: 132 LBS | TEMPERATURE: 98 F | SYSTOLIC BLOOD PRESSURE: 116 MMHG | OXYGEN SATURATION: 97 % | RESPIRATION RATE: 18 BRPM

## 2023-09-06 DIAGNOSIS — R05.8 PRODUCTIVE COUGH: ICD-10-CM

## 2023-09-06 DIAGNOSIS — J06.9 VIRAL URI WITH COUGH: Primary | ICD-10-CM

## 2023-09-06 DIAGNOSIS — Z11.52 ENCOUNTER FOR SCREENING FOR COVID-19: ICD-10-CM

## 2023-09-06 LAB
CTP QC/QA: YES
SARS-COV-2 AG RESP QL IA.RAPID: NEGATIVE

## 2023-09-06 PROCEDURE — 99213 PR OFFICE/OUTPT VISIT, EST, LEVL III, 20-29 MIN: ICD-10-PCS | Mod: S$GLB,,, | Performed by: PHYSICIAN ASSISTANT

## 2023-09-06 PROCEDURE — 87811 SARS CORONAVIRUS 2 ANTIGEN POCT, MANUAL READ: ICD-10-PCS | Mod: QW,S$GLB,, | Performed by: PHYSICIAN ASSISTANT

## 2023-09-06 PROCEDURE — 99213 OFFICE O/P EST LOW 20 MIN: CPT | Mod: S$GLB,,, | Performed by: PHYSICIAN ASSISTANT

## 2023-09-06 PROCEDURE — 87811 SARS-COV-2 COVID19 W/OPTIC: CPT | Mod: QW,S$GLB,, | Performed by: PHYSICIAN ASSISTANT

## 2023-09-06 RX ORDER — BENZONATATE 200 MG/1
200 CAPSULE ORAL 3 TIMES DAILY PRN
Qty: 30 CAPSULE | Refills: 0 | Status: SHIPPED | OUTPATIENT
Start: 2023-09-06 | End: 2023-09-16

## 2023-09-06 NOTE — PATIENT INSTRUCTIONS
PLEASE READ YOUR DISCHARGE INSTRUCTIONS ENTIRELY AS IT CONTAINS IMPORTANT INFORMATION.    Patient had covid testing done today.      If Negative and has direct exposure:  Symptom free without fever reducing meds for 24 hours may return to work with a mask on for the next 7-10 days at all times.  Return for COVID testing here if symptoms worsens in 5-7 days. Recommend repeat testing at home every 48 hours for 7 days.  Return sooner for evaluation if new or worsening symptoms.  You may also use home COVID test to check.    Discussed corona virus precautions and reviewed CDC FAC; printed a copy for patient.  I discussed to continue to monitor their symptoms. Discussed that if their symptoms persist or worsen to seek re-evaluation. Clinic vs. ER precautions were given.  Patient verbalized understanding and agreed with the above plan of care.    - Reviewed radiographs and all diagnostic testing with patient/family.    - Rest.  Drink plenty of fluids.    - Tylenol/anti-inflammatory(ibuprofen/motrin/aleve) as directed as needed for fever/pain.  For Tylenol, do not exceed 3000 mg/ day. If no contraindication.    -Below are suggestions for symptomatic relief:              -Salt water gargles to soothe throat pain.              -Chloroseptic spray also helps to numb throat pain.              -Nasal saline spray reduces inflammation and dryness.  Drink hot tea with lemon to help soothe your sore throat.              -Warm face compresses to help with facial sinus pain/pressure.              -Vicks vapor rub at night.              -Flonase OTC or Nasacort OTC  once or twice a day a day for nasal/sinus congestion and runny nose. DON'T USE IF YOU HAVE GLAUCOMA. CHECK WITH YOUR PHARMACIST/PHYSICIAN.              -Simple foods like chicken noodle soup.              -Mucinex DM every 12 hours (ANY COUGH EXPECTORANT--guaifenesin) for cough or chest congestion with mucus    (ANY COUGH SUPPRESSANT--dextromethorphan) helps with  coughing at night. Mucinex-DM if you have chest congestion or sputum (caution if history of high blood pressure or palpitations).              -Zyrtec/Claritin/xyzal during the day time  & Benadryl at night (only if severe runny nose) may help with allergies and runny nose. Add decongestant if you have nasal/sinus congestion/sinus pressure/ear fullness sensation. (see below)     Caution with use of Decongestant meds:  -If you DO NOT have Hypertension or any history of palpitations, it is ok to take over the counter Sudafed or Mucinex D or Allegra-D or Claritin-D or Zyrtec-D.  -If you do take one of the above, it is ok to combine that with plain over the counter Mucinex or Allegra or Claritin or Zyrtec. If, for example, you are taking Zyrtec -D, you can combine that with Mucinex, but not Mucinex-D.  If you are taking Mucinex-D, you can combine that with plain Allegra or Claritin or Zyrtec.     -Do not combine pseudophed or phenylephrine with any other brand allergy-D for DECONGESTANT.   -Or vice versa, you can you take plain allergy medications (allegra/claritin/zyrtec with NO Decongestant) and ADD OTC pseudophed or phenelyphrine 3 times a day. Avoid taking decongestant late at night or with caffeine as it can keep you up or cause jittery feeling.  -If you DO have Hypertension , anxiety, or palpitations, it is safe to take Coricidin HBP for relief of cough, congestion, or sinus symptoms every 4-6hours.      For your GI symptoms:  -Use gatorade/pedialyte or rehydration packets to help stay hydrated. Vitamin water and plain water do not contain rehydrating electrolytes.  -Increase clear liquids (water, gatorade, pedialyte, broths, jello, etc) If you develop nausea/vomiting/diarrhea, Hold off on solids for 12-18 hours. Then advance to BRAT diet (banana, rice, applesauce, tea, toast/crackers), then advance further as tolerated. Avoid spicy or fatty foods.   -Please go to the ER if you experience worsening abdominal pain,  blood in your vomit or stool, high fever, dizziness, fainting, swelling of your abdomen, inability to pass gas or stool, or inability to urinate.       -You must understand that you've received an Urgent Care treatment only and that you may be released before all your medical problems are known or treated. You, the patient, will arrange for follow up care as instructed. Please arrange follow up with your primary medical clinic within 2-5 days if your signs and symptoms have not resolved or worsen.   - Follow up with your PCP or specialty clinic as directed.  You can call [phone] or [phone] to schedule an appointment with the appropriate provider.    - If your condition worsens or fails to improve we recommend that you receive another evaluation at the emergency room immediately or contact your primary medical clinic to discuss your concerns.       Prevention steps for patients with confirmed or suspected COVID-19  Stay home and stay away from family members and friends. The CDC says, you can leave home after these three things have happened: 1) You have had no fever for at least 24 hours (that is one full day of no fever without the use of medicine that reduces fevers) 2) AND other symptoms have improved (for example, when your cough or shortness of breath have improved) 3) AND at least 7 days have passed since your symptoms first appeared OR after 5 days passed from first positive test (with continued mask use on day 6-10 till day #11 from Covid positive test result).  Separate yourself from other people and animals in your home.  Call ahead before visiting your doctor.  Wear a facemask.  Cover your coughs and sneezes.  Wash your hands often with soap and water; hand  can be used, too.  Avoid sharing personal household items.  Wipe down surfaces used daily.  Monitor your symptoms. Seek prompt medical attention if your illness is worsening (e.g., difficulty breathing).   Before seeking care,  call your healthcare provider.  If you have a medical emergency and need to call 911, notify the dispatch personnel that you have, or are being evaluated for COVID-19. If possible, put on a facemask before emergency medical services arrive.      Recommended precautions for household members, intimate partners, and caregivers in a home setting of a patient with symptomatic laboratory-confirmed COVID-19 or a patient under investigation.  Household members, intimate partners, and caregivers in the home setting awaiting tests results have close contact with a person with symptomatic, laboratory-confirmed COVID-19 or a person under investigation. Close contacts should monitor their health; they should call their provider right away if they develop symptoms suggestive of COVID-19 (e.g., fever, cough, shortness of breath).    Close contacts should also follow these recommendations:  Make sure that you understand and can help the patient follow their provider's instructions for medication(s) and care. You should help the patient with basic needs in the home and provide support for getting groceries, prescriptions, and other personal needs.  Monitor the patient's symptoms. If the patient is getting sicker, call his or her healthcare provider and tell them that the patient has laboratory-confirmed COVID-19. If the patient has a medical emergency and you need to call 911, notify the dispatch personnel that the patient has, or is being evaluated for COVID-19.  Household members should stay in another room or be  from the patient. Household members should use a separate bedroom and bathroom, if available.  Prohibit visitors.  Household members should care for any pets in the home.  Make sure that shared spaces in the home have good air flow, such as by an air conditioner or an opened window, weather permitting.  Perform hand hygiene frequently. Wash your hands often with soap and water for at least 20 seconds or use an  alcohol-based hand  (that contains > 60% alcohol) covering all surfaces of your hands and rubbing them together until they feel dry. Soap and water should be used preferentially.  Avoid touching your eyes, nose, and mouth.  The patient should wear a facemask. If the patient is not able to wear a facemask (for example, because it causes trouble breathing), caregivers should wear a mask when they are in the same room as the patient.  Wear a disposable facemask and gloves when you touch or have contact with the patient's blood, stool, or body fluids, such as saliva, sputum, nasal mucus, vomit, urine.  Throw out disposable facemasks and gloves after using them. Do not reuse.  When removing personal protective equipment, first remove and dispose of gloves. Then, immediately clean your hands with soap and water or alcohol-based hand . Next, remove and dispose of facemask, and immediately clean your hands again with soap and water or alcohol-based hand .  You should not share dishes, drinking glasses, cups, eating utensils, towels, bedding, or other items with the patient. After the patient uses these items, you should wash them thoroughly (see below Wash laundry thoroughly).  Clean all high-touch surfaces, such as counters, tabletops, doorknobs, bathroom fixtures, toilets, phones, keyboards, tablets, and bedside tables, every day. Also, clean any surfaces that may have blood, stool, or body fluids on them.  Use a household cleaning spray or wipe, according to the label instructions. Labels contain instructions for safe and effective use of the cleaning product including precautions you should take when applying the product, such as wearing gloves and making sure you have good ventilation during use of the product.  Wash laundry thoroughly.  Immediately remove and wash clothes or bedding that have blood, stool, or body fluids on them.  Wear disposable gloves while handling soiled items and  keep soiled items away from your body. Clean your hands (with soap and water or an alcohol-based hand ) immediately after removing your gloves.  Read and follow directions on labels of laundry or clothing items and detergent. In general, using a normal laundry detergent according to washing machine instructions and dry thoroughly using the warmest temperatures recommended on the clothing label.  Place all used disposable gloves, facemasks, and other contaminated items in a lined container before disposing of them with other household waste. Clean your hands (with soap and water or an alcohol-based hand ) immediately after handling these items. Soap and water should be used preferentially if hands are visibly dirty.  Discuss any additional questions with your state or local health department or healthcare provider. Check available hours when contacting your local health department.    For more information see CDC link below.      https://www.cdc.gov/coronavirus/2019-ncov/hcp/guidance-prevent-spread.html#precautions        Sources:  [facility]      Instructions for Home Care of Patients and Caretakers with Coronavirus Disease 2019  Limit visitors to the home.  Older persons and those that have chronic medical conditions such as diabetes, lung and heart disease are at increased risk for illness.   If possible, patients should use a separate bedroom while recovering. Caregivers and household members should avoid prolonged contact with the patient which means to stay 6 feet away and avoid contact with cough droplets.  When close contact is necessary, wash your hands before and immediately after contact.   Perform hand hygiene frequently. Wash your hands often with soap and water for at least 20 seconds or use an alcohol-based hand , covering all surfaces of your hands and rubbing them together until they feel dry.   Avoid touching your eyes, nose, and  mouth with unwashed hands.  Avoid sharing household items with the patient. You should not share dishes, drinking glasses, cups, eating utensils, towels, bedding, or other items. After the patient uses these items, you should wash them thoroughly.  Wash laundry thoroughly.   Immediately remove and wash clothes or bedding that have blood, stool, or body fluids on them.  Clean all high-touch surfaces, such as counters, tabletops, doorknobs, bathroom fixtures, toilets, phones, keyboards, tablets, and bedside tables, every day.   Use a household cleaning spray or wipe, according to the label instructions. Labels contain instructions for safe and effective use of the cleaning product including precautions you should take when applying the product, such as wearing gloves and making sure you have good ventilation during use of the product.    For more information see CDC link below.      https://www.cdc.gov/coronavirus/2019-ncov/hcp/guidance-prevent-spread.html#precautions               If your symptoms worsen or if you have any other concerns, please contact [facility] at [phone].

## 2023-09-06 NOTE — PROGRESS NOTES
"Subjective:      Patient ID: Ann-Marie Saavedra is a 42 y.o. female.    Vitals:  height is 5' 6" (1.676 m) and weight is 59.9 kg (132 lb). Her oral temperature is 98.2 °F (36.8 °C). Her blood pressure is 116/79 and her pulse is 62. Her respiration is 18 and oxygen saturation is 97%.     Chief Complaint: Cough    This is a 42 y.o. female is already vaccinated for COVID-19 who presents to urgent care clinic for evaluation.  No sick exposure.  Complaining of symptoms started for about days.  Has productive cough and no other symptoms.  Sputum is yellowish tint.  Takes DayQuil 1-2 doses during the daytime she remembers but Delsym night which helped significantly with her cough.      Cough  This is a new problem. The current episode started in the past 7 days. The problem has been gradually improving. The problem occurs constantly. The cough is Productive of sputum. Pertinent negatives include no chest pain, chills, ear congestion, ear pain, fever, headaches, heartburn, hemoptysis, myalgias, nasal congestion, postnasal drip, rash, rhinorrhea, sore throat, shortness of breath, sweats, weight loss or wheezing. The symptoms are aggravated by lying down and cold air. Treatments tried: dayquil and delsym. The treatment provided moderate relief. There is no history of asthma, bronchiectasis, bronchitis, COPD, emphysema, environmental allergies or pneumonia.     Constitution: Negative for activity change, appetite change, chills, sweating, fatigue, fever and generalized weakness.   HENT:  Negative for ear pain, hearing loss, facial swelling, congestion, postnasal drip, sinus pain, sinus pressure, sore throat, trouble swallowing and voice change.    Neck: Negative for neck pain, neck stiffness and painful lymph nodes.   Cardiovascular:  Negative for chest pain, leg swelling, palpitations, sob on exertion and passing out.   Eyes:  Negative for eye discharge, eye pain, photophobia, vision loss, double vision and blurred vision. "   Respiratory:  Positive for cough and sputum production. Negative for chest tightness, bloody sputum, COPD, shortness of breath, stridor, wheezing and asthma.    Gastrointestinal:  Negative for abdominal pain, nausea, vomiting, constipation, diarrhea, bright red blood in stool, rectal bleeding, heartburn and bowel incontinence.   Genitourinary:  Negative for dysuria, frequency, urgency, urine decreased, flank pain, bladder incontinence and hematuria.   Musculoskeletal:  Negative for trauma, joint pain, joint swelling, abnormal ROM of joint, muscle cramps and muscle ache.   Skin:  Negative for color change, pale, rash and wound.   Allergic/Immunologic: Negative for environmental allergies, seasonal allergies, asthma and immunocompromised state.   Neurological:  Negative for dizziness, history of vertigo, light-headedness, passing out, facial drooping, speech difficulty, coordination disturbances, loss of balance, headaches, disorientation, altered mental status, loss of consciousness, numbness, tingling and seizures.   Hematologic/Lymphatic: Negative for swollen lymph nodes, easy bruising/bleeding and trouble clotting. Does not bruise/bleed easily.   Psychiatric/Behavioral:  Negative for altered mental status and disorientation.       Objective:     Physical Exam   Constitutional: She is oriented to person, place, and time. She appears well-developed. She is cooperative. She does not appear ill. No distress.      Comments:Well-appearing, smiling, and interactive with exam     HENT:   Head: Normocephalic.   Ears:   Right Ear: Hearing, external ear and ear canal normal. No no drainage, swelling or tenderness. No mastoid tenderness.   Left Ear: Hearing, external ear and ear canal normal. No no drainage, swelling or tenderness. No mastoid tenderness.   Nose: Nose normal. No rhinorrhea or congestion. Right sinus exhibits no maxillary sinus tenderness and no frontal sinus tenderness. Left sinus exhibits no maxillary sinus  tenderness and no frontal sinus tenderness.   Mouth/Throat: Uvula is midline, oropharynx is clear and moist and mucous membranes are normal. Mucous membranes are moist. No oral lesions. No trismus in the jaw. No uvula swelling. No oropharyngeal exudate, posterior oropharyngeal edema, posterior oropharyngeal erythema or tonsillar abscesses. No tonsillar exudate. Oropharynx is clear.   Eyes: Conjunctivae, EOM and lids are normal. Right eye exhibits no discharge. Left eye exhibits no discharge. Right conjunctiva is not injected. Right conjunctiva has no hemorrhage. Left conjunctiva is not injected. Left conjunctiva has no hemorrhage. Extraocular movement intact vision grossly intact gaze aligned appropriately   Neck: Phonation normal. Neck supple. No neck rigidity present.   Cardiovascular: Normal rate, regular rhythm, normal heart sounds and normal pulses.   No murmur heard.  Pulmonary/Chest: Effort normal and breath sounds normal. No accessory muscle usage. No respiratory distress. She has no wheezes. She exhibits no tenderness.   Abdominal: Normal appearance. She exhibits no distension. Soft. There is no abdominal tenderness. There is no rebound and no guarding.   Musculoskeletal: Normal range of motion.         General: Normal range of motion.      Comments: Moves all extremities with normal tone, strength, and ROM.  Gait normal.   Lymphadenopathy:     She has no cervical adenopathy.        Right cervical: No superficial cervical adenopathy present.       Left cervical: No superficial cervical adenopathy present.   Neurological: no focal deficit. She is alert, oriented to person, place, and time and at baseline. She has normal motor skills and normal sensation. She displays facial symmetry and no dysarthria. She exhibits normal muscle tone. Gait and coordination normal. Coordination normal. GCS eye subscore is 4. GCS verbal subscore is 5. GCS motor subscore is 6.   Skin: Skin is warm, dry and no rash. Capillary  refill takes less than 2 seconds.   Psychiatric: She experiences Normal attention. Her speech is normal and behavior is normal. Thought content normal.   Nursing note and vitals reviewed.    Results for orders placed or performed in visit on 09/06/23   SARS Coronavirus 2 Antigen, POCT Manual Read   Result Value Ref Range    SARS Coronavirus 2 Antigen Negative Negative     Acceptable Yes        Assessment:     1. Viral URI with cough    2. Productive cough    3. Encounter for screening for COVID-19      Note dictated with voice recognition software, please excuse any grammatical errors.    Patient presents with clinical exam findings and history consistent with above.     On exam, patient is nontoxic appearing and vitals are stable.  Patient is essentially neurovascularly intact on exam.      Diagnostic testing results were independently reviewed and interpreted, which were discussed in depth with patient.   Test ordered in clinic:  Rapid COVID antigen negative.  Additionally, previous progress notes/admissions/lab were reviewed and interpreted.  CMP 3/24/22 with Good kidney function and EGFR.  PCP progress note 07/06/2023 reviewed.      Plan:       Viral URI with cough  -     benzonatate (TESSALON) 200 MG capsule; Take 1 capsule (200 mg total) by mouth 3 (three) times daily as needed for Cough (Cough).  Dispense: 30 capsule; Refill: 0  -     dextromethorphan-guaiFENesin  mg (MUCINEX DM)  mg per 12 hr tablet; Take 1 tablet by mouth every 12 (twelve) hours as needed (cough and chest congestion). Take with 1 full glass of water.  Dispense: 30 tablet; Refill: 0    Productive cough  -     SARS Coronavirus 2 Antigen, POCT Manual Read  -     benzonatate (TESSALON) 200 MG capsule; Take 1 capsule (200 mg total) by mouth 3 (three) times daily as needed for Cough (Cough).  Dispense: 30 capsule; Refill: 0  -     dextromethorphan-guaiFENesin  mg (MUCINEX DM)  mg per 12 hr tablet; Take 1  tablet by mouth every 12 (twelve) hours as needed (cough and chest congestion). Take with 1 full glass of water.  Dispense: 30 tablet; Refill: 0    Encounter for screening for COVID-19        Note dictated with voice recognition software, please excuse any grammatical errors.    We had shared decision making for patient's treatment. We discussed side effects/alternatives/benefits/risk and patient would like to proceed with treatment plan. We also discussed other OTC treatment recommendations.    Patient was counseled, explained with the test results meaning, expected course, and answered all of questions. They can also receive results via my chart.      Patient was instructed to return for re-evaluation with urgent care or PCP for continued outpatient workup and management if symptoms do not improve/worsening symptoms. Strict ED versus clinic precautions given in depth.   Guideline, discharge and follow-up instructions given verbally/printed; patient will also receive via [facility]. Patient verbalized understanding and agreed with the entirety of plan of care.       Additional MDM:     Heart Failure Score:   COPD = No        Patient Instructions     PLEASE READ YOUR DISCHARGE INSTRUCTIONS ENTIRELY AS IT CONTAINS IMPORTANT INFORMATION.    Patient had covid testing done today.      If Negative and has direct exposure:  Symptom free without fever reducing meds for 24 hours may return to work with a mask on for the next 7-10 days at all times.  Return for COVID testing here if symptoms worsens in 5-7 days. Recommend repeat testing at home every 48 hours for 7 days.  Return sooner for evaluation if new or worsening symptoms.  You may also use home COVID test to check.    Discussed corona virus precautions and reviewed CDC FAC; printed a copy for patient.  I discussed to continue to monitor their symptoms. Discussed that if their symptoms persist or worsen to seek re-evaluation. Clinic vs. ER precautions were given.   Patient verbalized understanding and agreed with the above plan of care.    - Reviewed radiographs and all diagnostic testing with patient/family.    - Rest.  Drink plenty of fluids.    - Tylenol/anti-inflammatory(ibuprofen/motrin/aleve) as directed as needed for fever/pain.  For Tylenol, do not exceed 3000 mg/ day. If no contraindication.    -Below are suggestions for symptomatic relief:              -Salt water gargles to soothe throat pain.              -Chloroseptic spray also helps to numb throat pain.              -Nasal saline spray reduces inflammation and dryness.  Drink hot tea with lemon to help soothe your sore throat.              -Warm face compresses to help with facial sinus pain/pressure.              -Vicks vapor rub at night.              -Flonase OTC or Nasacort OTC  once or twice a day a day for nasal/sinus congestion and runny nose. DON'T USE IF YOU HAVE GLAUCOMA. CHECK WITH YOUR PHARMACIST/PHYSICIAN.              -Simple foods like chicken noodle soup.              -Mucinex DM every 12 hours (ANY COUGH EXPECTORANT--guaifenesin) for cough or chest congestion with mucus    (ANY COUGH SUPPRESSANT--dextromethorphan) helps with coughing at night. Mucinex-DM if you have chest congestion or sputum (caution if history of high blood pressure or palpitations).              -Zyrtec/Claritin/xyzal during the day time  & Benadryl at night (only if severe runny nose) may help with allergies and runny nose. Add decongestant if you have nasal/sinus congestion/sinus pressure/ear fullness sensation. (see below)     Caution with use of Decongestant meds:  -If you DO NOT have Hypertension or any history of palpitations, it is ok to take over the counter Sudafed or Mucinex D or Allegra-D or Claritin-D or Zyrtec-D.  -If you do take one of the above, it is ok to combine that with plain over the counter Mucinex or Allegra or Claritin or Zyrtec. If, for example, you are taking Zyrtec -D, you can combine that with  Mucinex, but not Mucinex-D.  If you are taking Mucinex-D, you can combine that with plain Allegra or Claritin or Zyrtec.     -Do not combine pseudophed or phenylephrine with any other brand allergy-D for DECONGESTANT.   -Or vice versa, you can you take plain allergy medications (allegra/claritin/zyrtec with NO Decongestant) and ADD OTC pseudophed or phenelyphrine 3 times a day. Avoid taking decongestant late at night or with caffeine as it can keep you up or cause jittery feeling.  -If you DO have Hypertension , anxiety, or palpitations, it is safe to take Coricidin HBP for relief of cough, congestion, or sinus symptoms every 4-6hours.      For your GI symptoms:  -Use gatorade/pedialyte or rehydration packets to help stay hydrated. Vitamin water and plain water do not contain rehydrating electrolytes.  -Increase clear liquids (water, gatorade, pedialyte, broths, jello, etc) If you develop nausea/vomiting/diarrhea, Hold off on solids for 12-18 hours. Then advance to BRAT diet (banana, rice, applesauce, tea, toast/crackers), then advance further as tolerated. Avoid spicy or fatty foods.   -Please go to the ER if you experience worsening abdominal pain, blood in your vomit or stool, high fever, dizziness, fainting, swelling of your abdomen, inability to pass gas or stool, or inability to urinate.       -You must understand that you've received an Urgent Care treatment only and that you may be released before all your medical problems are known or treated. You, the patient, will arrange for follow up care as instructed. Please arrange follow up with your primary medical clinic within 2-5 days if your signs and symptoms have not resolved or worsen.   - Follow up with your PCP or specialty clinic as directed.  You can call [phone] or [phone] to schedule an appointment with the appropriate provider.    - If your condition worsens or fails to improve we recommend that you receive another evaluation at the  emergency room immediately or contact your primary medical clinic to discuss your concerns.       Prevention steps for patients with confirmed or suspected COVID-19  Stay home and stay away from family members and friends. The CDC says, you can leave home after these three things have happened: 1) You have had no fever for at least 24 hours (that is one full day of no fever without the use of medicine that reduces fevers) 2) AND other symptoms have improved (for example, when your cough or shortness of breath have improved) 3) AND at least 7 days have passed since your symptoms first appeared OR after 5 days passed from first positive test (with continued mask use on day 6-10 till day #11 from Covid positive test result).  Separate yourself from other people and animals in your home.  Call ahead before visiting your doctor.  Wear a facemask.  Cover your coughs and sneezes.  Wash your hands often with soap and water; hand  can be used, too.  Avoid sharing personal household items.  Wipe down surfaces used daily.  Monitor your symptoms. Seek prompt medical attention if your illness is worsening (e.g., difficulty breathing).   Before seeking care, call your healthcare provider.  If you have a medical emergency and need to call 911, notify the dispatch personnel that you have, or are being evaluated for COVID-19. If possible, put on a facemask before emergency medical services arrive.      Recommended precautions for household members, intimate partners, and caregivers in a home setting of a patient with symptomatic laboratory-confirmed COVID-19 or a patient under investigation.  Household members, intimate partners, and caregivers in the home setting awaiting tests results have close contact with a person with symptomatic, laboratory-confirmed COVID-19 or a person under investigation. Close contacts should monitor their health; they should call their provider right away if they develop symptoms suggestive of  COVID-19 (e.g., fever, cough, shortness of breath).    Close contacts should also follow these recommendations:  Make sure that you understand and can help the patient follow their provider's instructions for medication(s) and care. You should help the patient with basic needs in the home and provide support for getting groceries, prescriptions, and other personal needs.  Monitor the patient's symptoms. If the patient is getting sicker, call his or her healthcare provider and tell them that the patient has laboratory-confirmed COVID-19. If the patient has a medical emergency and you need to call 911, notify the dispatch personnel that the patient has, or is being evaluated for COVID-19.  Household members should stay in another room or be  from the patient. Household members should use a separate bedroom and bathroom, if available.  Prohibit visitors.  Household members should care for any pets in the home.  Make sure that shared spaces in the home have good air flow, such as by an air conditioner or an opened window, weather permitting.  Perform hand hygiene frequently. Wash your hands often with soap and water for at least 20 seconds or use an alcohol-based hand  (that contains > 60% alcohol) covering all surfaces of your hands and rubbing them together until they feel dry. Soap and water should be used preferentially.  Avoid touching your eyes, nose, and mouth.  The patient should wear a facemask. If the patient is not able to wear a facemask (for example, because it causes trouble breathing), caregivers should wear a mask when they are in the same room as the patient.  Wear a disposable facemask and gloves when you touch or have contact with the patient's blood, stool, or body fluids, such as saliva, sputum, nasal mucus, vomit, urine.  Throw out disposable facemasks and gloves after using them. Do not reuse.  When removing personal protective equipment, first remove and dispose of gloves. Then,  immediately clean your hands with soap and water or alcohol-based hand . Next, remove and dispose of facemask, and immediately clean your hands again with soap and water or alcohol-based hand .  You should not share dishes, drinking glasses, cups, eating utensils, towels, bedding, or other items with the patient. After the patient uses these items, you should wash them thoroughly (see below Wash laundry thoroughly).  Clean all high-touch surfaces, such as counters, tabletops, doorknobs, bathroom fixtures, toilets, phones, keyboards, tablets, and bedside tables, every day. Also, clean any surfaces that may have blood, stool, or body fluids on them.  Use a household cleaning spray or wipe, according to the label instructions. Labels contain instructions for safe and effective use of the cleaning product including precautions you should take when applying the product, such as wearing gloves and making sure you have good ventilation during use of the product.  Wash laundry thoroughly.  Immediately remove and wash clothes or bedding that have blood, stool, or body fluids on them.  Wear disposable gloves while handling soiled items and keep soiled items away from your body. Clean your hands (with soap and water or an alcohol-based hand ) immediately after removing your gloves.  Read and follow directions on labels of laundry or clothing items and detergent. In general, using a normal laundry detergent according to washing machine instructions and dry thoroughly using the warmest temperatures recommended on the clothing label.  Place all used disposable gloves, facemasks, and other contaminated items in a lined container before disposing of them with other household waste. Clean your hands (with soap and water or an alcohol-based hand ) immediately after handling these items. Soap and water should be used preferentially if hands are visibly dirty.  Discuss any additional questions  with your state or local health department or healthcare provider. Check available hours when contacting your local health department.    For more information see CDC link below.      https://www.cdc.gov/coronavirus/2019-ncov/hcp/guidance-prevent-spread.html#precautions        Sources:  [facility], [place] [facility]      Instructions for Home Care of Patients and Caretakers with Coronavirus Disease 2019  Limit visitors to the home.  Older persons and those that have chronic medical conditions such as diabetes, lung and heart disease are at increased risk for illness.   If possible, patients should use a separate bedroom while recovering. Caregivers and household members should avoid prolonged contact with the patient which means to stay 6 feet away and avoid contact with cough droplets.  When close contact is necessary, wash your hands before and immediately after contact.   Perform hand hygiene frequently. Wash your hands often with soap and water for at least 20 seconds or use an alcohol-based hand , covering all surfaces of your hands and rubbing them together until they feel dry.   Avoid touching your eyes, nose, and mouth with unwashed hands.  Avoid sharing household items with the patient. You should not share dishes, drinking glasses, cups, eating utensils, towels, bedding, or other items. After the patient uses these items, you should wash them thoroughly.  Wash laundry thoroughly.   Immediately remove and wash clothes or bedding that have blood, stool, or body fluids on them.  Clean all high-touch surfaces, such as counters, tabletops, doorknobs, bathroom fixtures, toilets, phones, keyboards, tablets, and bedside tables, every day.   Use a household cleaning spray or wipe, according to the label instructions. Labels contain instructions for safe and effective use of the cleaning product including precautions you should take when applying the product, such as wearing gloves and  making sure you have good ventilation during use of the product.    For more information see CDC link below.      https://www.cdc.gov/coronavirus/2019-ncov/hcp/guidance-prevent-spread.html#precautions               If your symptoms worsen or if you have any other concerns, please contact [facility] at [phone].

## 2024-01-03 ENCOUNTER — TELEPHONE (OUTPATIENT)
Dept: OBSTETRICS AND GYNECOLOGY | Facility: CLINIC | Age: 44
End: 2024-01-03
Payer: COMMERCIAL

## 2024-01-03 DIAGNOSIS — Z30.014 ENCOUNTER FOR INITIAL PRESCRIPTION OF INTRAUTERINE CONTRACEPTIVE DEVICE (IUD): Primary | ICD-10-CM

## 2024-01-03 NOTE — TELEPHONE ENCOUNTER
----- Message from Aicha Peña sent at 1/3/2024  1:22 PM CST -----  Pt called said she was returning a call about a reschedule appt she can be reached at [phone]

## 2024-01-30 ENCOUNTER — OFFICE VISIT (OUTPATIENT)
Dept: OBSTETRICS AND GYNECOLOGY | Facility: CLINIC | Age: 44
End: 2024-01-30
Payer: COMMERCIAL

## 2024-01-30 VITALS
DIASTOLIC BLOOD PRESSURE: 64 MMHG | BODY MASS INDEX: 20.3 KG/M2 | WEIGHT: 126.31 LBS | SYSTOLIC BLOOD PRESSURE: 122 MMHG | HEIGHT: 66 IN

## 2024-01-30 DIAGNOSIS — Z12.31 ENCOUNTER FOR SCREENING MAMMOGRAM FOR MALIGNANT NEOPLASM OF BREAST: ICD-10-CM

## 2024-01-30 DIAGNOSIS — Z30.430 ENCOUNTER FOR IUD INSERTION: ICD-10-CM

## 2024-01-30 DIAGNOSIS — Z01.419 ENCOUNTER FOR GYNECOLOGICAL EXAMINATION: Primary | ICD-10-CM

## 2024-01-30 DIAGNOSIS — Z11.51 ENCOUNTER FOR SCREENING FOR HUMAN PAPILLOMAVIRUS (HPV): ICD-10-CM

## 2024-01-30 DIAGNOSIS — Z12.4 PAP SMEAR FOR CERVICAL CANCER SCREENING: ICD-10-CM

## 2024-01-30 PROCEDURE — 99396 PREV VISIT EST AGE 40-64: CPT | Mod: 25,S$GLB,, | Performed by: OBSTETRICS & GYNECOLOGY

## 2024-01-30 PROCEDURE — 58300 INSERT INTRAUTERINE DEVICE: CPT | Mod: S$GLB,,, | Performed by: OBSTETRICS & GYNECOLOGY

## 2024-01-30 PROCEDURE — 1159F MED LIST DOCD IN RCRD: CPT | Mod: CPTII,S$GLB,, | Performed by: OBSTETRICS & GYNECOLOGY

## 2024-01-30 PROCEDURE — 87624 HPV HI-RISK TYP POOLED RSLT: CPT | Performed by: OBSTETRICS & GYNECOLOGY

## 2024-01-30 PROCEDURE — 3008F BODY MASS INDEX DOCD: CPT | Mod: CPTII,S$GLB,, | Performed by: OBSTETRICS & GYNECOLOGY

## 2024-01-30 PROCEDURE — 88175 CYTOPATH C/V AUTO FLUID REDO: CPT | Performed by: PATHOLOGY

## 2024-01-30 PROCEDURE — 3074F SYST BP LT 130 MM HG: CPT | Mod: CPTII,S$GLB,, | Performed by: OBSTETRICS & GYNECOLOGY

## 2024-01-30 PROCEDURE — 3078F DIAST BP <80 MM HG: CPT | Mod: CPTII,S$GLB,, | Performed by: OBSTETRICS & GYNECOLOGY

## 2024-01-30 PROCEDURE — 88141 CYTOPATH C/V INTERPRET: CPT | Mod: ,,, | Performed by: PATHOLOGY

## 2024-01-30 PROCEDURE — 99999 PR PBB SHADOW E&M-EST. PATIENT-LVL III: CPT | Mod: PBBFAC,,, | Performed by: OBSTETRICS & GYNECOLOGY

## 2024-01-30 RX ORDER — INFLUENZA A VIRUS A/GEORGIA/12/2022 CVR-167 (H1N1) ANTIGEN (MDCK CELL DERIVED, PROPIOLACTONE INACTIVATED), INFLUENZA A VIRUS A/DARWIN/11/2021 (H3N2) ANTIGEN (MDCK CELL DERIVED, PROPIOLACTONE INACTIVATED),INFLUENZA B VIRUS B/SINGAPORE/WUH4618/2021 ANTIGEN (MDCK CELL DERIVED, PROPIOLACTONE INACTIVATED),INFLUENZA B VIRUS B/SINGAPORE/INFTT-16-0610/2016 ANTIGEN (MDCK CELL DERIVED, PROPIOLACTONE INACTIVATED) 15; 15; 15; 15 UG/.5ML; UG/.5ML; UG/.5ML; UG/.5ML
INJECTION, SUSPENSION INTRAMUSCULAR
COMMUNITY
Start: 2023-12-12 | End: 2024-04-25

## 2024-01-30 RX ORDER — COVID-19 VACCINE, MRNA 50 UG/.5ML
INJECTION, SUSPENSION INTRAMUSCULAR
COMMUNITY
Start: 2023-12-12

## 2024-01-30 NOTE — PROGRESS NOTES
HPI: Pt is a 43 y.o.  female who presents for routine annual exam. She does not use hormonal contraception, however, she would like to have Mirena inserted today. Previously had a Mirena which we removed in 2022 as she contemplated a 3rd baby. She has decided not to have any more children and would like Mirena back in for cycle control. She does not want STD screening.     Last pap/HPV 3/12/2021 NORMAL  MMG 4/18/2023 NORMAL T-C 12.45%      ROS:  GENERAL: Feeling well overall. Denies fever or chills.   SKIN: Denies rash or lesions.   HEAD: Denies head injury or headache.   NODES: Denies enlarged lymph nodes.   CHEST: Denies chest pain or shortness of breath.   CARDIOVASCULAR: Denies palpitations or left sided chest pain.   ABDOMEN: No abdominal pain, constipation, diarrhea, nausea or vomiting   URINARY: No dysuria, hematuria, or burning on urination.  REPRODUCTIVE: See HPI.   BREASTS: Denies pain, lumps, or nipple discharge.   HEMATOLOGIC: No easy bruisability or excessive bleeding.   MUSCULOSKELETAL: Denies joint pain or swelling.   NEUROLOGIC: Denies syncope or weakness.   PSYCHIATRIC: Denies acute depression or anxiety     PE:   APPEARANCE: Well nourished, well developed, friendly White female in no acute distress.  NODES: no inguinal lymphadenopathy  BREASTS: Symmetrical, no skin changes or visible lesions. No palpable masses, nipple discharge or adenopathy bilaterally.  ABDOMEN: Soft. No tenderness or masses. No distention.   VULVA: No lesions. Normal external female genitalia.  URETHRAL MEATUS: Normal size and location, no lesions, no prolapse.  URETHRA: No masses, tenderness, or prolapse.  VAGINA: Moist. No lesions or lacerations noted. No abnormal discharge present. No odor present.   CERVIX: No lesions or discharge. No cervical motion tenderness.   UTERUS: Normal size, regular shape, mobile, non-tender.  ADNEXA: No tenderness. No fullness or masses palpated in the adnexal regions.   ANUS PERINEUM:  Normal.      PROCEDURE:  Mirena insertion    INDICATION: Contraception    PATIENT CONSENT: She was counseled on the risks, benefits, indications, and alternatives to IUD use.  She understands that with insertion there is a risk of bleeding, infection, and uterine perforation.  All questions are answered.  Consents signed.     LABS: UPT is negative.     Cervical cultures were not performed.    ANESTHESIA: NONE    PROCEDURE NOTE:    Time out performed.  The cervix was visualized with a speculum.  A single tooth tenaculum was placed on the anterior lip of the cervix.  The uterus sounds to 7cm using sterile technique.  A Mirena was loaded and placed high in the uterine fundus without difficulty using sterile technique.  The string was was then cut.  The tenaculum and speculum were removed.    COMPLICATIONS: None    PATIENT DISPOSITION: The patient tolerated the procedure well.    Post IUD placement counseling:  Manage post IUD placement pain with NSAIDS, Tylenol or Rx per [facility].  IUD danger signs and how to check for strings were discussed.  The IUD needs to be removed in 8 years for Mirena     Tali Hernandez MD 01/30/2024 12:50 PM          Diagnosis:  1. Encounter for gynecological examination    2. Pap smear for cervical cancer screening    3. Encounter for screening for human papillomavirus (HPV)    4. Encounter for screening mammogram for malignant neoplasm of breast    5. Encounter for IUD insertion        Plan:     Orders Placed This Encounter    HPV High Risk Genotypes, PCR    Mammo Digital Screening Bilat w/ Diaz    Liquid-Based Pap Smear, Screening    levonorgestreL (MIRENA) 21 mcg/24 hours (8 yrs) 52 mg IUD 1 Intra Uterine Device     - MMG due in 4/2024. Orders placed.     Patient was counseled today on the new ACS guidelines for cervical cytology screening as well as the current recommendations for breast cancer screening. She was counseled to follow up with her PCP for other routine health maintenance.      Follow-up with me in 1 year for routine exam; pap in 3 years.

## 2024-02-06 LAB
FINAL PATHOLOGIC DIAGNOSIS: ABNORMAL
Lab: ABNORMAL

## 2024-02-07 ENCOUNTER — PATIENT MESSAGE (OUTPATIENT)
Dept: OBSTETRICS AND GYNECOLOGY | Facility: CLINIC | Age: 44
End: 2024-02-07
Payer: COMMERCIAL

## 2024-02-07 LAB
HPV HR 12 DNA SPEC QL NAA+PROBE: NEGATIVE
HPV16 AG SPEC QL: NEGATIVE
HPV18 DNA SPEC QL NAA+PROBE: NEGATIVE

## 2024-04-25 ENCOUNTER — OFFICE VISIT (OUTPATIENT)
Dept: INTERNAL MEDICINE | Facility: CLINIC | Age: 44
End: 2024-04-25
Payer: COMMERCIAL

## 2024-04-25 ENCOUNTER — HOSPITAL ENCOUNTER (OUTPATIENT)
Dept: CARDIOLOGY | Facility: OTHER | Age: 44
Discharge: HOME | End: 2024-04-25
Attending: INTERNAL MEDICINE
Payer: COMMERCIAL

## 2024-04-25 DIAGNOSIS — R07.9 CHEST PAIN, UNSPECIFIED TYPE: ICD-10-CM

## 2024-04-25 DIAGNOSIS — Z00.00 ANNUAL PHYSICAL EXAM: ICD-10-CM

## 2024-04-25 DIAGNOSIS — R06.09 DOE (DYSPNEA ON EXERTION): ICD-10-CM

## 2024-04-25 DIAGNOSIS — R06.09 DOE (DYSPNEA ON EXERTION): Primary | ICD-10-CM

## 2024-04-25 LAB
OHS QRS DURATION: 88 MS
OHS QTC CALCULATION: 420 MS

## 2024-04-25 PROCEDURE — 1159F MED LIST DOCD IN RCRD: CPT | Mod: CPTII,95,, | Performed by: INTERNAL MEDICINE

## 2024-04-25 PROCEDURE — 99215 OFFICE O/P EST HI 40 MIN: CPT | Mod: 95,,, | Performed by: INTERNAL MEDICINE

## 2024-04-25 PROCEDURE — 93010 ELECTROCARDIOGRAM REPORT: CPT | Mod: ,,, | Performed by: INTERNAL MEDICINE

## 2024-04-25 PROCEDURE — 93005 ELECTROCARDIOGRAM TRACING: CPT

## 2024-04-25 PROCEDURE — 1160F RVW MEDS BY RX/DR IN RCRD: CPT | Mod: CPTII,95,, | Performed by: INTERNAL MEDICINE

## 2024-04-25 NOTE — PROGRESS NOTES
The patient location is: [place]  The chief complaint leading to consultation is: Shortness of Breath    Visit type: Virtual visit with synchronous audio and video  Contact time spent with patient: 22 minutes  Each patient to whom he or she provides medical services by telemedicine is:  (1) informed of the relationship between the physician and patient and the respective role of any other health care provider with respect to management of the patient; and (2) notified that he or she may decline to receive medical services by telemedicine and may withdraw from such care at any time.  Subjective:       Patient ID: Ann-Marie Saavedra is a 43 y.o. female who  has a past medical history of Abnormal Pap smear (2013), Keloid cicatrix, and Wound dehiscence in puerperium, perineal, delivered/postpartum (9/25/2015).    Chief Complaint: Shortness of Breath     History was obtained from the patient and supplemented through chart review  She is a patient of Elsi Klein MD.  Was last seen  for annual.    Shortness of Breath  This is a chronic problem. The current episode started more than 1 year ago. The problem occurs intermittently. The problem has been unchanged. The average episode lasts 1 minutes. Associated symptoms include chest pain. Pertinent negatives include no abdominal pain, claudication, coryza, ear pain, fever, headaches, hemoptysis, leg pain, leg swelling, neck pain, orthopnea, PND, rash, rhinorrhea, sore throat, sputum production, swollen glands, syncope, vomiting or wheezing. The symptoms are aggravated by emotional upset and exercise. The patient has no known risk factors for DVT/PE. She has tried nothing for the symptoms. The treatment provided no relief. There is no history of allergies, aspirin allergies, asthma, bronchiolitis, CAD, chronic lung disease, COPD, DVT, a heart failure, PE, pneumonia or a recent surgery.     ALFRED:   USOH:  She has been in good health.  No major health issues.  She is very  "active.  Has run marathons and plays tennis.    Chronic ALFRED >1 year.  ALFRED occurs every time she is active.  Does not occur at rest.  When she runs, will feel ALFRED after <1 minute.  She will slow down and catch her breath. Hard to catch her breath and recover.  Lasts 30 sec - 1 minute.    Decreased intensity of her exercise. Can't play singles tennis or run anymore.     Associated with palpitations.    Also sometimes associated with shooting, stabbing pain of the left chest and right upper back, right shoulder.  This has been occurring more frequently now.  No chest wall TTP.  Resolves with rest.  She recently played a doubles tennis tournament and had to push through despite the chest pain and ALFRED.      No N/V, diaphoresis, lightheadedness, dizziness.  Some radiation slightly up her neck.  No aggravating or alleviating factors, such as change of position.    Has never occurred before.      The patient denies GERD symptoms.     Not pleuritic.  No leg edema, leg pain, but her muscles can feel tired.  No recent long distance travel, surgery or immobility. No h/o clotting disorders.    H/o menorrhagia, dysmenorrhea.  Had IUD placed in 1/2024.  Her menstrual cycles are much lighter now.    Does not use tobacco.  Denies illicit drug use.      Does not have a family history of early cardiac disease.    No h/o asthma, AR.    She denies constipation, diarrhea, hair loss, skin changes.    +cold intolerance.  Unintentional weight loss 4-5 lbs.  No change in diet.    No results found for: "TSH", "W1BXQZV", "U2QMWGX", "THYROIDAB", "FREET4"    BMI Readings from Last 3 Encounters:   01/30/24 20.39 kg/m²   09/06/23 21.31 kg/m²   07/06/23 21.40 kg/m²     Wt Readings from Last 3 Encounters:   01/30/24 1010 57.3 kg (126 lb 5.2 oz)   09/06/23 0907 59.9 kg (132 lb)   07/06/23 0809 60.2 kg (132 lb 9.7 oz)     H/o normocytic anemia during pregnancies in 2015, 2017. Resolved on repeat labs.      No results found for: "UIBC", "IRON", " ""TRANS", "TRANSFERRIN", "TIBC", "LABIRON", "FESATURATED"   No results found for: "FERRITIN"  No results found for: "[id number]"  No results found for: "FOLATE"              Not addressed today.  Rosacea, atypical nevus:    S/p biopsies.  Her sister is a dermatologist in [place], so performs TBSE.    Has seen [facility] before and Rx p.o. doxycycline, finacea.  Has TBSE with Dermatology.     Exercise: Hour Blast. 4/week, 60 minutes .    Diet: Eats steak 2-3/week, 3oz. Not much fish. Mostly chicken, rice, broccoli. Eats avocados.   FLP controlled.    ETOH: 4 drinks/week, 1-2 drinks at a time. No binge drinking.  Doing well. Doing well with exercise. Discussed well balanced diet.     Review of Systems   Constitutional:  Negative for fever.   HENT:  Negative for ear pain, rhinorrhea and sore throat.    Respiratory:  Positive for shortness of breath. Negative for hemoptysis, sputum production and wheezing.    Cardiovascular:  Positive for chest pain. Negative for orthopnea, claudication, leg swelling, syncope and PND.   Gastrointestinal:  Negative for abdominal pain and vomiting.   Musculoskeletal:  Negative for neck pain.   Skin:  Negative for rash.   Neurological:  Negative for headaches.       I personally reviewed Past Medical History, Past Surgical History, Social History, and Family History.    Objective:      There were no vitals filed for this visit.     Physical Exam  Constitutional:       General: She is not in acute distress.     Appearance: She is well-developed. She is not ill-appearing or diaphoretic.   HENT:      Head: Normocephalic.   Eyes:      General: No scleral icterus.        Right eye: No discharge.         Left eye: No discharge.   Pulmonary:      Effort: Pulmonary effort is normal. No respiratory distress.   Chest:      Chest wall: No tenderness.   Skin:     Coloration: Skin is not pale.      Findings: No erythema.   Neurological:      Mental Status: She is alert.   Psychiatric:         " Behavior: Behavior normal.         Lab Results   Component Value Date    WBC 6.08 03/24/2022    HGB 14.0 03/24/2022    HCT 42.4 03/24/2022     03/24/2022    CHOL 183 03/24/2022    TRIG 55 03/24/2022    HDL 75 03/24/2022    ALT 17 03/24/2022    AST 21 03/24/2022     03/24/2022    K 4.0 03/24/2022     03/24/2022    CREATININE 0.8 03/24/2022    BUN 12 03/24/2022    CO2 20 (L) 03/24/2022    HGBA1C 5.0 03/24/2022       The 10-year ASCVD risk score (Will FINNEGAN, et al., 2019) is: 0.3%    Values used to calculate the score:      Age: 43 years      Sex: Female      Is Non- : No      Diabetic: No      Tobacco smoker: No      Systolic Blood Pressure: 122 mmHg      Is BP treated: No      HDL Cholesterol: 75 mg/dL      Total Cholesterol: 183 mg/dL    Assessment:       1. ALFRED (dyspnea on exertion)    2. Chest pain, unspecified type    3. Annual physical exam        Plan:       Ann-Marie was seen today for shortness of breath.    Diagnoses and all orders for this visit:    ALFRED (dyspnea on exertion)  Comments:  No h/o asthma. Repeat CBC, iron panel (h/o anemia, menorrhagia). Check lytes, TSH (wt loss). Check D-dimer; if +, discussed need for urgent CTA to r/o PE.  Orders:  -     Comprehensive Metabolic Panel; Future  -     CBC Auto Differential; Future  -     Ferritin; Future  -     Iron and TIBC; Future  -     D-DIMER, QUANTITATIVE; Future  -     EKG 12-lead; Future  -     Stress Echo Which stress agent will be used? Treadmill Exercise; Color Flow Doppler? No; Future  -     Ambulatory referral/consult to Cardiology; Future  -     TSH; Future    Chest pain, unspecified type  Comments:  Workup as above.  Also ordering EKG, stress echo.  See Cardiology after workup.  Orders:  -     Comprehensive Metabolic Panel; Future  -     CBC Auto Differential; Future  -     Ferritin; Future  -     Iron and TIBC; Future  -     D-DIMER, QUANTITATIVE; Future  -     EKG 12-lead; Future  -     Stress Echo Which  stress agent will be used? Treadmill Exercise; Color Flow Doppler? No; Future  -     Ambulatory referral/consult to Cardiology; Future  -     TSH; Future    Annual physical exam  -     Comprehensive Metabolic Panel; Future  -     CBC Auto Differential; Future  -     Ferritin; Future  -     Iron and TIBC; Future  -     D-DIMER, QUANTITATIVE; Future  -     TSH; Future         Side effects of medication(s) were discussed in detail and patient voiced understanding.  Patient will call back for any issues or complications.     I have spent a total of 41 minutes with the patient as well as reviewing the chart/medical record and placing orders on the day of the visit. Discussed chest pain, ALFRED.     RTC in 3 month(s) or sooner PRN for annual with PCP.

## 2024-04-29 ENCOUNTER — HOSPITAL ENCOUNTER (OUTPATIENT)
Dept: RADIOLOGY | Facility: HOSPITAL | Age: 44
Discharge: HOME | End: 2024-04-29
Attending: OBSTETRICS & GYNECOLOGY
Payer: COMMERCIAL

## 2024-04-29 DIAGNOSIS — Z12.31 ENCOUNTER FOR SCREENING MAMMOGRAM FOR MALIGNANT NEOPLASM OF BREAST: ICD-10-CM

## 2024-04-29 PROCEDURE — 77063 BREAST TOMOSYNTHESIS BI: CPT | Mod: TC,PO

## 2024-04-29 PROCEDURE — 77067 SCR MAMMO BI INCL CAD: CPT | Mod: 26,,, | Performed by: RADIOLOGY

## 2024-04-29 PROCEDURE — 77063 BREAST TOMOSYNTHESIS BI: CPT | Mod: 26,,, | Performed by: RADIOLOGY

## 2024-05-01 ENCOUNTER — TELEPHONE (OUTPATIENT)
Dept: RADIOLOGY | Facility: HOSPITAL | Age: 44
End: 2024-05-01
Payer: COMMERCIAL

## 2024-05-01 NOTE — TELEPHONE ENCOUNTER
Spoke with patient and explained mammogram findings.Patient expressed understanding of results. Patient scheduled abnormal mammogram follow up appointment at [facility] [place] on 5/8/2024.

## 2024-05-02 ENCOUNTER — OFFICE VISIT (OUTPATIENT)
Dept: CARDIOLOGY | Facility: CLINIC | Age: 44
End: 2024-05-02
Payer: COMMERCIAL

## 2024-05-02 VITALS
BODY MASS INDEX: 20.15 KG/M2 | WEIGHT: 125.38 LBS | DIASTOLIC BLOOD PRESSURE: 71 MMHG | SYSTOLIC BLOOD PRESSURE: 112 MMHG | HEART RATE: 63 BPM | OXYGEN SATURATION: 96 % | HEIGHT: 66 IN

## 2024-05-02 DIAGNOSIS — R07.9 CHEST PAIN, UNSPECIFIED TYPE: ICD-10-CM

## 2024-05-02 DIAGNOSIS — R06.09 DOE (DYSPNEA ON EXERTION): ICD-10-CM

## 2024-05-02 PROCEDURE — 99999 PR PBB SHADOW E&M-EST. PATIENT-LVL III: CPT | Mod: PBBFAC,,, | Performed by: INTERNAL MEDICINE

## 2024-05-02 PROCEDURE — 3008F BODY MASS INDEX DOCD: CPT | Mod: CPTII,S$GLB,, | Performed by: INTERNAL MEDICINE

## 2024-05-02 PROCEDURE — 1160F RVW MEDS BY RX/DR IN RCRD: CPT | Mod: CPTII,S$GLB,, | Performed by: INTERNAL MEDICINE

## 2024-05-02 PROCEDURE — 1159F MED LIST DOCD IN RCRD: CPT | Mod: CPTII,S$GLB,, | Performed by: INTERNAL MEDICINE

## 2024-05-02 PROCEDURE — 3078F DIAST BP <80 MM HG: CPT | Mod: CPTII,S$GLB,, | Performed by: INTERNAL MEDICINE

## 2024-05-02 PROCEDURE — 99204 OFFICE O/P NEW MOD 45 MIN: CPT | Mod: S$GLB,,, | Performed by: INTERNAL MEDICINE

## 2024-05-02 PROCEDURE — 3074F SYST BP LT 130 MM HG: CPT | Mod: CPTII,S$GLB,, | Performed by: INTERNAL MEDICINE

## 2024-05-02 NOTE — PROGRESS NOTES
[facility]    Chief Complaint  Chief Complaint   Patient presents with    Shortness of Breath       HPI:    Patient presents for evaluation of dyspnea.  Has been present for over a year.  Occurs with exertion.  Resolved quickly with rest.  No change over the past year.  Sometimes associated with discomfort across her right precordium, shoulder, and neck.  Discomfort is stabbing.    Medications  Current Outpatient Medications   Medication Sig Dispense Refill    multivitamin (THERAGRAN) per tablet Take 1 tablet by mouth once daily.      SPIKEVAX [id number],12Y UP,,PF, 50 mcg/0.5 mL injection        No current facility-administered medications for this visit.        History  Past Medical History:   Diagnosis Date    Abnormal Pap smear 2013    no colpo    Keloid cicatrix     under right breast    Wound dehiscence in puerperium, perineal, delivered/postpartum 9/25/2015     Past Surgical History:   Procedure Laterality Date    CYST REMOVAL Right 2009    chest wall    FOOT TENDON SURGERY Bilateral 2008    bunion     Social History     Socioeconomic History    Marital status:    Occupational History    Occupation:      Employer: OTHER   Tobacco Use    Smoking status: Never    Smokeless tobacco: Never   Substance and Sexual Activity    Alcohol use: Yes     Alcohol/week: 5.0 standard drinks of alcohol     Types: 5 Glasses of wine per week    Drug use: No    Sexual activity: Yes     Partners: Male     Birth control/protection: None     Social Determinants of Health     Financial Resource Strain: Low Risk  (4/25/2024)    Overall Financial Resource Strain (CARDIA)     Difficulty of Paying Living Expenses: Not hard at all   Food Insecurity: No Food Insecurity (4/25/2024)    Hunger Vital Sign     Worried About Running Out of Food in the Last Year: Never true     Ran Out of Food in the Last Year: Never true   Transportation Needs: No Transportation Needs (4/25/2024)    PRAPARE - Transportation      Lack of Transportation (Medical): No     Lack of Transportation (Non-Medical): No   Physical Activity: Sufficiently Active (4/25/2024)    Exercise Vital Sign     Days of Exercise per Week: 4 days     Minutes of Exercise per Session: 40 min   Stress: Stress Concern Present (4/25/2024)    [employer] - Occupational Stress Questionnaire     Feeling of Stress : To some extent   Housing Stability: Low Risk  (7/5/2023)    Housing Stability Vital Sign     Unable to Pay for Housing in the Last Year: No     Number of Places Lived in the Last Year: 1     Unstable Housing in the Last Year: No     Family History   Problem Relation Name Age of Onset    Hyperlipidemia Mother      No Known Problems Father      Eczema Sister      No Known Problems Sister      No Known Problems Brother      Lung cancer Maternal Grandmother Polly Reeves     Colon cancer Maternal Grandfather CONSTANZA Reeves     Colon cancer Paternal Grandfather Hugo Skelton    Prostate cancer Paternal Uncle Shady Glez     Melanoma Neg Hx      Acne Neg Hx      Lupus Neg Hx      Psoriasis Neg Hx      Breast cancer Neg Hx      Ovarian cancer Neg Hx      Heart attack Neg Hx      Stroke Neg Hx      Diabetes Neg Hx      Osteoporosis Neg Hx          Allergies  Review of patient's allergies indicates:  No Known Allergies    Review of Systems   Review of Systems   Constitutional: Negative for malaise/fatigue, weight gain and weight loss.   Eyes:  Negative for visual disturbance.   Cardiovascular:  Positive for chest pain and dyspnea on exertion. Negative for claudication, cyanosis, irregular heartbeat, leg swelling, near-syncope, orthopnea, palpitations, paroxysmal nocturnal dyspnea and syncope.   Respiratory:  Positive for shortness of breath. Negative for cough, hemoptysis, sleep disturbances due to breathing and wheezing.    Hematologic/Lymphatic: Negative for bleeding problem. Does not bruise/bleed easily.   Skin:  Negative for poor  wound healing.   Musculoskeletal:  Negative for muscle cramps and myalgias.   Gastrointestinal:  Negative for abdominal pain, anorexia, diarrhea, heartburn, hematemesis, hematochezia, melena, nausea and vomiting.   Genitourinary:  Negative for hematuria and nocturia.   Neurological:  Negative for excessive daytime sleepiness, dizziness, focal weakness, light-headedness and weakness.       Physical Exam  Vitals:    05/02/24 0923   BP: 112/71   Pulse: 63     Wt Readings from Last 1 Encounters:   05/02/24 56.9 kg (125 lb 6.4 oz)     Physical Exam  Vitals and nursing note reviewed.   Constitutional:       General: She is not in acute distress.     Appearance: She is not toxic-appearing or diaphoretic.   HENT:      Head: Normocephalic and atraumatic.      Mouth/Throat:      Lips: Pink.      Mouth: Mucous membranes are moist.   Eyes:      General: No scleral icterus.     Conjunctiva/sclera: Conjunctivae normal.   Neck:      Thyroid: No thyromegaly.      Vascular: No carotid bruit, hepatojugular reflux or JVD.      Trachea: Trachea normal.   Cardiovascular:      Rate and Rhythm: Normal rate and regular rhythm. No extrasystoles are present.     Chest Wall: PMI is not displaced.      Pulses:           Carotid pulses are 2+ on the right side and 2+ on the left side.       Radial pulses are 2+ on the right side and 2+ on the left side.        Dorsalis pedis pulses are 2+ on the right side and 2+ on the left side.        Posterior tibial pulses are 2+ on the right side and 2+ on the left side.      Heart sounds: S1 normal and S2 normal. No murmur heard.     No friction rub. No S3 or S4 sounds.   Pulmonary:      Effort: Pulmonary effort is normal. No accessory muscle usage or respiratory distress.      Breath sounds: Normal breath sounds and air entry. No decreased breath sounds, wheezing, rhonchi or rales.   Abdominal:      General: Bowel sounds are normal. There is no distension or abdominal bruit.      Palpations: Abdomen is  soft. There is no hepatomegaly, splenomegaly or pulsatile mass.      Tenderness: There is no abdominal tenderness.   Musculoskeletal:         General: No tenderness or deformity.      Right lower leg: No edema.      Left lower leg: No edema.   Skin:     General: Skin is warm and dry.      Capillary Refill: Capillary refill takes less than 2 seconds.      Coloration: Skin is not cyanotic or pale.      Nails: There is no clubbing.   Neurological:      General: No focal deficit present.      Mental Status: She is alert and oriented to person, place, and time.   Psychiatric:         Attention and Perception: Attention normal.         Mood and Affect: Mood normal.         Speech: Speech normal.         Behavior: Behavior normal. Behavior is cooperative.         Labs  Hospital Outpatient Visit on 04/25/2024   Component Date Value Ref Range Status    QRS Duration 04/25/2024 88  ms Final    [facility] QTC Calculation 04/25/2024 420  ms Final   Lab Visit on 04/25/2024   Component Date Value Ref Range Status    Sodium 04/25/2024 141  136 - 145 mmol/L Final    Potassium 04/25/2024 4.3  3.5 - 5.1 mmol/L Final    Chloride 04/25/2024 109  95 - 110 mmol/L Final    CO2 04/25/2024 24  23 - 29 mmol/L Final    Glucose 04/25/2024 78  70 - 110 mg/dL Final    BUN 04/25/2024 14  6 - 20 mg/dL Final    Creatinine 04/25/2024 0.8  0.5 - 1.4 mg/dL Final    Calcium 04/25/2024 9.4  8.7 - 10.5 mg/dL Final    Total Protein 04/25/2024 7.6  6.0 - 8.4 g/dL Final    Albumin 04/25/2024 4.6  3.5 - 5.2 g/dL Final    Total Bilirubin 04/25/2024 0.8  0.1 - 1.0 mg/dL Final    Comment: For infants and newborns, interpretation of results should be based  on gestational age, weight and in agreement with clinical  observations.    Premature Infant recommended reference ranges:  Up to 24 hours.............<8.0 mg/dL  Up to 48 hours............<12.0 mg/dL  3-5 days..................<15.0 mg/dL  6-29 days.................<15.0 mg/dL      Alkaline Phosphatase 04/25/2024 55   55 - 135 U/L Final    AST 04/25/2024 22  10 - 40 U/L Final    ALT 04/25/2024 15  10 - 44 U/L Final    eGFR 04/25/2024 >60  >60 mL/min/1.73 m^2 Final    Anion Gap 04/25/2024 8  8 - 16 mmol/L Final    WBC 04/25/2024 4.68  3.90 - 12.70 K/uL Final    RBC 04/25/2024 5.00  4.00 - 5.40 M/uL Final    Hemoglobin 04/25/2024 14.9  12.0 - 16.0 g/dL Final    Hematocrit 04/25/2024 46.0  37.0 - 48.5 % Final    MCV 04/25/2024 92  82 - 98 fL Final    MCH 04/25/2024 29.8  27.0 - 31.0 pg Final    MCHC 04/25/2024 32.4  32.0 - 36.0 g/dL Final    RDW 04/25/2024 12.4  11.5 - 14.5 % Final    Platelets 04/25/2024 233  150 - 450 K/uL Final    MPV 04/25/2024 9.7  9.2 - 12.9 fL Final    Immature Granulocytes 04/25/2024 0.9 (H)  0.0 - 0.5 % Final    Gran # (ANC) 04/25/2024 2.8  1.8 - 7.7 K/uL Final    Immature Grans (Abs) 04/25/2024 0.04  0.00 - 0.04 K/uL Final    Comment: Mild elevation in immature granulocytes is non specific and   can be seen in a variety of conditions including stress response,   acute inflammation, trauma and pregnancy. Correlation with other   laboratory and clinical findings is essential.      Lymph # 04/25/2024 1.2  1.0 - 4.8 K/uL Final    Mono # 04/25/2024 0.4  0.3 - 1.0 K/uL Final    Eos # 04/25/2024 0.1  0.0 - 0.5 K/uL Final    Baso # 04/25/2024 0.03  0.00 - 0.20 K/uL Final    nRBC 04/25/2024 0  0 /100 WBC Final    Gran % 04/25/2024 60.0  38.0 - 73.0 % Final    Lymph % 04/25/2024 26.5  18.0 - 48.0 % Final    Mono % 04/25/2024 9.2  4.0 - 15.0 % Final    Eosinophil % 04/25/2024 2.8  0.0 - 8.0 % Final    Basophil % 04/25/2024 0.6  0.0 - 1.9 % Final    Differential Method 04/25/2024 Automated   Final    Ferritin 04/25/2024 125  20.0 - 300.0 ng/mL Final    Iron 04/25/2024 129  30 - 160 ug/dL Final    Transferrin 04/25/2024 269  200 - 375 mg/dL Final    TIBC 04/25/2024 398  250 - 450 ug/dL Final    Saturated Iron 04/25/2024 32  20 - 50 % Final    D-Dimer 04/25/2024 0.24  <0.50 mg/L FEU Final    Comment: The quantitative  D-dimer assay should be used as an aid in   the diagnosis of deep vein thrombosis and pulmonary embolism  in patients with the appropriate presentation and clinical  history. The upper limit of the reference interval and the clinical   cut off   point are identical. Causes of a positive (>0.50 mg/L FEU) D-Dimer   test  include, but are not limited to: DVT, PE, DIC, thrombolytic   therapy, anticoagulant therapy, recent surgery, trauma, or   pregnancy, disseminated malignancy, aortic aneurysm, cirrhosis,  and severe infection. False negative results may occur in   patients with distal DVT.  NYDIA^[id number]^^[id number]^  LOT^[id number]^[id number]      TSH 04/25/2024 1.546  0.400 - 4.000 uIU/mL Final   Office Visit on 01/30/2024   Component Date Value Ref Range Status    Final Pathologic Diagnosis 01/30/2024  (A)   Final                    Value:Specimen Adequacy  Unsatisfactory for evaluation.    [place] Category  Unsatisfactory for evaluation. Specimen processed and examined, but unsatisfactory for evaluation of epithelial abnormality because of scant squamous component.  Menstrual smear.      Interp By Barbra Adams M.D., Signed on 02/06/2024 at 11:19    Disclaimer 01/30/2024  (A)   Final                    Value:The Pap smear is a screening test that aids in the detection of cervical cancer and cancer precursors. Both false positive and false negative results can occur. The test should be used at regular intervals, and positive results should be confirmed before   definitive therapy.  This liquid based specimen is processed using the  or  Thin PrepPAP System. This specimen has been analyzed by the ThinPrep Imaging System ([employer]), an automated imaging and review system which assists the laboratory in evaluating   cells on ThinPrep PAP tests. Following automated imaging, selected fields from every slide are reviewed by a cytotechnologist and/or pathologist.     Screening was  performed at [facility], [address] [place] [address].      HPV other High Risk types, PCR 01/30/2024 Negative  Negative Final    Comment: Other HPV genotypes include:   31,33,35,39,45,51,52,56,58,59,66 and 68.      HPV High Risk type 16, PCR 01/30/2024 Negative  Negative Final    HPV High Risk type 18, PCR 01/30/2024 Negative  Negative Final       Imaging  Mammo Digital Screening Bilat w/ Diaz    Result Date: 5/1/2024  Result: Mammo Digital Screening Bilat w/ Diaz  History: Patient is 43 y.o. and is seen for a screening mammogram. Films Compared: Compared to: 04/18/2023 Mammo Digital Screening Bilat w/ Diaz  Findings: This procedure was performed using tomosynthesis. Computer-aided detection was utilized in the interpretation of this examination. The breasts are extremely dense, which lowers the sensitivity of mammography. Right There is an asymmetry seen in the right breast in the middle depth on the CC view. Left There is no evidence of suspicious masses, calcifications, or other abnormal findings in the left breast.     Right Asymmetry: Right breast asymmetry at the middle position. Assessment: 0 - Incomplete. Diagnostic Mammogram and/or Ultrasound is recommended. Left There is no mammographic evidence of malignancy in the left breast. BI-RADS Category: Overall: 0 - Incomplete: Needs Additional Imaging Evaluation  Recommendation: Diagnostic mammogram with possible ultrasound (if indicated) is recommended. Your estimated lifetime risk of breast cancer (to age 85) based on Tyrer-Cuzick risk assessment model is Tyrer-Cuzick: 14.98%. According to the American Cancer Society, patients with a lifetime breast cancer risk of 20% or higher might benefit from supplemental screening tests.       Assessment  1. ALFRED (dyspnea on exertion)  Low risk for cardiac etiology  - Ambulatory referral/consult to Cardiology    2. Chest pain, unspecified type  More likely  musculoskeletal  - Ambulatory referral/consult to Cardiology      Plan and Discussion    A stress test had previously been ordered.  She declines because of co-pay.  Seems reasonable since she is at low risk for obstructive coronary artery disease.  Would consider an echocardiogram to assess for structural heart disease although by exam and EKG, not evident.  She does not wish to pursue cardiac testing at this time.    The 10-year ASCVD risk score (Will FINNEGAN, et al., 2019) is: 0.2%    Values used to calculate the score:      Age: 43 years      Sex: Female      Is Non- : No      Diabetic: No      Tobacco smoker: No      Systolic Blood Pressure: 112 mmHg      Is BP treated: No      HDL Cholesterol: 75 mg/dL      Total Cholesterol: 183 mg/dL     Follow Up  Follow up if symptoms worsen or fail to improve.      Casa Miller MD

## 2024-05-08 ENCOUNTER — HOSPITAL ENCOUNTER (OUTPATIENT)
Dept: RADIOLOGY | Facility: HOSPITAL | Age: 44
Discharge: HOME | End: 2024-05-08
Attending: OBSTETRICS & GYNECOLOGY
Payer: COMMERCIAL

## 2024-05-08 DIAGNOSIS — R92.8 ABNORMAL FINDING ON BREAST IMAGING: ICD-10-CM

## 2024-05-08 PROCEDURE — 77061 BREAST TOMOSYNTHESIS UNI: CPT | Mod: TC,RT

## 2024-05-08 PROCEDURE — 77065 DX MAMMO INCL CAD UNI: CPT | Mod: TC,RT

## 2024-05-08 PROCEDURE — 77061 BREAST TOMOSYNTHESIS UNI: CPT | Mod: 26,RT,, | Performed by: RADIOLOGY

## 2024-05-08 PROCEDURE — 77065 DX MAMMO INCL CAD UNI: CPT | Mod: 26,RT,, | Performed by: RADIOLOGY

## 2024-11-27 ENCOUNTER — OFFICE VISIT (OUTPATIENT)
Dept: INTERNAL MEDICINE | Facility: CLINIC | Age: 44
End: 2024-11-27
Payer: COMMERCIAL

## 2024-11-27 VITALS
DIASTOLIC BLOOD PRESSURE: 60 MMHG | SYSTOLIC BLOOD PRESSURE: 100 MMHG | HEIGHT: 66 IN | OXYGEN SATURATION: 96 % | HEART RATE: 76 BPM | BODY MASS INDEX: 19.7 KG/M2 | WEIGHT: 122.56 LBS

## 2024-11-27 DIAGNOSIS — R06.02 SHORTNESS OF BREATH: Primary | ICD-10-CM

## 2024-11-27 DIAGNOSIS — Z00.00 HEALTH MAINTENANCE EXAMINATION: ICD-10-CM

## 2024-11-27 DIAGNOSIS — Z23 NEED FOR VACCINATION: ICD-10-CM

## 2024-11-27 DIAGNOSIS — Z91.09 ENVIRONMENTAL ALLERGIES: ICD-10-CM

## 2024-11-27 PROCEDURE — 3078F DIAST BP <80 MM HG: CPT | Mod: CPTII,S$GLB,, | Performed by: STUDENT IN AN ORGANIZED HEALTH CARE EDUCATION/TRAINING PROGRAM

## 2024-11-27 PROCEDURE — 1160F RVW MEDS BY RX/DR IN RCRD: CPT | Mod: CPTII,S$GLB,, | Performed by: STUDENT IN AN ORGANIZED HEALTH CARE EDUCATION/TRAINING PROGRAM

## 2024-11-27 PROCEDURE — 3074F SYST BP LT 130 MM HG: CPT | Mod: CPTII,S$GLB,, | Performed by: STUDENT IN AN ORGANIZED HEALTH CARE EDUCATION/TRAINING PROGRAM

## 2024-11-27 PROCEDURE — 90471 IMMUNIZATION ADMIN: CPT | Mod: 59,S$GLB,, | Performed by: STUDENT IN AN ORGANIZED HEALTH CARE EDUCATION/TRAINING PROGRAM

## 2024-11-27 PROCEDURE — 94640 AIRWAY INHALATION TREATMENT: CPT | Mod: S$GLB,,, | Performed by: STUDENT IN AN ORGANIZED HEALTH CARE EDUCATION/TRAINING PROGRAM

## 2024-11-27 PROCEDURE — 3008F BODY MASS INDEX DOCD: CPT | Mod: CPTII,S$GLB,, | Performed by: STUDENT IN AN ORGANIZED HEALTH CARE EDUCATION/TRAINING PROGRAM

## 2024-11-27 PROCEDURE — 1159F MED LIST DOCD IN RCRD: CPT | Mod: CPTII,S$GLB,, | Performed by: STUDENT IN AN ORGANIZED HEALTH CARE EDUCATION/TRAINING PROGRAM

## 2024-11-27 PROCEDURE — 90656 IIV3 VACC NO PRSV 0.5 ML IM: CPT | Mod: S$GLB,,, | Performed by: STUDENT IN AN ORGANIZED HEALTH CARE EDUCATION/TRAINING PROGRAM

## 2024-11-27 PROCEDURE — 99214 OFFICE O/P EST MOD 30 MIN: CPT | Mod: 25,S$GLB,, | Performed by: STUDENT IN AN ORGANIZED HEALTH CARE EDUCATION/TRAINING PROGRAM

## 2024-11-27 PROCEDURE — 99999 PR PBB SHADOW E&M-EST. PATIENT-LVL III: CPT | Mod: PBBFAC,,, | Performed by: STUDENT IN AN ORGANIZED HEALTH CARE EDUCATION/TRAINING PROGRAM

## 2024-11-27 RX ORDER — AZELASTINE 1 MG/ML
2 SPRAY, METERED NASAL 2 TIMES DAILY
Qty: 30 ML | Refills: 2 | Status: SHIPPED | OUTPATIENT
Start: 2024-11-27 | End: 2025-11-27

## 2024-11-27 RX ORDER — LEVOCETIRIZINE DIHYDROCHLORIDE 5 MG/1
5 TABLET, FILM COATED ORAL NIGHTLY
Qty: 90 TABLET | Refills: 1 | Status: SHIPPED | OUTPATIENT
Start: 2024-11-27

## 2024-11-27 RX ORDER — IPRATROPIUM BROMIDE AND ALBUTEROL SULFATE 2.5; .5 MG/3ML; MG/3ML
3 SOLUTION RESPIRATORY (INHALATION)
Status: COMPLETED | OUTPATIENT
Start: 2024-11-27 | End: 2024-11-27

## 2024-11-27 RX ORDER — DOXYCYCLINE 40 MG/1
40 CAPSULE ORAL
COMMUNITY
Start: 2024-11-07

## 2024-11-27 RX ORDER — ALBUTEROL SULFATE 90 UG/1
2 INHALANT RESPIRATORY (INHALATION) EVERY 6 HOURS PRN
Qty: 18 G | Refills: 2 | Status: SHIPPED | OUTPATIENT
Start: 2024-11-27

## 2024-11-27 RX ADMIN — IPRATROPIUM BROMIDE AND ALBUTEROL SULFATE 3 ML: 2.5; .5 SOLUTION RESPIRATORY (INHALATION) at 09:11

## 2024-11-27 NOTE — PROGRESS NOTES
Duoneb nebulizer treatment administer per Dr. Klein. Pt's SPO2 is 97 on RA at beginning of treatment. At end of treatment pt's SPO2 is 98 on RA.     After obtaining consent, and per orders of Dr. Klein, injection of Fluarix Lot [id number] 06/30/25 given in the LD by Nneka. Patient tolerated well and band aid applied. Patient instructed to remain in clinic for 15 minutes afterwards, and to report any adverse reaction to me immediately.

## 2024-11-27 NOTE — PROGRESS NOTES
Subjective:       Patient ID: Ann-Marie Saavedra is a 44 y.o. female.    Chief Complaint: Shortness of breath [R06.02]    Patient is established with me, here today for the following:    History of Present Illness      RESPIRATORY SYMPTOMS:  She reports progressively worsening shortness of breath over the years, occurring during various activities such as exercise, walking up stairs, and household tasks. Her difficulty breathing initially was only limited to exercise, which she attributed to deconditioning, but has since worsened and now occurs with minimal exertion. She needs to take breaks during these activities and experiences associated central, sharp chest pain. The shortness of breath improves quickly with rest. She has difficulty having conversations while walking. She also experiences a wheezing cough, which worsens significantly during periods of illness, particularly with colds. She denies fever or chills associated with these symptoms. She reports upper respiratory congestion, including sinus pressure and chronic post-nasal drip.    EXERCISE TOLERANCE:  She has a history of swimming competitively in college. Currently, she experiences difficulty with physical activities, particularly noting an inability to play singles tennis as she used to. She has downgraded to playing doubles tennis due to these limitations.    INCIDENT OF SEVERE SYMPTOMS:  At the end of summer, after playing a tennis match in hot weather and lifting weights, she experienced severe symptoms the next morning. She was unable to move, with severe shortness of breath and an elevated heart rate even at rest. She had to lay down and was unable to perform any physical activity for two weeks, experiencing significant chest pain and persistent tachycardia.    PAST MEDICAL HISTORY:  She denies any history of asthma or similar respiratory issues during childhood. She reports a prolonged cold in the fall that lasted for 2.5 months, accompanied  "by wheezing and cough.    SOCIAL HISTORY:  She denies any history of smoking.    PREVIOUS MEDICAL EVALUATIONS:  She saw a cardiologist in April. EKG showed sinus bradycardia. A stress test was ordered at that time, but she did not complete it due to insurance coverage.      ROS:  General: -fever, -chills  ENT: +nasal congestion  Cardiovascular: +chest pain  Respiratory: +cough, +shortness of breath, +wheezing          Health maintenance -   Family history of colorectal cancer.  Mammogram BI-RADS 1 in 2024.   Denies family history of breast cancer.  Denies family history of ovarian cancer.  Last pap performed .   Denies history of abnormal pap smear.  Denies family history of osteoporosis.  Denies significant family history of cardiac disease.  UTD on Tdap, COVID primary/booster, HPV vaccinations.  Due for COVID, influenza vaccinations.  Never smoker.  Denies drug use.  Completed HIV and hepatitis C screening.  Due for lipid screening.  Lab Results   Component Value Date    LDLCALC 97.0 2022   The 10-year ASCVD risk score (Will FINNEGAN, et al., 2019) is: 0.2%  Due for diabetes screening.  Lab Results   Component Value Date    HGBA1C 5.0 2022     Began menarche at age 16.   [id number]  Denies gestational diabetes and HTN.  Currently sexually active with .     Current Outpatient Medications   Medication Instructions    albuterol (VENTOLIN HFA) 90 mcg/actuation inhaler 2 puffs, Inhalation, Every 6 hours PRN, Rescue    azelastine (ASTELIN) 274 mcg, Nasal, 2 times daily    doxycycline (ORACEA) 40 mg    levocetirizine (XYZAL) 5 mg, Oral, Nightly    multivitamin (THERAGRAN) per tablet 1 tablet, Daily     Objective:      Vitals:    24 0828   BP: 100/60   Pulse: 76   SpO2: 96%   Weight: 55.6 kg (122 lb 9.2 oz)   Height: 5' 6" (1.676 m)   PainSc: 0-No pain     Body mass index is 19.78 kg/m².    Physical Exam  Vitals reviewed.   Constitutional:       General: She is not in acute distress.     " Appearance: Normal appearance. She is not ill-appearing or diaphoretic.   HENT:      Head: Normocephalic and atraumatic.      Right Ear: Tympanic membrane, ear canal and external ear normal. There is no impacted cerumen.      Left Ear: Tympanic membrane, ear canal and external ear normal. There is no impacted cerumen.      Nose: Congestion present. No rhinorrhea.      Right Nostril: No foreign body, epistaxis, septal hematoma or occlusion.      Left Nostril: No foreign body, epistaxis, septal hematoma or occlusion.      Right Turbinates: Swollen. Not pale.      Left Turbinates: Swollen. Not pale.      Mouth/Throat:      Mouth: Mucous membranes are moist.      Pharynx: Oropharynx is clear. Posterior oropharyngeal erythema (mild, posterior oropharynx) present. No pharyngeal swelling, oropharyngeal exudate or uvula swelling.   Eyes:      General: No scleral icterus.        Right eye: No discharge.         Left eye: No discharge.      Conjunctiva/sclera: Conjunctivae normal.   Neck:      Thyroid: No thyromegaly or thyroid tenderness.      Trachea: Trachea normal.   Cardiovascular:      Rate and Rhythm: Normal rate and regular rhythm.      Heart sounds: Normal heart sounds. No murmur heard.     No friction rub. No gallop.   Pulmonary:      Effort: Tachypnea present. No respiratory distress.      Breath sounds: No stridor. Examination of the right-lower field reveals decreased breath sounds. Examination of the left-lower field reveals decreased breath sounds. Decreased breath sounds present. No wheezing, rhonchi or rales.   Musculoskeletal:      Cervical back: Neck supple.   Lymphadenopathy:      Head:      Right side of head: No submandibular or posterior auricular adenopathy.      Left side of head: No submandibular or posterior auricular adenopathy.      Cervical: No cervical adenopathy.      Right cervical: No superficial, deep or posterior cervical adenopathy.     Left cervical: No superficial, deep or posterior  cervical adenopathy.      Upper Body:      Right upper body: No supraclavicular adenopathy.      Left upper body: No supraclavicular adenopathy.   Skin:     General: Skin is warm and dry.   Neurological:      Mental Status: She is alert. Mental status is at baseline.   Psychiatric:         Mood and Affect: Mood normal.         Behavior: Behavior normal.         Assessment:       1. Shortness of breath    2. Health maintenance examination    3. Need for vaccination    4. Environmental allergies        Plan:   Shortness of breath  -     X-Ray Chest PA And Lateral; Future; Expected date: 11/27/2024  -     Complete PFT w/ bronchodilator; Future  -     albuterol (VENTOLIN HFA) 90 mcg/actuation inhaler; Inhale 2 puffs into the lungs every 6 (six) hours as needed for Wheezing or Shortness of Breath. Rescue  Dispense: 18 g; Refill: 2  -     albuterol-ipratropium 2.5 mg-0.5 mg/3 mL nebulizer solution 3 mL    Health maintenance examination  -     Lipid Panel; Future; Expected date: 12/11/2024  -     Comprehensive Metabolic Panel; Future; Expected date: 11/27/2024  -     CBC Auto Differential; Future; Expected date: 12/11/2024  -     Hemoglobin A1C; Future; Expected date: 12/11/2024    Need for vaccination  -     influenza (Flulaval, Fluzone, Fluarix) 45 mcg/0.5 mL IM vaccine (> or = 6 mo) 0.5 mL    Environmental allergies  -     levocetirizine (XYZAL) 5 MG tablet; Take 1 tablet (5 mg total) by mouth every evening.  Dispense: 90 tablet; Refill: 1  -     azelastine (ASTELIN) 137 mcg (0.1 %) nasal spray; 2 sprays (274 mcg total) by Nasal route 2 (two) times daily.  Dispense: 30 mL; Refill: 2      Assessment & Plan    Considered asthma or reactive airway disease given progressive shortness of breath, wheezing, and cough exacerbated by physical activity and illness  Ordered chest XR to rule out structural abnormalities  Recommend pulmonary function testing (PFT) to assess lung function and potential reversibility with  bronchodilators  Performed in-office albuterol nebulizer treatment to evaluate immediate response and improvement in air movement  Noted upper airway congestion and swollen nasal turbinates, likely contributing to symptoms  Recent cardiology evaluation ruled out cardiac etiology    ASTHMA AND REACTIVE AIRWAY DISEASE:  - Explained reactive airway disease .  - Discussed how albuterol works to open airways and its potential side effects, including increased heart rate.  - Provided information on the purpose and process of pulmonary function testing.  - Ann-Marie to use albuterol inhaler 15-20 minutes before physical activity to prevent shortness of breath.  - Started albuterol inhaler, use every 4-6 hours as needed for shortness of breath, wheezing, or heavy coughing.  - Chest XR ordered.  - Pulmonary function test (PFT) ordered.  - In-office albuterol nebulizer treatment performed.    ALLERGIC RHINITIS AND NASAL SYMPTOMS:  - Explained the mechanism of antihistamines and nasal sprays in managing allergy symptoms and congestion.  - Ann-Marie to apply Azelastine nasal spray 2 times daily for 1 week, then as needed.  - Started OTC antihistamine tablet, take nightly before bed.    GENERAL HEALTH MANAGEMENT:  - Blood work ordered.  - Flu vaccine administered in office.    FOLLOW-UP:  - Follow up on January 9th at 1 p.m. for virtual appointment as scheduled.  - Contact the office for any worsening symptoms or concerns before the scheduled follow-up.         Post DuoNeb treatment in office, auscultation of lungs performed again with improved air movement in lower lobes.   Patient without significant improvement in symptoms post nebulizer.   SpO2 96 --> 98% post treatment.     Elsi Klein MD  11/27/2024

## 2024-12-02 ENCOUNTER — HOSPITAL ENCOUNTER (OUTPATIENT)
Dept: PULMONOLOGY | Facility: OTHER | Age: 44
Discharge: HOME | End: 2024-12-02
Attending: STUDENT IN AN ORGANIZED HEALTH CARE EDUCATION/TRAINING PROGRAM
Payer: COMMERCIAL

## 2024-12-02 ENCOUNTER — HOSPITAL ENCOUNTER (OUTPATIENT)
Dept: RADIOLOGY | Facility: OTHER | Age: 44
Discharge: HOME | End: 2024-12-02
Attending: STUDENT IN AN ORGANIZED HEALTH CARE EDUCATION/TRAINING PROGRAM
Payer: COMMERCIAL

## 2024-12-02 ENCOUNTER — PATIENT MESSAGE (OUTPATIENT)
Dept: INTERNAL MEDICINE | Facility: CLINIC | Age: 44
End: 2024-12-02
Payer: COMMERCIAL

## 2024-12-02 VITALS — HEART RATE: 88 BPM | RESPIRATION RATE: 16 BRPM

## 2024-12-02 DIAGNOSIS — R06.02 SHORTNESS OF BREATH: ICD-10-CM

## 2024-12-02 DIAGNOSIS — J93.9 PNEUMOTHORAX, UNSPECIFIED TYPE: ICD-10-CM

## 2024-12-02 DIAGNOSIS — R06.02 SHORTNESS OF BREATH: Primary | ICD-10-CM

## 2024-12-02 LAB
DLCO ADJ PRE: 7.94 ML/(MIN*MMHG)
DLCO SINGLE BREATH LLN: 20.24
DLCO SINGLE BREATH PRE REF: 30.6 %
DLCO SINGLE BREATH REF: 25.98
DLCOC SBVA LLN: 3.6
DLCOC SBVA PRE REF: 32.9 %
DLCOC SBVA REF: 5.09
DLCOC SINGLE BREATH LLN: 20.24
DLCOC SINGLE BREATH PRE REF: 30.6 %
DLCOC SINGLE BREATH REF: 25.98
DLCOCSBVAULN: 6.59
DLCOCSINGLEBREATHULN: 31.71
DLCOCSINGLEBREATHZSCORE: -5.17
DLCOSINGLEBREATHULN: 31.71
DLCOSINGLEBREATHZSCORE: -5.17
DLCOVA LLN: 3.6
DLCOVA PRE REF: 32.9 %
DLCOVA PRE: 1.68 ML/(MIN*MMHG*L)
DLCOVA REF: 5.09
DLCOVAULN: 6.59
DLVAADJ PRE: 1.68 ML/(MIN*MMHG*L)
ERVN2 LLN: -16448.92
ERVN2 PRE REF: 171.5 %
ERVN2 PRE: 1.85 L
ERVN2 REF: 1.08
ERVN2ULN: NORMAL
FEF 25 75 LLN: 2.16
FEF 25 75 PRE REF: 5.5 %
FEF 25 75 REF: 3.55
FET100 CHG: -19.4 %
FEV05 LLN: 1.29
FEV05 REF: 2.14
FEV1 CHG: 20.8 %
FEV1 FVC LLN: 71
FEV1 FVC PRE REF: 28.8 %
FEV1 FVC REF: 82
FEV1 LLN: 2.43
FEV1 PRE REF: 30.2 %
FEV1 REF: 3.06
FEV1 VOL CHG: 0.19
FEV1FVCZSCORE: -5.12
FEV1ZSCORE: -5.27
FRCN2 LLN: 1.92
FRCN2 PRE REF: 156.1 %
FRCN2 REF: 2.74
FRCN2ULN: 3.56
FVC CHG: 8.6 %
FVC LLN: 3
FVC PRE REF: 104.2 %
FVC REF: 3.77
FVC VOL CHG: 0.34
FVCZSCORE: 0.33
ICN2REF: 2.32
IVC PRE: 3.35 L
IVC SINGLE BREATH LLN: 3
IVC SINGLE BREATH PRE REF: 88.9 %
IVC SINGLE BREATH REF: 3.77
IVCSINGLEBREATHULN: 4.57
LLN IC N2: -16447.68
PEF LLN: 5.33
PEF PRE REF: 39.6 %
PEF REF: 7.09
PHYSICIAN COMMENT: NORMAL
POST FEF 25 75: 0.28 L/S
POST FET 100: 24.06 SEC
POST FEV1 FVC: 26.19 %
POST FEV1: 1.12 L
POST FEV5: 0.74 L
POST FVC: 4.26 L
POST PEF: 3.44 L/S
PRE DLCO: 7.94 ML/(MIN*MMHG)
PRE FEF 25 75: 0.2 L/S
PRE FET 100: 29.86 SEC
PRE FEV05 REF: 26.6 %
PRE FEV1 FVC: 23.55 %
PRE FEV1: 0.92 L
PRE FEV5: 0.57 L
PRE FRC N2: 4.27 L
PRE FVC: 3.92 L
PRE IC N2: 1.86 L
PRE PEF: 2.81 L/S
PRE REF IC N2: 80.2 %
RVN2 LLN: 1.08
RVN2 PRE REF: 133.3 %
RVN2 PRE: 2.21 L
RVN2 REF: 1.66
RVN2TLCN2 LLN: 23.65
RVN2TLCN2 PRE REF: 108.4 %
RVN2TLCN2 PRE: 36.04 %
RVN2TLCN2 REF: 33.24
RVN2TLCN2ULN: 42.83
RVN2ULN: 2.23
TLCN2 LLN: 4.11
TLCN2 PRE REF: 120.3 %
TLCN2 PRE: 6.13 L
TLCN2 REF: 5.1
TLCN2ULN: 6.09
TLCN2ZSCORE: 1.73
ULN IC N2: NORMAL
VA PRE: 4.74 L
VA SINGLE BREATH LLN: 4.95
VA SINGLE BREATH PRE REF: 95.8 %
VA SINGLE BREATH REF: 4.95
VASINGLEBREATHULN: 4.95
VCMAXN2 LLN: 3
VCMAXN2 PRE REF: 104.2 %
VCMAXN2 PRE: 3.92 L
VCMAXN2 REF: 3.77
VCMAXN2ULN: 4.57

## 2024-12-02 PROCEDURE — 94060 EVALUATION OF WHEEZING: CPT | Mod: 26,,, | Performed by: INTERNAL MEDICINE

## 2024-12-02 PROCEDURE — 94060 EVALUATION OF WHEEZING: CPT

## 2024-12-02 PROCEDURE — 94727 GAS DIL/WSHOT DETER LNG VOL: CPT | Mod: 26,,, | Performed by: INTERNAL MEDICINE

## 2024-12-02 PROCEDURE — 25000242 PHARM REV CODE 250 ALT 637 W/ HCPCS: Performed by: STUDENT IN AN ORGANIZED HEALTH CARE EDUCATION/TRAINING PROGRAM

## 2024-12-02 PROCEDURE — 94727 GAS DIL/WSHOT DETER LNG VOL: CPT

## 2024-12-02 PROCEDURE — 94729 DIFFUSING CAPACITY: CPT | Mod: 26,,, | Performed by: INTERNAL MEDICINE

## 2024-12-02 PROCEDURE — 71046 X-RAY EXAM CHEST 2 VIEWS: CPT | Mod: TC,FY

## 2024-12-02 PROCEDURE — 71046 X-RAY EXAM CHEST 2 VIEWS: CPT | Mod: 26,,, | Performed by: RADIOLOGY

## 2024-12-02 PROCEDURE — 94729 DIFFUSING CAPACITY: CPT

## 2024-12-02 RX ORDER — ALBUTEROL SULFATE 2.5 MG/.5ML
2.5 SOLUTION RESPIRATORY (INHALATION) ONCE
Status: COMPLETED | OUTPATIENT
Start: 2024-12-02 | End: 2024-12-02

## 2024-12-02 RX ADMIN — ALBUTEROL SULFATE 2.5 MG: 2.5 SOLUTION RESPIRATORY (INHALATION) at 02:12

## 2024-12-03 ENCOUNTER — TELEPHONE (OUTPATIENT)
Dept: INTERNAL MEDICINE | Facility: CLINIC | Age: 44
End: 2024-12-03
Payer: COMMERCIAL

## 2024-12-03 ENCOUNTER — OFFICE VISIT (OUTPATIENT)
Dept: PULMONOLOGY | Facility: CLINIC | Age: 44
End: 2024-12-03
Payer: COMMERCIAL

## 2024-12-03 ENCOUNTER — HOSPITAL ENCOUNTER (OUTPATIENT)
Dept: RADIOLOGY | Facility: HOSPITAL | Age: 44
Discharge: HOME | End: 2024-12-03
Attending: STUDENT IN AN ORGANIZED HEALTH CARE EDUCATION/TRAINING PROGRAM
Payer: COMMERCIAL

## 2024-12-03 VITALS
SYSTOLIC BLOOD PRESSURE: 109 MMHG | DIASTOLIC BLOOD PRESSURE: 68 MMHG | HEART RATE: 76 BPM | HEIGHT: 66 IN | OXYGEN SATURATION: 95 % | WEIGHT: 123.69 LBS | BODY MASS INDEX: 19.88 KG/M2

## 2024-12-03 DIAGNOSIS — J93.9 PNEUMOTHORAX, UNSPECIFIED TYPE: ICD-10-CM

## 2024-12-03 DIAGNOSIS — J93.12 SECONDARY SPONTANEOUS PNEUMOTHORAX: ICD-10-CM

## 2024-12-03 DIAGNOSIS — R06.02 SHORTNESS OF BREATH: ICD-10-CM

## 2024-12-03 DIAGNOSIS — J84.81 LYMPHANGIOLEIOMYOMATOSIS: Primary | ICD-10-CM

## 2024-12-03 PROCEDURE — 99204 OFFICE O/P NEW MOD 45 MIN: CPT | Mod: S$GLB,,, | Performed by: STUDENT IN AN ORGANIZED HEALTH CARE EDUCATION/TRAINING PROGRAM

## 2024-12-03 PROCEDURE — 3044F HG A1C LEVEL LT 7.0%: CPT | Mod: CPTII,S$GLB,, | Performed by: STUDENT IN AN ORGANIZED HEALTH CARE EDUCATION/TRAINING PROGRAM

## 2024-12-03 PROCEDURE — 3074F SYST BP LT 130 MM HG: CPT | Mod: CPTII,S$GLB,, | Performed by: STUDENT IN AN ORGANIZED HEALTH CARE EDUCATION/TRAINING PROGRAM

## 2024-12-03 PROCEDURE — 71250 CT THORAX DX C-: CPT | Mod: 26,,, | Performed by: RADIOLOGY

## 2024-12-03 PROCEDURE — 71250 CT THORAX DX C-: CPT | Mod: TC

## 2024-12-03 PROCEDURE — 99999 PR PBB SHADOW E&M-EST. PATIENT-LVL V: CPT | Mod: PBBFAC,,, | Performed by: STUDENT IN AN ORGANIZED HEALTH CARE EDUCATION/TRAINING PROGRAM

## 2024-12-03 PROCEDURE — 71046 X-RAY EXAM CHEST 2 VIEWS: CPT | Mod: TC,FY

## 2024-12-03 PROCEDURE — 1159F MED LIST DOCD IN RCRD: CPT | Mod: CPTII,S$GLB,, | Performed by: STUDENT IN AN ORGANIZED HEALTH CARE EDUCATION/TRAINING PROGRAM

## 2024-12-03 PROCEDURE — 3008F BODY MASS INDEX DOCD: CPT | Mod: CPTII,S$GLB,, | Performed by: STUDENT IN AN ORGANIZED HEALTH CARE EDUCATION/TRAINING PROGRAM

## 2024-12-03 PROCEDURE — 3078F DIAST BP <80 MM HG: CPT | Mod: CPTII,S$GLB,, | Performed by: STUDENT IN AN ORGANIZED HEALTH CARE EDUCATION/TRAINING PROGRAM

## 2024-12-03 PROCEDURE — 71046 X-RAY EXAM CHEST 2 VIEWS: CPT | Mod: 26,,, | Performed by: RADIOLOGY

## 2024-12-03 RX ORDER — SIROLIMUS 1 MG/1
1 TABLET, FILM COATED ORAL DAILY
Qty: 30 TABLET | Refills: 11 | Status: SHIPPED | OUTPATIENT
Start: 2024-12-03 | End: 2024-12-06 | Stop reason: SDUPTHER

## 2024-12-03 NOTE — ASSESSMENT & PLAN NOTE
- CT chest performed earlier today with innumerable thin walled cysts consistent with HEBERT.   - VEGF-D level ordered  - 6MWT ordered  - continue albuterol prn  - prescribed sirolimus 1 mg daily, obtain level in one week   - referred to advanced lung disease clinic, lung transplant clinic, and pulmonary rehab

## 2024-12-03 NOTE — PROGRESS NOTES
[facility]  Pulmonary Clinic Note      History of Present Illness:  Ann-Marie Saavedra is a 44 y.o. female with no significant PMHx who present to pulmonary clinic for SOB evaluation.     Patient reports she has had worsening of her breathing over the past 3 years. She is a very active person, previously a marathon runner, and she has noticed her ability to exercise has slowly declined. Notes particular worsening over the past 8 months. Underwent CXR yesterday which showed moderate R pneumothorax. CT chest performed earlier today with innumerable thin walled cysts consistent with HEBERT. Also with small pneumothorax slightly decreased in size from yesterday's CXR. Patient breathing comfortably on RA at time of exam.      Pulmonary/Cardiology Testing:    CT Chest 12/3/24  1. Small right pneumothorax, not substantially changed when compared to 12/02/2023.  2. Innumerable thin walled cysts seen throughout both lungs. 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  3. Fluid attenuation lesion in the left upper quadrant, incompletely evaluated on this exam, but measuring up to 5.3 cm.  In light of the lung findings, this may represent a lymphangioleiomyoma.  A CT of the abdomen and pelvis contrast is recommended for further evaluation.    PFT 12/2/24  FVC 3.92 (104%)  FEV1 0.92 (30%)  FEV1/FVC 24 (LLN 71)  TLC 6.13 (120%)  DLCOadj 30%  + BD response    Past Medical History:   Diagnosis Date    Abnormal Pap smear 2013    no colpo    Keloid cicatrix     under right breast    Wound dehiscence in puerperium, perineal, delivered/postpartum 9/25/2015     Past Surgical History:   Procedure Laterality Date    CYST REMOVAL Right 2009    chest wall    FOOT TENDON SURGERY Bilateral 2008    bunion     Family History   Problem Relation Name Age of Onset    Hyperlipidemia Mother      No Known Problems Father      Eczema Sister      No Known Problems Sister      No Known Problems Brother       Lung cancer Maternal Grandmother Polly Reeves     Cancer Maternal Grandmother Polly Reeves     Colon cancer Maternal Grandfather CONSTANZA Reeves     Cancer Maternal Grandfather CONSTANZA Reeves     Colon cancer Paternal Grandfather Hugo Glez 67    Cancer Paternal Grandfather Hugo Glez     Prostate cancer Paternal Uncle Shady Glez     Cancer Paternal Uncle Shady Glez     Melanoma Neg Hx      Acne Neg Hx      Lupus Neg Hx      Psoriasis Neg Hx      Breast cancer Neg Hx      Ovarian cancer Neg Hx      Heart attack Neg Hx      Stroke Neg Hx      Diabetes Neg Hx      Osteoporosis Neg Hx       Social History     Socioeconomic History    Marital status:    Occupational History    Occupation:      Employer: OTHER   Tobacco Use    Smoking status: Never    Smokeless tobacco: Never   Substance and Sexual Activity    Alcohol use: Yes     Alcohol/week: 5.0 standard drinks of alcohol     Types: 5 Glasses of wine per week    Drug use: No    Sexual activity: Yes     Partners: Male     Birth control/protection: None     Social Drivers of Health     Financial Resource Strain: Low Risk  (4/25/2024)    Overall Financial Resource Strain (CARDIA)     Difficulty of Paying Living Expenses: Not hard at all   Food Insecurity: No Food Insecurity (4/25/2024)    Hunger Vital Sign     Worried About Running Out of Food in the Last Year: Never true     Ran Out of Food in the Last Year: Never true   Transportation Needs: No Transportation Needs (4/25/2024)    PRAPARE - Transportation     Lack of Transportation (Medical): No     Lack of Transportation (Non-Medical): No   Physical Activity: Sufficiently Active (4/25/2024)    Exercise Vital Sign     Days of Exercise per Week: 4 days     Minutes of Exercise per Session: 40 min   Stress: Stress Concern Present (4/25/2024)    [employer] [place] [employer] - Occupational Stress Questionnaire     Feeling of Stress : To some extent   Housing Stability: Low Risk   "(7/5/2023)    Housing Stability Vital Sign     Unable to Pay for Housing in the Last Year: No     Number of Places Lived in the Last Year: 1     Unstable Housing in the Last Year: No     Review of patient's allergies indicates:  No Known Allergies    Review of Systems:  Review of Systems   Constitutional:  Negative for chills, fever and weight loss.   HENT:  Negative for congestion and sore throat.    Respiratory:  Positive for shortness of breath. Negative for hemoptysis.    Cardiovascular:  Negative for chest pain and leg swelling.   Gastrointestinal:  Negative for abdominal pain, nausea and vomiting.   Musculoskeletal:  Negative for joint pain.   All other systems reviewed and are negative.         OBJECTIVE:     Vital Signs  Vitals:    12/03/24 1407   BP: 109/68   BP Location: Right arm   Patient Position: Sitting   Pulse: 76   SpO2: 95%   Weight: 56.1 kg (123 lb 10.9 oz)   Height: 5' 6" (1.676 m)     Body mass index is 19.96 kg/m².    Physical Exam:  Physical Exam  Vitals reviewed.   HENT:      Head: Normocephalic and atraumatic.      Mouth/Throat:      Mouth: Mucous membranes are moist.      Pharynx: Oropharynx is clear.   Cardiovascular:      Rate and Rhythm: Normal rate and regular rhythm.   Pulmonary:      Effort: Pulmonary effort is normal.      Comments: Diffuse soft crackles  Skin:     General: Skin is warm and dry.   Neurological:      Mental Status: She is alert and oriented to person, place, and time.          Laboratory:  CBC  Lab Results   Component Value Date    WBC 8.11 12/02/2024    HGB 14.6 12/02/2024    HCT 42.8 12/02/2024     12/02/2024    MCV 90 12/02/2024    RDW 12.2 12/02/2024     BMP  Lab Results   Component Value Date     12/02/2024    K 3.6 12/02/2024     12/02/2024    CO2 22 (L) 12/02/2024    BUN 12 12/02/2024    CREATININE 0.8 12/02/2024    GLU 85 12/02/2024    CALCIUM 9.6 12/02/2024     LFTs  Lab Results   Component Value Date    PROT 7.2 12/02/2024    ALBUMIN 4.6 " 12/02/2024    BILITOT 0.9 12/02/2024    AST 22 12/02/2024    ALKPHOS 53 12/02/2024    ALT 17 12/02/2024         ASSESSMENT/PLAN:     Problem List Items Addressed This Visit          Pulmonary    Shortness of breath    Current Assessment & Plan     - see plan for HEBERT and PTX         Lymphangioleiomyomatosis - Primary    Current Assessment & Plan     - CT chest performed earlier today with innumerable thin walled cysts consistent with HEBERT.   - VEGF-D level ordered  - 6MWT ordered  - continue albuterol prn  - prescribed sirolimus 1 mg daily, obtain level in one week   - referred to advanced lung disease clinic, lung transplant clinic, and pulmonary rehab         Relevant Medications    sirolimus (RAPAMUNE) 1 MG Tab    Other Relevant Orders    Ambulatory referral/consult to Advanced Lung Disease    Vascular Endothelial Growth Factor (VEGF)    Six Minute Walk Test to qualify for Home Oxygen    SIROLIMUS LEVEL    Ambulatory referral/consult to Pulmonary Rehab    Ambulatory referral/consult to Transplant, Lung    Secondary spontaneous pneumothorax    Current Assessment & Plan     - appears smaller in size compared to yesterday's CXR  - patient asymptomatic and on RA, will monitor for now  - advised to report to ER with any worsening SOB to evaluate for worsening of PTX and need for intervention  - repeat CXR in 2 weeks         Relevant Orders    X-Ray Chest PA And Lateral     Other Visit Diagnoses       Pneumothorax, unspecified type              Discussed patient with ALD (Dr. Gamboa), patient to follow up with ALD clinic and lung transplant clinic.     Becky Herzog MD  Pulmonary/Critical Care  [facility]

## 2024-12-03 NOTE — TELEPHONE ENCOUNTER
Scheduled pt CT scan for today at main campus for 10:15. Also pt is scheduled to see pulmonary today at 2pm.

## 2024-12-03 NOTE — TELEPHONE ENCOUNTER
Spoke with radiologist, will obtain CT. Ordered stat, please assist patient with scheduling today or tomorrow. Will call her with results once CT is completed to review what to do next. Have placed a referral to pulmonology, please assist with scheduling as well. If she experiences any sudden worsening of SOB, needs to go to the emergency room. Thank you!

## 2024-12-03 NOTE — TELEPHONE ENCOUNTER
----- Message from Freddy sent at 12/2/2024  4:21 PM CST -----   Name of Who is Calling:     What is the request in detail: patient  request call back in reference to ct order / patient state she has to return tomorrow and order is needed  Please contact to further discuss and advise     Can the clinic reply by [facility]:     What Number to Call Back if not in [facility]:   [phone]

## 2024-12-03 NOTE — ASSESSMENT & PLAN NOTE
- appears smaller in size compared to yesterday's CXR  - patient asymptomatic and on RA, will monitor for now  - advised to report to ER with any worsening SOB to evaluate for worsening of PTX and need for intervention  - repeat CXR in 2 weeks

## 2024-12-04 ENCOUNTER — PATIENT MESSAGE (OUTPATIENT)
Dept: PULMONOLOGY | Facility: CLINIC | Age: 44
End: 2024-12-04
Payer: COMMERCIAL

## 2024-12-04 ENCOUNTER — TELEPHONE (OUTPATIENT)
Dept: INTERNAL MEDICINE | Facility: CLINIC | Age: 44
End: 2024-12-04
Payer: COMMERCIAL

## 2024-12-04 DIAGNOSIS — N28.89 OTHER SPECIFIED DISORDERS OF KIDNEY AND URETER: ICD-10-CM

## 2024-12-04 DIAGNOSIS — J84.81 LYMPHANGIOLEIOMYOMATOSIS: Primary | ICD-10-CM

## 2024-12-04 NOTE — TELEPHONE ENCOUNTER
----- Message from Heather sent at 12/4/2024  7:57 AM CST -----  Regarding: Telephone Call  Name of Who is Calling:  Patient          What is the request in detail:  Please call patient she stated she need to speak with Dr. Klein about test results            Can the clinic reply by [facility]: No            What Number to Call Back if not in [facility]: [phone]

## 2024-12-05 ENCOUNTER — HOSPITAL ENCOUNTER (OUTPATIENT)
Dept: PULMONOLOGY | Facility: HOSPITAL | Age: 44
Discharge: HOME | End: 2024-12-05
Attending: STUDENT IN AN ORGANIZED HEALTH CARE EDUCATION/TRAINING PROGRAM
Payer: COMMERCIAL

## 2024-12-05 ENCOUNTER — TELEPHONE (OUTPATIENT)
Dept: TRANSPLANT | Facility: CLINIC | Age: 44
End: 2024-12-05
Payer: COMMERCIAL

## 2024-12-05 NOTE — TELEPHONE ENCOUNTER
"12/9/24: Received notification that patient is hospitalized; thus, her LUT referral will be deferred until after discharge.    12/20/24: Patient has been discharged from the hospital. Verified with Dr. Brandon that the patient needs a CXR and 6 WMT when she comes for an outpatient lung transplant clinic visit. A TTE was done on 12/8/24 and CT images are available for viewing in Epic. Sent a message to  Jerodreed Vanessa asking her to obtain insurance clearance for the clinic visit and testing.     12/30/24: Financial clearance obtained. Patient has a chest xray done today per order from another provider. The 6 MWT and LUT clinic visit have been scheduled on 1/6/25. Sent patient a portal message to update her about the appointment times.      ----- Message from Kesha Brandon DO sent at 12/5/2024  2:23 PM CST -----  Regarding: RE: ALD Referral    Let's see as a LUT pt.  Will need 6MWT and TTE.  Will need a disc with CT chest images if it's not available to us.  Thanks    ----- Message -----  From: Ivelisse Dunham RN  Sent: 12/5/2024  12:56 PM CST  To: Kesha Brandon DO  Subject: ALD Referral                                     Advanced Lung Disease (ALD) Clinic Referral Note    Referral from: Dr. Becky Herzog  --- We received both an ALD and LUT referral for this patient. Please advise into which clinic she should be scheduled.    Lung diagnosis: HEBERT    Age: 44 y.o.    Height/Weight/BMI:  5' 6" / 56.1 kg/ 19.96 kg/m²    Smoking history: Social History    Tobacco Use      Smoking status: Never      Smokeless tobacco: Never    PFT date: 12/02/2024  FVC 3.92 (104.2%) FEV1 0.92 (30.2%) TLC 6.13 (120.3%) DLCO 7.94 (30.6%)  Spirometry is improved following bronchodilator administration. DLCO interpretation assumes a normal hemoglobin level.    6 MWT date: None    CXR date: 12/03/2024  Impression: Small to moderate right apical pneumothorax, similar to or slightly improved when compared to " Patient attended Phase 2 Cardiac Rehab Exercise Session. Further documentation will be completed in [facility] System and will be scanned into the medical record upon discharge.     12/02/2024.  Nonspecific interstitial coarsening throughout both lungs.         Chest CT date: 12/03/2024  1. Small right pneumothorax, not substantially changed when compared to 12/02/2023.  2. Innumerable thin walled cysts seen throughout both lungs.  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  3. Fluid attenuation lesion in the left upper quadrant, incompletely evaluated on this exam, but measuring up to 5.3 cm.  In light of the lung findings, this may represent a lymphangioleiomyoma.  A CT of the abdomen and pelvis contrast is recommended for further evaluation.       Echo date: None    Other pertinent medical history: none    Recommendations?

## 2024-12-06 ENCOUNTER — PATIENT MESSAGE (OUTPATIENT)
Dept: PULMONOLOGY | Facility: CLINIC | Age: 44
End: 2024-12-06
Payer: COMMERCIAL

## 2024-12-06 DIAGNOSIS — J84.81 LYMPHANGIOLEIOMYOMATOSIS: ICD-10-CM

## 2024-12-06 DIAGNOSIS — N28.89 OTHER SPECIFIED DISORDERS OF KIDNEY AND URETER: Primary | ICD-10-CM

## 2024-12-06 RX ORDER — SIROLIMUS 1 MG/1
1 TABLET, FILM COATED ORAL DAILY
Qty: 30 TABLET | Refills: 11 | Status: ON HOLD | OUTPATIENT
Start: 2024-12-06 | End: 2025-12-06

## 2024-12-07 ENCOUNTER — HOSPITAL ENCOUNTER (INPATIENT)
Facility: HOSPITAL | Age: 44
LOS: 10 days | Discharge: HOME | DRG: 164 | End: 2024-12-18
Attending: EMERGENCY MEDICINE | Admitting: INTERNAL MEDICINE
Payer: COMMERCIAL

## 2024-12-07 DIAGNOSIS — G89.18 POST-OP PAIN: ICD-10-CM

## 2024-12-07 DIAGNOSIS — J84.81 LYMPHANGIOLEIOMYOMATOSIS: ICD-10-CM

## 2024-12-07 DIAGNOSIS — J93.12 SECONDARY SPONTANEOUS PNEUMOTHORAX: Primary | ICD-10-CM

## 2024-12-07 DIAGNOSIS — J93.9 PNEUMOTHORAX: ICD-10-CM

## 2024-12-07 DIAGNOSIS — Q33.0 CONGENITAL CYSTIC DISEASE OF LUNG: ICD-10-CM

## 2024-12-07 DIAGNOSIS — R07.9 CHEST PAIN: ICD-10-CM

## 2024-12-07 PROBLEM — E87.5 HYPERKALEMIA: Status: ACTIVE | Noted: 2024-12-07

## 2024-12-07 LAB
ALBUMIN SERPL BCP-MCNC: 4.3 G/DL (ref 3.5–5.2)
ALP SERPL-CCNC: 49 U/L (ref 40–150)
ALT SERPL W/O P-5'-P-CCNC: 18 U/L (ref 10–44)
ANION GAP SERPL CALC-SCNC: 7 MMOL/L (ref 8–16)
AST SERPL-CCNC: 22 U/L (ref 10–40)
B-HCG UR QL: NEGATIVE
BASOPHILS # BLD AUTO: 0.03 K/UL (ref 0–0.2)
BASOPHILS NFR BLD: 0.4 % (ref 0–1.9)
BILIRUB SERPL-MCNC: 0.5 MG/DL (ref 0.1–1)
BUN SERPL-MCNC: 14 MG/DL (ref 6–20)
CALCIUM SERPL-MCNC: 9.6 MG/DL (ref 8.7–10.5)
CHLORIDE SERPL-SCNC: 108 MMOL/L (ref 95–110)
CO2 SERPL-SCNC: 24 MMOL/L (ref 23–29)
CREAT SERPL-MCNC: 0.8 MG/DL (ref 0.5–1.4)
CRP SERPL-MCNC: 0.3 MG/L (ref 0–8.2)
CTP QC/QA: YES
DIFFERENTIAL METHOD BLD: ABNORMAL
EOSINOPHIL # BLD AUTO: 0.1 K/UL (ref 0–0.5)
EOSINOPHIL NFR BLD: 1.5 % (ref 0–8)
ERYTHROCYTE [DISTWIDTH] IN BLOOD BY AUTOMATED COUNT: 12.4 % (ref 11.5–14.5)
ERYTHROCYTE [SEDIMENTATION RATE] IN BLOOD BY PHOTOMETRIC METHOD: <2 MM/HR (ref 0–36)
EST. GFR  (NO RACE VARIABLE): >60 ML/MIN/1.73 M^2
GLUCOSE SERPL-MCNC: 94 MG/DL (ref 70–110)
HCT VFR BLD AUTO: 41.4 % (ref 37–48.5)
HCV AB SERPL QL IA: NORMAL
HGB BLD-MCNC: 14 G/DL (ref 12–16)
HIV 1+2 AB+HIV1 P24 AG SERPL QL IA: NORMAL
IMM GRANULOCYTES # BLD AUTO: 0.08 K/UL (ref 0–0.04)
IMM GRANULOCYTES NFR BLD AUTO: 1.1 % (ref 0–0.5)
LYMPHOCYTES # BLD AUTO: 1.5 K/UL (ref 1–4.8)
LYMPHOCYTES NFR BLD: 21.2 % (ref 18–48)
MCH RBC QN AUTO: 30.4 PG (ref 27–31)
MCHC RBC AUTO-ENTMCNC: 33.8 G/DL (ref 32–36)
MCV RBC AUTO: 90 FL (ref 82–98)
MONOCYTES # BLD AUTO: 0.6 K/UL (ref 0.3–1)
MONOCYTES NFR BLD: 7.7 % (ref 4–15)
NEUTROPHILS # BLD AUTO: 4.8 K/UL (ref 1.8–7.7)
NEUTROPHILS NFR BLD: 68.1 % (ref 38–73)
NRBC BLD-RTO: 0 /100 WBC
PLATELET # BLD AUTO: 231 K/UL (ref 150–450)
PMV BLD AUTO: 10 FL (ref 9.2–12.9)
POTASSIUM SERPL-SCNC: 4.1 MMOL/L (ref 3.5–5.1)
POTASSIUM SERPL-SCNC: 5.6 MMOL/L (ref 3.5–5.1)
PROCALCITONIN SERPL IA-MCNC: <0.02 NG/ML
PROT SERPL-MCNC: 7.1 G/DL (ref 6–8.4)
RBC # BLD AUTO: 4.61 M/UL (ref 4–5.4)
SODIUM SERPL-SCNC: 139 MMOL/L (ref 136–145)
WBC # BLD AUTO: 7.12 K/UL (ref 3.9–12.7)

## 2024-12-07 PROCEDURE — 84132 ASSAY OF SERUM POTASSIUM: CPT | Mod: NTX | Performed by: STUDENT IN AN ORGANIZED HEALTH CARE EDUCATION/TRAINING PROGRAM

## 2024-12-07 PROCEDURE — 63600175 PHARM REV CODE 636 W HCPCS: Mod: NTX | Performed by: STUDENT IN AN ORGANIZED HEALTH CARE EDUCATION/TRAINING PROGRAM

## 2024-12-07 PROCEDURE — 96372 THER/PROPH/DIAG INJ SC/IM: CPT | Mod: 59 | Performed by: STUDENT IN AN ORGANIZED HEALTH CARE EDUCATION/TRAINING PROGRAM

## 2024-12-07 PROCEDURE — 85025 COMPLETE CBC W/AUTO DIFF WBC: CPT | Mod: NTX | Performed by: EMERGENCY MEDICINE

## 2024-12-07 PROCEDURE — 80053 COMPREHEN METABOLIC PANEL: CPT | Mod: NTX | Performed by: EMERGENCY MEDICINE

## 2024-12-07 PROCEDURE — 63600175 PHARM REV CODE 636 W HCPCS: Mod: NTX | Performed by: EMERGENCY MEDICINE

## 2024-12-07 PROCEDURE — 32551 INSERTION OF CHEST TUBE: CPT | Mod: NTX

## 2024-12-07 PROCEDURE — 81025 URINE PREGNANCY TEST: CPT | Mod: NTX | Performed by: EMERGENCY MEDICINE

## 2024-12-07 PROCEDURE — 25500020 PHARM REV CODE 255: Mod: NTX | Performed by: STUDENT IN AN ORGANIZED HEALTH CARE EDUCATION/TRAINING PROGRAM

## 2024-12-07 PROCEDURE — 94761 N-INVAS EAR/PLS OXIMETRY MLT: CPT | Mod: NTX

## 2024-12-07 PROCEDURE — 84145 PROCALCITONIN (PCT): CPT | Mod: NTX | Performed by: EMERGENCY MEDICINE

## 2024-12-07 PROCEDURE — 96375 TX/PRO/DX INJ NEW DRUG ADDON: CPT | Mod: NTX

## 2024-12-07 PROCEDURE — 96374 THER/PROPH/DIAG INJ IV PUSH: CPT | Mod: NTX

## 2024-12-07 PROCEDURE — 86803 HEPATITIS C AB TEST: CPT | Mod: NTX | Performed by: EMERGENCY MEDICINE

## 2024-12-07 PROCEDURE — 93005 ELECTROCARDIOGRAM TRACING: CPT | Mod: NTX

## 2024-12-07 PROCEDURE — 86140 C-REACTIVE PROTEIN: CPT | Mod: NTX | Performed by: EMERGENCY MEDICINE

## 2024-12-07 PROCEDURE — G0378 HOSPITAL OBSERVATION PER HR: HCPCS | Mod: NTX

## 2024-12-07 PROCEDURE — 99900035 HC TECH TIME PER 15 MIN (STAT): Mod: NTX

## 2024-12-07 PROCEDURE — 99285 EMERGENCY DEPT VISIT HI MDM: CPT | Mod: 25,NTX

## 2024-12-07 PROCEDURE — 63600175 PHARM REV CODE 636 W HCPCS: Mod: NTX

## 2024-12-07 PROCEDURE — 80195 ASSAY OF SIROLIMUS: CPT | Mod: NTX | Performed by: EMERGENCY MEDICINE

## 2024-12-07 PROCEDURE — 0W9930Z DRAINAGE OF RIGHT PLEURAL CAVITY WITH DRAINAGE DEVICE, PERCUTANEOUS APPROACH: ICD-10-PCS | Performed by: EMERGENCY MEDICINE

## 2024-12-07 PROCEDURE — 25000003 PHARM REV CODE 250: Mod: NTX | Performed by: STUDENT IN AN ORGANIZED HEALTH CARE EDUCATION/TRAINING PROGRAM

## 2024-12-07 PROCEDURE — 87389 HIV-1 AG W/HIV-1&-2 AB AG IA: CPT | Mod: NTX | Performed by: EMERGENCY MEDICINE

## 2024-12-07 PROCEDURE — 93010 ELECTROCARDIOGRAM REPORT: CPT | Mod: NTX,,, | Performed by: INTERNAL MEDICINE

## 2024-12-07 PROCEDURE — 99215 OFFICE O/P EST HI 40 MIN: CPT | Mod: NTX,,, | Performed by: INTERNAL MEDICINE

## 2024-12-07 PROCEDURE — 96376 TX/PRO/DX INJ SAME DRUG ADON: CPT | Mod: NTX

## 2024-12-07 PROCEDURE — 85652 RBC SED RATE AUTOMATED: CPT | Mod: NTX | Performed by: EMERGENCY MEDICINE

## 2024-12-07 RX ORDER — ALBUTEROL SULFATE 90 UG/1
2 INHALANT RESPIRATORY (INHALATION) EVERY 6 HOURS PRN
Status: DISCONTINUED | OUTPATIENT
Start: 2024-12-07 | End: 2024-12-18 | Stop reason: HOSPADM

## 2024-12-07 RX ORDER — KETOROLAC TROMETHAMINE 30 MG/ML
10 INJECTION, SOLUTION INTRAMUSCULAR; INTRAVENOUS
Status: COMPLETED | OUTPATIENT
Start: 2024-12-07 | End: 2024-12-07

## 2024-12-07 RX ORDER — DOXYCYCLINE 50 MG/1
50 CAPSULE ORAL DAILY
Status: DISCONTINUED | OUTPATIENT
Start: 2024-12-08 | End: 2024-12-09

## 2024-12-07 RX ORDER — ENOXAPARIN SODIUM 100 MG/ML
40 INJECTION SUBCUTANEOUS EVERY 24 HOURS
Status: DISCONTINUED | OUTPATIENT
Start: 2024-12-07 | End: 2024-12-18 | Stop reason: HOSPADM

## 2024-12-07 RX ORDER — LIDOCAINE HYDROCHLORIDE 20 MG/ML
INJECTION, SOLUTION INFILTRATION; PERINEURAL CODE/TRAUMA/SEDATION MEDICATION
Status: COMPLETED | OUTPATIENT
Start: 2024-12-07 | End: 2024-12-07

## 2024-12-07 RX ORDER — OXYCODONE HYDROCHLORIDE 5 MG/1
5 TABLET ORAL EVERY 6 HOURS PRN
Status: DISCONTINUED | OUTPATIENT
Start: 2024-12-07 | End: 2024-12-11 | Stop reason: SDUPTHER

## 2024-12-07 RX ORDER — GLUCAGON 1 MG
1 KIT INJECTION
Status: DISCONTINUED | OUTPATIENT
Start: 2024-12-07 | End: 2024-12-18 | Stop reason: HOSPADM

## 2024-12-07 RX ORDER — IBUPROFEN 200 MG
16 TABLET ORAL
Status: DISCONTINUED | OUTPATIENT
Start: 2024-12-07 | End: 2024-12-18 | Stop reason: HOSPADM

## 2024-12-07 RX ORDER — HYDROMORPHONE HYDROCHLORIDE 1 MG/ML
1 INJECTION, SOLUTION INTRAMUSCULAR; INTRAVENOUS; SUBCUTANEOUS EVERY 4 HOURS PRN
Status: DISPENSED | OUTPATIENT
Start: 2024-12-07 | End: 2024-12-10

## 2024-12-07 RX ORDER — LIDOCAINE HYDROCHLORIDE 20 MG/ML
INJECTION, SOLUTION INFILTRATION; PERINEURAL
Status: DISPENSED
Start: 2024-12-07 | End: 2024-12-08

## 2024-12-07 RX ORDER — MORPHINE SULFATE 4 MG/ML
4 INJECTION, SOLUTION INTRAMUSCULAR; INTRAVENOUS
Status: COMPLETED | OUTPATIENT
Start: 2024-12-07 | End: 2024-12-07

## 2024-12-07 RX ORDER — SENNOSIDES 8.6 MG/1
8.6 TABLET ORAL DAILY
Status: DISCONTINUED | OUTPATIENT
Start: 2024-12-07 | End: 2024-12-13

## 2024-12-07 RX ORDER — KETOROLAC TROMETHAMINE 30 MG/ML
15 INJECTION, SOLUTION INTRAMUSCULAR; INTRAVENOUS ONCE
Status: COMPLETED | OUTPATIENT
Start: 2024-12-07 | End: 2024-12-07

## 2024-12-07 RX ORDER — ONDANSETRON HYDROCHLORIDE 2 MG/ML
4 INJECTION, SOLUTION INTRAVENOUS
Status: COMPLETED | OUTPATIENT
Start: 2024-12-07 | End: 2024-12-07

## 2024-12-07 RX ORDER — LIDOCAINE HYDROCHLORIDE 20 MG/ML
10 INJECTION, SOLUTION EPIDURAL; INFILTRATION; INTRACAUDAL; PERINEURAL
Status: COMPLETED | OUTPATIENT
Start: 2024-12-07 | End: 2024-12-07

## 2024-12-07 RX ORDER — SIROLIMUS 1 MG/1
1 TABLET, FILM COATED ORAL DAILY
Status: DISCONTINUED | OUTPATIENT
Start: 2024-12-08 | End: 2024-12-11

## 2024-12-07 RX ORDER — KETAMINE HYDROCHLORIDE 50 MG/ML
0.5 INJECTION, SOLUTION INTRAMUSCULAR; INTRAVENOUS
Status: DISCONTINUED | OUTPATIENT
Start: 2024-12-07 | End: 2024-12-07

## 2024-12-07 RX ORDER — IBUPROFEN 200 MG
24 TABLET ORAL
Status: DISCONTINUED | OUTPATIENT
Start: 2024-12-07 | End: 2024-12-18 | Stop reason: HOSPADM

## 2024-12-07 RX ORDER — SODIUM CHLORIDE 0.9 % (FLUSH) 0.9 %
10 SYRINGE (ML) INJECTION EVERY 12 HOURS PRN
Status: DISCONTINUED | OUTPATIENT
Start: 2024-12-07 | End: 2024-12-18 | Stop reason: HOSPADM

## 2024-12-07 RX ORDER — MIDAZOLAM HYDROCHLORIDE 1 MG/ML
2 INJECTION, SOLUTION INTRAMUSCULAR; INTRAVENOUS
Status: COMPLETED | OUTPATIENT
Start: 2024-12-07 | End: 2024-12-07

## 2024-12-07 RX ORDER — AZELASTINE 1 MG/ML
2 SPRAY, METERED NASAL 2 TIMES DAILY
Status: DISCONTINUED | OUTPATIENT
Start: 2024-12-07 | End: 2024-12-12

## 2024-12-07 RX ORDER — HYDROMORPHONE HYDROCHLORIDE 1 MG/ML
0.5 INJECTION, SOLUTION INTRAMUSCULAR; INTRAVENOUS; SUBCUTANEOUS
Status: DISCONTINUED | OUTPATIENT
Start: 2024-12-07 | End: 2024-12-07

## 2024-12-07 RX ORDER — NALOXONE HCL 0.4 MG/ML
0.02 VIAL (ML) INJECTION
Status: DISCONTINUED | OUTPATIENT
Start: 2024-12-07 | End: 2024-12-18 | Stop reason: HOSPADM

## 2024-12-07 RX ADMIN — LIDOCAINE HYDROCHLORIDE 5 ML: 20 INJECTION, SOLUTION INFILTRATION; PERINEURAL at 01:12

## 2024-12-07 RX ADMIN — KETOROLAC TROMETHAMINE 15 MG: 30 INJECTION, SOLUTION INTRAMUSCULAR; INTRAVENOUS at 08:12

## 2024-12-07 RX ADMIN — MIDAZOLAM HYDROCHLORIDE 2 MG: 2 INJECTION, SOLUTION INTRAMUSCULAR; INTRAVENOUS at 12:12

## 2024-12-07 RX ADMIN — HYDROMORPHONE HYDROCHLORIDE 1 MG: 1 INJECTION, SOLUTION INTRAMUSCULAR; INTRAVENOUS; SUBCUTANEOUS at 05:12

## 2024-12-07 RX ADMIN — HYDROMORPHONE HYDROCHLORIDE 0.5 MG: 1 INJECTION, SOLUTION INTRAMUSCULAR; INTRAVENOUS; SUBCUTANEOUS at 02:12

## 2024-12-07 RX ADMIN — MORPHINE SULFATE 4 MG: 4 INJECTION INTRAVENOUS at 03:12

## 2024-12-07 RX ADMIN — LIDOCAINE HYDROCHLORIDE 10 ML: 20 INJECTION, SOLUTION INFILTRATION; PERINEURAL at 01:12

## 2024-12-07 RX ADMIN — SENNOSIDES 8.6 MG: 8.6 TABLET, FILM COATED ORAL at 05:12

## 2024-12-07 RX ADMIN — KETOROLAC TROMETHAMINE 10 MG: 30 INJECTION, SOLUTION INTRAMUSCULAR; INTRAVENOUS at 12:12

## 2024-12-07 RX ADMIN — ONDANSETRON 4 MG: 2 INJECTION INTRAMUSCULAR; INTRAVENOUS at 05:12

## 2024-12-07 RX ADMIN — LIDOCAINE HYDROCHLORIDE 200 MG: 20 INJECTION, SOLUTION EPIDURAL; INFILTRATION; INTRACAUDAL; PERINEURAL at 12:12

## 2024-12-07 RX ADMIN — IOHEXOL 75 ML: 350 INJECTION, SOLUTION INTRAVENOUS at 11:12

## 2024-12-07 RX ADMIN — HYDROMORPHONE HYDROCHLORIDE 1 MG: 1 INJECTION, SOLUTION INTRAMUSCULAR; INTRAVENOUS; SUBCUTANEOUS at 09:12

## 2024-12-07 RX ADMIN — ENOXAPARIN SODIUM 40 MG: 40 INJECTION SUBCUTANEOUS at 05:12

## 2024-12-07 NOTE — ED PROVIDER NOTES
Encounter Date: 12/7/2024       History     Chief Complaint   Patient presents with    Chest Injury     Pt diagnosed with a pneumothorax, seen earlier this week for chest pain and SOB and sent here for a chest tube. 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 Patient presents in the ED as recommended by her Pulmonologist for a chest tube placement for the spontaneous pneumothorax. Patient has been feeling SOB since 3 years ago and she was recently diagnosed with HEBERT and placed on sirolimus treatment. On 12/3/24, her CXR showed a right apical pneumothorax for which she will need a pigtail placed. Patient also endorses mild dull chest pain along the left costal margins without radiation. Patient denied fever, chills, nausea, vomiting, abdominal pain, hoarseness, headache, dizziness, palpitation, cough and chest wall tenderness.         Review of patient's allergies indicates:  No Known Allergies  Past Medical History:   Diagnosis Date    Abnormal Pap smear 2013    no colpo    Keloid cicatrix     under right breast    Wound dehiscence in puerperium, perineal, delivered/postpartum 9/25/2015     Past Surgical History:   Procedure Laterality Date    CYST REMOVAL Right 2009    chest wall    FOOT TENDON SURGERY Bilateral 2008    bunion     Family History   Problem Relation Name Age of Onset    Hyperlipidemia Mother      No Known Problems Father      Eczema Sister      No Known Problems Sister      No Known Problems Brother      Lung cancer Maternal Grandmother Polly Leandro     Cancer Maternal Grandmother Polly Leandro     Colon cancer Maternal Grandfather A.C. Leandro     Cancer Maternal Grandfather A.C. Leandro     Colon cancer Paternal Grandfather Hugo Glez 67    Cancer Paternal Grandfather Hugo Glez     Prostate cancer Paternal Uncle Shady Glez     Cancer Paternal Uncle Shady Glez      "Melanoma Neg Hx      Acne Neg Hx      Lupus Neg Hx      Psoriasis Neg Hx      Breast cancer Neg Hx      Ovarian cancer Neg Hx      Heart attack Neg Hx      Stroke Neg Hx      Diabetes Neg Hx      Osteoporosis Neg Hx       Social History     Tobacco Use    Smoking status: Never    Smokeless tobacco: Never   Substance Use Topics    Alcohol use: Yes     Alcohol/week: 5.0 standard drinks of alcohol     Types: 5 Glasses of wine per week    Drug use: No     Review of Systems  Per HPI  Physical Exam     Initial Vitals [12/07/24 1012]   BP Pulse Resp Temp SpO2   128/72 88 16 98 °F (36.7 °C) 98 %      MAP       --         Physical Exam    Constitutional: She appears well-developed and well-nourished. She is not diaphoretic. No distress.   Cardiovascular:  Normal rate, regular rhythm and normal heart sounds.           No murmur heard.  Pulmonary/Chest: No respiratory distress. She has no wheezes. She has no rales.   Decreased breath sounds upper right    Abdominal: Abdomen is soft. There is no abdominal tenderness.     Neurological: She is alert. GCS score is 15. GCS eye subscore is 4. GCS verbal subscore is 5. GCS motor subscore is 6.   Skin: Skin is warm and dry.   Psychiatric: She has a normal mood and affect.         ED Course   Chest Tube    Date/Time: 12/7/2024 4:32 PM  Location procedure was performed: [facility] EMERGENCY DEPARTMENT    Performed by: Percy Fish MD  Authorized by: Carlitos Ramos MD  Consent Done: Yes  Consent: Written consent obtained.  Consent given by: patient  Time out: Immediately prior to procedure a "time out" was called to verify the correct patient, procedure, equipment, support staff and site/side marked as required.  Indications: pneumothorax    Patient sedated: yes  Sedation type: anxiolysis    Sedatives: midazolam    Anesthesia:  Local Anesthetic: lidocaine 2% without epinephrine  Anesthetic total: 15 mL  Preparation: skin prepped with ChloraPrep and skin prepped with alcohol  Placement " location: right lateral  Scalpel size: 11  Tube size: 40 [place]  Ultrasound guidance: no  Tension pneumothorax heard: no  Tube connected to: water seal  Drainage amount: 0 ml  Post-insertion x-ray findings: tube in good position  Patient tolerance: Patient tolerated the procedure well with no immediate complications  Estimated blood loss (mL): 0        Labs Reviewed   CBC W/ AUTO DIFFERENTIAL - Abnormal       Result Value    WBC 7.12      RBC 4.61      Hemoglobin 14.0      Hematocrit 41.4      MCV 90      MCH 30.4      MCHC 33.8      RDW 12.4      Platelets 231      MPV 10.0      Immature Granulocytes 1.1 (*)     Gran # (ANC) 4.8      Immature Grans (Abs) 0.08 (*)     Lymph # 1.5      Mono # 0.6      Eos # 0.1      Baso # 0.03      nRBC 0      Gran % 68.1      Lymph % 21.2      Mono % 7.7      Eosinophil % 1.5      Basophil % 0.4      Differential Method Automated     COMPREHENSIVE METABOLIC PANEL - Abnormal    Sodium 139      Potassium 5.6 (*)     Chloride 108      CO2 24      Glucose 94      BUN 14      Creatinine 0.8      Calcium 9.6      Total Protein 7.1      Albumin 4.3      Total Bilirubin 0.5      Alkaline Phosphatase 49      AST 22      ALT 18      eGFR >60.0      Anion Gap 7 (*)    CULTURE, RESPIRATORY   HEPATITIS C ANTIBODY    Hepatitis C Ab Non-reactive      Narrative:     Release to patient->Immediate   HIV 1 / 2 ANTIBODY    HIV 1/2 Ag/Ab Non-reactive      Narrative:     Release to patient->Immediate   PROCALCITONIN   C-REACTIVE PROTEIN   SEDIMENTATION RATE   C-REACTIVE PROTEIN    CRP 0.3      Narrative:     add on [id number] per Je Constantino MD    12/07/2024  15:48 Anthony   SIROLIMUS LEVEL   PROCALCITONIN    Procalcitonin <0.02      Narrative:     Release to patient->Immediate    ADD-ON # PCAL # [id number] JAZMYN AYALA MD  12/07/2024  16:45    POTASSIUM    Potassium 4.1     SEDIMENTATION RATE    Sed Rate <2      Narrative:     Add on ESR to [id number] and SIROL [id number] per Je Constantino MD    12/07/2024  17:02       add on [id number] per Je Constantino MD    12/07/2024  15:48 Anthony   POCT URINE PREGNANCY    POC Preg Test, Ur Negative       Acceptable Yes          ECG Results              EKG 12-lead (Final result)        Collection Time Result Time QRS Duration OHS QTC Calculation    12/07/24 10:12:47 12/08/24 12:59:19 74 413                     Final result by Interface, Lab In [facility] (12/08/24 12:59:26)                   Narrative:    Test Reason : R07.9,    Vent. Rate :  75 BPM     Atrial Rate :  75 BPM     P-R Int : 130 ms          QRS Dur :  74 ms      QT Int : 370 ms       P-R-T Axes :  76  83  60 degrees    QTcB Int : 413 ms    Normal sinus rhythm  Normal ECG  When compared with ECG of 25-Apr-2024 10:32,  No significant change was found  Confirmed by Tawanda Whitmore ([id number]) on 12/8/2024 12:59:18 PM    Referred By: AAAREFERRAL SELF           Confirmed By: Sanjeevyar Myranda                                  Imaging Results              CT Chest Abdomen Pelvis With IV Contrast (XPD) NO Oral Contrast (Final result)  Result time 12/07/24 23:36:12   Procedure changed from CT Abdomen Pelvis With IV Contrast NO Oral Contrast     Final result by Hugo Bragg MD (12/07/24 23:36:12)                   Impression:      Findings suggestive of lymphangiomyomatosis with worsening pleural based nodularity and parenchymal opacities in the right middle lobe, likely infectious/inflammatory.  Suggest correlation with symptoms.    Moderate right-sided pneumothorax, mildly larger when compared with prior CT chest, despite presence of right-sided chest tube.  Suggest correlation with chest tube function.  Trace bilateral pleural fluid.    Retroperitoneal lymphadenopathy.  Decreased size of previously described soft tissue/collection in left upper quadrant of the abdomen, noting that lymphangioleiomyomas may be hormone responsive and wax and wane through the menstrual cycle.    Additional  findings discussed in the body of the report.      Electronically signed by: Hugo Bragg MD  Date:    12/07/2024  Time:    23:36               Narrative:    EXAMINATION:  CT CHEST ABDOMEN PELVIS WITH IV CONTRAST (XPD)    CLINICAL HISTORY:  concern for HEBERT and lymphangioleiomyoma;    TECHNIQUE:  Low dose axial images, sagittal and coronal reformations were obtained from the thoracic inlet to the pubic symphysis following the IV administration of 75 mL of Omnipaque 350 .  Oral contrast was not given.    COMPARISON:  Chest radiograph, 12/07/2024.  CT chest, 12/03/2024.    FINDINGS:  Chest:    Base of the neck is negative for acute finding.    Heart size is normal.  Thoracic aorta and pulmonary arteries are unremarkable.  Left vertebral artery likely originates directly from the aorta.    Trace bilateral pleural fluid.  Extensive bilateral thin walled cysts and scattered ground-glass densities.  Increased parenchymal attenuation in the right middle lobe, potentially infectious or inflammatory, new from prior.  Moderate-sized right pneumothorax with up to 3.2 cm pleural separation along the anterior right lung base (series 2, image 62), larger when compared with prior CT despite positioning of right-sided chest tube in the anterior right pleural space.  Suggest correlation with chest tube function.  Mild pleural thickening with trace pleural fluid and nodularity along the fissures.    No bulky mediastinal lymphadenopathy.  Esophagus is unremarkable.    Abdomen:    Liver and gallbladder are negative for acute finding.  No worrisome liver mass.  Focal fat infiltration along the falciform ligament.    Spleen, adrenals, and pancreas are negative for acute finding.    Kidneys are negative for acute finding.  No hydronephrosis.    Stomach is mildly distended with fluid and debris.  No bowel obstruction.  Appendix is likely normal though not well delineated.    No pneumoperitoneum.  Trace volume abdominopelvic free fluid.   Decreased size of previously seen fluid collection/soft tissue lesion in the left upper quadrant when compared with prior CT chest.    Few prominent retroperitoneal lymph nodes including a lymph node in the left upper retroperitoneum measuring up to 1.6 cm in size (axial series 3, image 46; coronal image 61).  Unclear if the largest suspected retroperitoneal lymph node in the left upper quadrant represents residual collection in the left upper quadrant, though this finding has significantly decreased in size compared to prior chest CT.  Paucity of intra-abdominal fat limits evaluation for mesenteric adenopathy.    Abdominal aorta is normal in caliber without significant calcific atherosclerosis.    Portal, splenic, and superior mesenteric veins are patent.  No portal venous gas.    Pelvis:    Urinary bladder is unremarkable.  Follicle in the right adnexa with crenulated hyperdense rim, likely a physiologic follicle or corpus luteum cyst versus small hemorrhagic ovarian cyst.  Small volume pelvic free fluid with intermediate density.  IUD in the uterus.    Bones and soft tissues:    No aggressive osseous lesions.  Minimal body wall edema.                                       X-Ray Chest AP Portable (Final result)  Result time 12/07/24 19:16:28      Final result by Óscar Thornton MD (12/07/24 19:16:28)                   Impression:      As above      Electronically signed by: Óscar Thornton MD  Date:    12/07/2024  Time:    19:16               Narrative:    EXAMINATION:  XR CHEST AP PORTABLE    CLINICAL HISTORY:  re-eval size of ptx;    TECHNIQUE:  Single frontal view of the chest was performed.    COMPARISON:  12/07/2024    FINDINGS:  Right pleural drainage catheter is in place.  There is right pneumothorax, grossly similar to the previous examination to possibly slightly enlarged allowing for differences in inspiratory effort.  No significant other detrimental change since the previous exam.                                        X-Ray Chest AP Portable (Final result)  Result time 12/07/24 15:08:51      Final result by Vanessa Gray MD (12/07/24 15:08:51)                   Impression:      Improved small right pneumothorax when compared to the chest radiograph performed earlier today status post chest tube placement.      Electronically signed by: Vanessa Gray  Date:    12/07/2024  Time:    15:08               Narrative:    EXAMINATION:  XR CHEST AP PORTABLE    CLINICAL HISTORY:  thoracosto;    TECHNIQUE:  Single frontal view of the chest was performed.    COMPARISON:  Chest radiograph performed earlier today    FINDINGS:  Improved small right pneumothorax status post chest tube placement when compared to the chest radiograph performed earlier today.  Otherwise no change.                                       X-Ray Chest AP Portable (Final result)  Result time 12/07/24 12:15:39      Final result by Vanessa Gray MD (12/07/24 12:15:39)                   Impression:      1. Unchanged small right pneumothorax when compared to 12/03/2024.  2. Redemonstration of innumerable thin walled cysts seen throughout both lungs better seen on the CT performed 12/03/2024.      Electronically signed by: Vanessa Gray  Date:    12/07/2024  Time:    12:15               Narrative:    EXAMINATION:  XR CHEST AP PORTABLE    CLINICAL HISTORY:  Pneumothorax, unspecified    TECHNIQUE:  Single frontal view of the chest was performed.    COMPARISON:  12/03/2024    FINDINGS:  A small right pneumothorax is present, unchanged when compared to 12/03/2024.  Innumerable thin walled cysts are again seen throughout both lungs, better seen on the CT performed on 12/03/2024.  No large effusion.  Heart size is unchanged.                                       Medications   albuterol inhaler 2 puff (has no administration in time range)   azelastine 137 mcg (0.1 %) nasal spray 274 mcg (0 mcg Nasal Hold 12/11/24 0900)   sodium chloride 0.9%  flush 10 mL (has no administration in time range)   naloxone 0.4 mg/mL injection 0.02 mg (has no administration in time range)   glucose chewable tablet 16 g (has no administration in time range)   glucose chewable tablet 24 g (has no administration in time range)   glucagon (human recombinant) injection 1 mg (has no administration in time range)   enoxaparin injection 40 mg (40 mg Subcutaneous Given 12/10/24 1640)   dextrose 10% bolus 125 mL 125 mL (has no administration in time range)   dextrose 10% bolus 250 mL 250 mL (has no administration in time range)   sirolimus tablet 1 mg (1 mg Oral Not Given 12/11/24 0900)   HYDROmorphone injection 1 mg (1 mg Intravenous Given 12/9/24 0625)   oxyCODONE immediate release tablet 5 mg (has no administration in time range)   senna tablet 8.6 mg (0 mg Oral Hold 12/11/24 0900)   doxycycline capsule 50 mg (0 mg Oral Hold 12/11/24 0900)   ketorolac injection 9.999 mg (9.999 mg Intravenous Given 12/7/24 1213)   midazolam injection 2 mg (2 mg Intravenous Given 12/7/24 1245)   LIDOcaine (PF) 20 mg/mL (2%) injection 200 mg (200 mg Intradermal Given by Provider 12/7/24 1245)   LIDOcaine HCL 20 mg/ml (2%) injection ( Other Canceled Entry 12/7/24 1330)   morphine injection 4 mg (4 mg Intravenous Given 12/7/24 1542)   ondansetron injection 4 mg (4 mg Intravenous Given 12/7/24 1734)   ketorolac injection 15 mg (15 mg Intravenous Given 12/7/24 2047)   iohexoL (OMNIPAQUE 350) injection 75 mL (75 mLs Intravenous Given 12/7/24 2300)     Medical Decision Making  Patient is a 44-year-old afebrile, hemodynamically stable, in no acute distress female presenting due to findings of pneumothorax on imaging.    Repeat chest x-ray performed shows stable findings of right-sided pneumothorax.  Patient presented hemodynamically stable.  There was no findings of midline shift of trachea.  Low suspicion for tension pneumothorax.  No costophrenic angle blunting.  No significant opacity.  Hemoglobin stable.   Low suspicion for hemothorax.  Potassium mildly elevated.  Patient denies symptoms of fatigue, weakness, muscle spasms.  EKG not showing signs of peaked T waves or QT shortening or NJ prolongation.  No elevation in creatinine.  GFR normal.  Low suspicion for FARRUKH or renal failure as cause hyperkalemia.  No hemolysis seen on specimen.  Will trend.  White count WNL.  No interstitial opacity noted on chest x-ray.  No cough.  Low suspicion for pneumonia.  Chest tube placed at bedside.  Procedure well tolerated.  Patient given midazolam prior to onset of procedure.  Pain controlled with local lidocaine.  Good air return in chest tube.  Repeat x-ray shows adequate placement of chest tube.  Pneumothorax shows interval improvement without complete resolution.  Plan is to admit the patient to Hospital Medicine for further evaluation and monitoring.  Discussed plan with patient who is agreement.  Questions answered.    Amount and/or Complexity of Data Reviewed  Labs: ordered. Decision-making details documented in ED Course.  Radiology: ordered.    Risk  Prescription drug management.              Attending Attestation:   Physician Attestation Statement for Resident:  As the supervising MD   Physician Attestation Statement: I have personally seen and examined this patient.   I agree with the above history.  -:   As the supervising MD I agree with the above PE.     As the supervising MD I agree with the above treatment, course, plan, and disposition.            Attending Critical Care:   Critical Care Times:   Direct Patient Care (initial evaluation, reassessments, and time considering the case)................................................................20 minutes.   Ordering, Reviewing, and Interpreting Diagnostic Studies...............................................................................................................5 minutes.    Documentation..................................................................................................................................................................................5 minutes.   ==============================================================  Total Critical Care Time - exclusive of procedural time: 30 minutes.  ==============================================================  Critical care reasons: PTX.   The following critical care procedures were done by me (see procedure notes): chest tube placement.   Critical care was time spent personally by me on the following activities: obtaining history from patient or relative, examination of patient, review of old charts, ordering lab, x-rays, and/or EKG, development of treatment plan with patient or relative, ordering and performing treatments and interventions, evaluation of patient's response to treatment, interpretation of cardiac measurements and re-evaluation of patient's conition.   Critical Care Condition: potentially life-threatening       Attending ED Notes:   STAFF ATTENDING PHYSICIAN NOTE:  I have individually/jointly evaluated Ann-Marie Saavedra and discussed their ED management with the resident physician. I have also reviewed their notes, assessments, and procedures documented.  I was present during all critical portions of any procedure(s) performed on Ann-Marie Saavedra.   ____________________  Cuauhtemoc PHOENIX Ramos MD, FAAEM  Emergency Medicine Staff        ED Course as of 12/11/24 0952   Sat Dec 07, 2024   1416 X-Ray Chest AP Portable [MB]   1642 Potassium(!): 5.6 [MB-2]   1642 Sodium: 139 [MB-2]   1642 WBC: 7.12 [MB-2]   1642 Hemoglobin: 14.0 [MB-2]   1642 Creatinine: 0.8 [MB-2]      ED Course User Index  [MB] Percy Callahan MD  [MB-2] Percy Fish MD                           Clinical Impression:  Final diagnoses:  [R07.9] Chest pain  [J93.9] Pneumothorax          ED Disposition Condition    Observation                  Percy Fish MD  Resident  12/07/24 1647       Percy Fish MD  Resident  12/07/24 1649       Carlitos Ramos MD  12/11/24 0988

## 2024-12-07 NOTE — ED PROVIDER NOTES
Encounter Date: 12/7/2024       History     Chief Complaint   Patient presents with    Chest Injury     Pt diagnosed with a pneumothorax, seen earlier this week for chest pain and SOB and sent here for a chest tube. 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 Patient presents in the ED as recommended by her Pulmonologist for a chest tube placement for the spontaneous pneumothorax. Patient has been feeling SOB since 3 years ago and she was recently diagnosed with HEBERT and placed on sirolimus treatment. On 12/3/24, her CXR showed a right apical pneumothorax for which she will need a pigtail placed. Patient also endorses mild dull chest pain along the left costal margins without radiation. Patient denied fever, chills, nausea, vomiting, abdominal pain, hoarseness, headache, dizziness, palpitation, cough and chest wall tenderness.     Review of patient's allergies indicates:  No Known Allergies  Past Medical History:   Diagnosis Date    Abnormal Pap smear 2013    no colpo    Keloid cicatrix     under right breast    Wound dehiscence in puerperium, perineal, delivered/postpartum 9/25/2015     Past Surgical History:   Procedure Laterality Date    CYST REMOVAL Right 2009    chest wall    FOOT TENDON SURGERY Bilateral 2008    bunion     Family History   Problem Relation Name Age of Onset    Hyperlipidemia Mother      No Known Problems Father      Eczema Sister      No Known Problems Sister      No Known Problems Brother      Lung cancer Maternal Grandmother Polly Leandro     Cancer Maternal Grandmother Polly Leandro     Colon cancer Maternal Grandfather A.C. Leandro     Cancer Maternal Grandfather A.C. Leandro     Colon cancer Paternal Grandfather Hugo Glez 67    Cancer Paternal Grandfather Hugo Glez     Prostate cancer Paternal Uncle Shady Glez     Cancer Paternal Uncle Shady Glez      Melanoma Neg Hx      Acne Neg Hx      Lupus Neg Hx      Psoriasis Neg Hx      Breast cancer Neg Hx      Ovarian cancer Neg Hx      Heart attack Neg Hx      Stroke Neg Hx      Diabetes Neg Hx      Osteoporosis Neg Hx       Social History     Tobacco Use    Smoking status: Never    Smokeless tobacco: Never   Substance Use Topics    Alcohol use: Yes     Alcohol/week: 5.0 standard drinks of alcohol     Types: 5 Glasses of wine per week    Drug use: No     Review of Systems   Constitutional: Negative.    HENT: Negative.     Eyes: Negative.    Respiratory:  Positive for shortness of breath.    Cardiovascular: Negative.  Negative for chest pain.   Gastrointestinal: Negative.    Neurological: Negative.        Physical Exam     Initial Vitals [12/07/24 1012]   BP Pulse Resp Temp SpO2   128/72 88 16 98 °F (36.7 °C) 98 %      MAP       --         Physical Exam    Constitutional: She appears well-developed and well-nourished.  Non-toxic appearance. No distress.   HENT:   Head: Normocephalic and atraumatic.   Eyes: EOM are normal.   Neck: Trachea normal. Neck supple. No tracheal deviation present.   Normal range of motion.  Cardiovascular:  Normal rate, regular rhythm and normal heart sounds.           Pulmonary/Chest: No respiratory distress. She has decreased breath sounds in the right upper field and the left upper field. She has no wheezes. She has no rhonchi. She has no rales.   Abdominal: Abdomen is soft.   Musculoskeletal:      Cervical back: Normal range of motion and neck supple.     Neurological: She is alert and oriented to person, place, and time.   Skin: Skin is warm and dry.         ED Course   Procedures  Labs Reviewed   HEPATITIS C ANTIBODY   HIV 1 / 2 ANTIBODY   CBC W/ AUTO DIFFERENTIAL   COMPREHENSIVE METABOLIC PANEL   PREGNANCY TEST, URINE RAPID          Imaging Results    None          Medications   ketorolac injection 9.999 mg (has no administration in time range)     Medical Decision Making              ED Course as of 12/08/24 0837   Sat Dec 07, 2024   1416 X-Ray Chest AP Portable [MB]   1642 Potassium(!): 5.6 [MB-2]   1642 Sodium: 139 [MB-2]   1642 WBC: 7.12 [MB-2]   1642 Hemoglobin: 14.0 [MB-2]   1642 Creatinine: 0.8 [MB-2]      ED Course User Index  [MB] Percy Callahan MD  [MB-2] Percy Fish MD                           Clinical Impression:  Final diagnoses:  [R07.9] Chest pain  [J93.9] Pneumothorax                 Carlitos Ramos MD  12/17/24 0888

## 2024-12-07 NOTE — HPI
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       Of note, she has been having worsening shortness of breath for years, intermittently with chest pain that has limited her work. Mostly SOB with  activity that has limited her tennis/running and daily work. 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 She is currently being referred to advanced lung disease and lung transplant.     Seen by pulmonology in ED, chest tube placed in ED. Chest xray after chest tube placement shows adequate placement with interval decrease in pneumothorax without complete resolution. Being admitted to  for workup and management of PTX and hyperkalemia.

## 2024-12-07 NOTE — ED NOTES
Tolerated  chest tube insertion well.  Connected to suction per MD, occlusive dressing applied per MD

## 2024-12-07 NOTE — ASSESSMENT & PLAN NOTE
Hyperkalemia is likely due to  Unclear cause .The patients most recent potassium results are listed below.  Recent Labs     12/07/24  1151   K 5.6*     Plan  - Monitor for arrhythmias with EKG and/or continuous telemetry.   - Repeat K - if still elevated will treat .   - Monitor potassium: Every 12 hours

## 2024-12-07 NOTE — ASSESSMENT & PLAN NOTE
Iso lymphangioleiomyomatosis diagnosis  CXR/Ct chest on admission with PTX   S/p chest tube placement in ED  Pulmonology consulted and managing CT  Supplemental O2 via NC  CXR at 1800 - if PTX not improving will get CT chest  Pain control: Oxycodone 5 q 6 PRN and dilaudid 1 q 4 PRN (start prophylactic stool softer)

## 2024-12-07 NOTE — ED TRIAGE NOTES
Pt recently dx with progressive lung disease; was told to come to ED by pulmonologist to have chest tube place. Pt reports SOB and left sided CP

## 2024-12-07 NOTE — SUBJECTIVE & OBJECTIVE
Past Medical History:   Diagnosis Date    Abnormal Pap smear 2013    no colpo    Keloid cicatrix     under right breast    Wound dehiscence in puerperium, perineal, delivered/postpartum 9/25/2015       Past Surgical History:   Procedure Laterality Date    CYST REMOVAL Right 2009    chest wall    FOOT TENDON SURGERY Bilateral 2008    bunion       Review of patient's allergies indicates:  No Known Allergies    No current facility-administered medications on file prior to encounter.     Current Outpatient Medications on File Prior to Encounter   Medication Sig    doxycycline (ORACEA) 40 mg capsule Take 40 mg by mouth.    sirolimus (RAPAMUNE) 1 MG Tab Take 1 tablet (1 mg total) by mouth once daily.    albuterol (VENTOLIN HFA) 90 mcg/actuation inhaler Inhale 2 puffs into the lungs every 6 (six) hours as needed for Wheezing or Shortness of Breath. Rescue    azelastine (ASTELIN) 137 mcg (0.1 %) nasal spray 2 sprays (274 mcg total) by Nasal route 2 (two) times daily.    levocetirizine (XYZAL) 5 MG tablet Take 1 tablet (5 mg total) by mouth every evening.    multivitamin (THERAGRAN) per tablet Take 1 tablet by mouth once daily.     Family History       Problem Relation (Age of Onset)    Cancer Maternal Grandmother, Maternal Grandfather, Paternal Grandfather, Paternal Uncle    Colon cancer Maternal Grandfather, Paternal Grandfather (67)    Eczema Sister    Hyperlipidemia Mother    Lung cancer Maternal Grandmother    No Known Problems Father, Sister, Brother    Prostate cancer Paternal Uncle          Tobacco Use    Smoking status: Never    Smokeless tobacco: Never   Substance and Sexual Activity    Alcohol use: Yes     Alcohol/week: 5.0 standard drinks of alcohol     Types: 5 Glasses of wine per week    Drug use: No    Sexual activity: Yes     Partners: Male     Birth control/protection: None     Review of Systems   Constitutional:  Positive for activity change and fatigue. Negative for chills.   Respiratory:  Positive for  chest tightness and shortness of breath.    Cardiovascular:  Positive for chest pain. Negative for palpitations and leg swelling.   Gastrointestinal:  Negative for abdominal distention and abdominal pain.   Musculoskeletal:  Negative for arthralgias and back pain.   Neurological:  Negative for syncope.   Psychiatric/Behavioral:  Negative for agitation and behavioral problems.      Objective:     Vital Signs (Most Recent):  Temp: 98 °F (36.7 °C) (12/07/24 1012)  Pulse: 75 (12/07/24 1557)  Resp: 18 (12/07/24 1600)  BP: 113/64 (12/07/24 1557)  SpO2: 98 % (12/07/24 1557) Vital Signs (24h Range):  Temp:  [98 °F (36.7 °C)] 98 °F (36.7 °C)  Pulse:  [61-93] 75  Resp:  [16-32] 18  SpO2:  [95 %-100 %] 98 %  BP: ()/(56-78) 113/64     Weight: 54.4 kg (120 lb)  Body mass index is 19.37 kg/m².     Physical Exam  Constitutional:       Appearance: She is ill-appearing.   HENT:      Mouth/Throat:      Mouth: Mucous membranes are moist.      Pharynx: Oropharynx is clear.   Eyes:      Extraocular Movements: Extraocular movements intact.   Cardiovascular:      Rate and Rhythm: Normal rate and regular rhythm.   Pulmonary:      Effort: Respiratory distress present.      Breath sounds: Rhonchi present.      Comments: Mild respiratory distress from pain  Chest:      Chest wall: Tenderness present.   Abdominal:      General: Bowel sounds are normal. There is no distension.      Palpations: Abdomen is soft.      Tenderness: There is no abdominal tenderness.   Musculoskeletal:         General: Tenderness present. No swelling.      Cervical back: Normal range of motion. No rigidity.      Right lower leg: No edema.      Left lower leg: No edema.      Comments: Tenderness at the site of chest tube placement   Skin:     Coloration: Skin is not jaundiced.   Neurological:      General: No focal deficit present.      Mental Status: She is alert and oriented to person, place, and time.   Psychiatric:         Mood and Affect: Mood normal.          Behavior: Behavior normal.                Significant Labs: All pertinent labs within the past 24 hours have been reviewed.    Significant Imaging: I have reviewed all pertinent imaging results/findings within the past 24 hours.

## 2024-12-07 NOTE — H&P
[facility]  [facility]  History & Physical    Patient Name: Ann-Marie Saavedra  MRN: [id number]  Patient Class: OP- Observation  Admission Date: 12/7/2024  Attending Physician: Je Constantino MD   Primary Care Provider: Elsi Klein MD         Patient information was obtained from patient, spouse/SO, and ER records.     Subjective:     Principal Problem:Secondary spontaneous pneumothorax    Chief Complaint:   Chief Complaint   Patient presents with    Chest Injury     Pt diagnosed with a pneumothorax, seen earlier this week for chest pain and SOB and sent here for a chest tube. 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       Of note, she has been having worsening shortness of breath for years, intermittently with chest pain that has limited her work. Mostly SOB with  activity that has limited her tennis/running and daily work. 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 She is currently being referred to advanced lung disease and lung transplant.     Seen by pulmonology in ED, chest tube placed in ED. Chest xray after chest tube placement shows adequate placement with interval decrease in pneumothorax without complete resolution. Being admitted to  for workup and management of PTX and hyperkalemia.     Past Medical History:   Diagnosis Date    Abnormal Pap smear 2013    no colpo    Keloid cicatrix     under right breast    Wound dehiscence in puerperium, perineal, delivered/postpartum 9/25/2015       Past Surgical History:   Procedure Laterality Date    CYST REMOVAL Right 2009    chest wall    FOOT TENDON SURGERY Bilateral 2008    bunion       Review of patient's allergies indicates:  No Known Allergies    No current  facility-administered medications on file prior to encounter.     Current Outpatient Medications on File Prior to Encounter   Medication Sig    doxycycline (ORACEA) 40 mg capsule Take 40 mg by mouth.    sirolimus (RAPAMUNE) 1 MG Tab Take 1 tablet (1 mg total) by mouth once daily.    albuterol (VENTOLIN HFA) 90 mcg/actuation inhaler Inhale 2 puffs into the lungs every 6 (six) hours as needed for Wheezing or Shortness of Breath. Rescue    azelastine (ASTELIN) 137 mcg (0.1 %) nasal spray 2 sprays (274 mcg total) by Nasal route 2 (two) times daily.    levocetirizine (XYZAL) 5 MG tablet Take 1 tablet (5 mg total) by mouth every evening.    multivitamin (THERAGRAN) per tablet Take 1 tablet by mouth once daily.     Family History       Problem Relation (Age of Onset)    Cancer Maternal Grandmother, Maternal Grandfather, Paternal Grandfather, Paternal Uncle    Colon cancer Maternal Grandfather, Paternal Grandfather (67)    Eczema Sister    Hyperlipidemia Mother    Lung cancer Maternal Grandmother    No Known Problems Father, Sister, Brother    Prostate cancer Paternal Uncle          Tobacco Use    Smoking status: Never    Smokeless tobacco: Never   Substance and Sexual Activity    Alcohol use: Yes     Alcohol/week: 5.0 standard drinks of alcohol     Types: 5 Glasses of wine per week    Drug use: No    Sexual activity: Yes     Partners: Male     Birth control/protection: None     Review of Systems   Constitutional:  Positive for activity change and fatigue. Negative for chills.   Respiratory:  Positive for chest tightness and shortness of breath.    Cardiovascular:  Positive for chest pain. Negative for palpitations and leg swelling.   Gastrointestinal:  Negative for abdominal distention and abdominal pain.   Musculoskeletal:  Negative for arthralgias and back pain.   Neurological:  Negative for syncope.   Psychiatric/Behavioral:  Negative for agitation and behavioral problems.      Objective:     Vital Signs (Most  Recent):  Temp: 98 °F (36.7 °C) (12/07/24 1012)  Pulse: 75 (12/07/24 1557)  Resp: 18 (12/07/24 1600)  BP: 113/64 (12/07/24 1557)  SpO2: 98 % (12/07/24 1557) Vital Signs (24h Range):  Temp:  [98 °F (36.7 °C)] 98 °F (36.7 °C)  Pulse:  [61-93] 75  Resp:  [16-32] 18  SpO2:  [95 %-100 %] 98 %  BP: ()/(56-78) 113/64     Weight: 54.4 kg (120 lb)  Body mass index is 19.37 kg/m².     Physical Exam  Constitutional:       Appearance: She is ill-appearing.   HENT:      Mouth/Throat:      Mouth: Mucous membranes are moist.      Pharynx: Oropharynx is clear.   Eyes:      Extraocular Movements: Extraocular movements intact.   Cardiovascular:      Rate and Rhythm: Normal rate and regular rhythm.   Pulmonary:      Effort: Respiratory distress present.      Breath sounds: Rhonchi present.      Comments: Mild respiratory distress from pain  Chest:      Chest wall: Tenderness present.   Abdominal:      General: Bowel sounds are normal. There is no distension.      Palpations: Abdomen is soft.      Tenderness: There is no abdominal tenderness.   Musculoskeletal:         General: Tenderness present. No swelling.      Cervical back: Normal range of motion. No rigidity.      Right lower leg: No edema.      Left lower leg: No edema.      Comments: Tenderness at the site of chest tube placement   Skin:     Coloration: Skin is not jaundiced.   Neurological:      General: No focal deficit present.      Mental Status: She is alert and oriented to person, place, and time.   Psychiatric:         Mood and Affect: Mood normal.         Behavior: Behavior normal.                Significant Labs: All pertinent labs within the past 24 hours have been reviewed.    Significant Imaging: I have reviewed all pertinent imaging results/findings within the past 24 hours.  Assessment/Plan:     * Secondary spontaneous pneumothorax  Iso lymphangioleiomyomatosis diagnosis  CXR/Ct chest on admission with PTX   S/p chest tube placement in ED  Pulmonology  consulted and managing CT  Supplemental O2 via NC  CXR at 1800 - if PTX not improving will get CT chest  Pain control: Oxycodone 5 q 6 PRN and dilaudid 1 q 4 PRN (start prophylactic stool softer)      Hyperkalemia  Hyperkalemia is likely due to  Unclear cause .The patients most recent potassium results are listed below.  Recent Labs     12/07/24  1151   K 5.6*     Plan  - Monitor for arrhythmias with EKG and/or continuous telemetry.   - Repeat K - if still elevated will treat .   - Monitor potassium: Every 12 hours            Lymphangioleiomyomatosis  Recent diagnosis   Being referred as outpatient to advanced lung disease and lung transplant  C/w sirolimus 1 mg daily  Pulm following    Rosacea  C/w home doxycycline daily        VTE Risk Mitigation (From admission, onward)           Ordered     enoxaparin injection 40 mg  Daily         12/07/24 1542     IP VTE LOW RISK PATIENT  Once         12/07/24 1542     Place sequential compression device  Until discontinued         12/07/24 1542                       On 12/07/2024, patient should be placed in hospital observation services under my care.             Je Constantino MD  Department of Hospital Medicine  [facility] - Emergency Dept

## 2024-12-07 NOTE — CONSULTS
[facility] Consult Note    Primary Attending Physician: Dr. Constantino  Consultant Attending: Dr. Zambrano  Consultant Fellow: Dr. Campbell     Reason for Consult:     Admitted with ptx, recently dx with HEBERT and is currently referred to advanced lung disease and transplant     Subjective:      History of Present Illness:  Ann-Marie Saavedra is a 44 y.o. female with h/o abnormal pap and progressive onset of SOB over the past 3 years that worsened over the past 6 months found to have likely HEBERT and pneumothorax on xray who presented to the [facility] ED on 12/7/2024 with a primary complaint of Chest Injury (Pt diagnosed with a pneumothorax, seen earlier this week for chest pain and SOB and sent here for a chest tube. Currently having same symptoms )      Per the patient, she had been feeling progressive SOB over the last few years which has gotten significantly worse. Seen by Dr. Herzog in pulmonary clinic on 12/3 with CT chest and PFTs done concerning for HEBERT as well as secondary spontaneous PTX. Initially, was feeling ok with plan for conservative management of PTX but then over the past 2 days has had worsening SOB so sent to the ED for eval and likely chest tube. She was also referred to the advanced lung clinic/transplant clinic here. No recent fevers or sick contacts. Started on sirolimus on Tuesday, 12/3.     In the ED, CXR with persistent R PTX so chest tube placed. Pulmonology consulted. The patient noted she was having some pain with inspiration but overall was feeling ok. Repeat CXR after chest tube placement with persistent PTX and pt with air leak.     Past Medical History:  Past Medical History:   Diagnosis Date    Abnormal Pap smear 2013    no colpo    Keloid cicatrix     under right breast    Wound dehiscence in puerperium, perineal, delivered/postpartum 9/25/2015       Past Surgical History:  Past Surgical History:   Procedure Laterality Date    CYST REMOVAL Right 2009    chest  wall    FOOT TENDON SURGERY Bilateral 2008    bunion       Allergies:  Review of patient's allergies indicates:  No Known Allergies    Medications:   In-Hospital Scheduled Medications:   azelastine  2 spray Nasal BID    [START ON 12/8/2024] doxycycline  50 mg Oral Daily    enoxparin  40 mg Subcutaneous Daily    senna  8.6 mg Oral Daily    [START ON 12/8/2024] sirolimus  1 mg Oral Daily      In-Hospital PRN Medications:    Current Facility-Administered Medications:     albuterol, 2 puff, Inhalation, Q6H PRN    dextrose 10%, 12.5 g, Intravenous, PRN    dextrose 10%, 25 g, Intravenous, PRN    glucagon (human recombinant), 1 mg, Intramuscular, PRN    glucose, 16 g, Oral, PRN    glucose, 24 g, Oral, PRN    HYDROmorphone, 1 mg, Intravenous, Q4H PRN    naloxone, 0.02 mg, Intravenous, PRN    oxyCODONE, 5 mg, Oral, Q6H PRN    sodium chloride 0.9%, 10 mL, Intravenous, Q12H PRN   In-Hospital IV Infusion Medications:     Home Medications:  Prior to Admission medications    Medication Sig Start Date End Date Taking? Authorizing Provider   doxycycline (ORACEA) 40 mg capsule Take 40 mg by mouth. 11/7/24  Yes Provider, Historical   sirolimus (RAPAMUNE) 1 MG Tab Take 1 tablet (1 mg total) by mouth once daily. 12/6/24 12/6/25 Yes Becky Herzog MD   albuterol (VENTOLIN HFA) 90 mcg/actuation inhaler Inhale 2 puffs into the lungs every 6 (six) hours as needed for Wheezing or Shortness of Breath. Rescue 11/27/24   Elsi Klein MD   azelastine (ASTELIN) 137 mcg (0.1 %) nasal spray 2 sprays (274 mcg total) by Nasal route 2 (two) times daily. 11/27/24 11/27/25  Elsi Klein MD   levocetirizine (XYZAL) 5 MG tablet Take 1 tablet (5 mg total) by mouth every evening. 11/27/24   Elsi Klein MD   multivitamin (THERAGRAN) per tablet Take 1 tablet by mouth once daily.    Provider, Historical       Family History:  Family History   Problem Relation Name Age of Onset    Hyperlipidemia Mother      No Known Problems Father      Eczema  Sister      No Known Problems Sister      No Known Problems Brother      Lung cancer Maternal Grandmother Polly Leandro     Cancer Maternal Grandmother Polly Leandro     Colon cancer Maternal Grandfather CONSTANZA Leandro     Cancer Maternal Grandfather SHOSHANACNain Reeves     Colon cancer Paternal Grandfather Hugo Glez 67    Cancer Paternal Grandfather Huog Glez     Prostate cancer Paternal Uncle Shady Glez     Cancer Paternal Uncle Shady Glez     Melanoma Neg Hx      Acne Neg Hx      Lupus Neg Hx      Psoriasis Neg Hx      Breast cancer Neg Hx      Ovarian cancer Neg Hx      Heart attack Neg Hx      Stroke Neg Hx      Diabetes Neg Hx      Osteoporosis Neg Hx         Social History:  Social History     Tobacco Use    Smoking status: Never    Smokeless tobacco: Never   Substance Use Topics    Alcohol use: Yes     Alcohol/week: 5.0 standard drinks of alcohol     Types: 5 Glasses of wine per week    Drug use: No       Review of Systems:  Pertinent items are noted in HPI. All other systems are reviewed and are negative.     Objective:   Last 24 Hour Vital Signs:  BP  Min: 98/57  Max: 128/72  Temp  Av °F (36.7 °C)  Min: 98 °F (36.7 °C)  Max: 98 °F (36.7 °C)  Pulse  Av  Min: 58  Max: 93  Resp  Av.5  Min: 11  Max: 32  SpO2  Av %  Min: 95 %  Max: 100 %  Weight  Av.4 kg (120 lb)  Min: 54.4 kg (120 lb)  Max: 54.4 kg (120 lb)  No intake/output data recorded.    Physical Examination:  Physical Exam  Vitals and nursing note reviewed.   Constitutional:       General: She is not in acute distress.     Comments: Able to speak in full sentences.    HENT:      Head: Normocephalic and atraumatic.      Nose: Nose normal.      Mouth/Throat:      Mouth: Mucous membranes are moist.   Eyes:      General:         Right eye: No discharge.         Left eye: No discharge.      Extraocular Movements: Extraocular movements intact.      Conjunctiva/sclera: Conjunctivae normal.   Cardiovascular:      Rate and  "Rhythm: Normal rate and regular rhythm.   Pulmonary:      Effort: Pulmonary effort is normal. No respiratory distress.      Comments: Dec breath sounds to R apex, otherwise clear b/l. Chest tube to suction with air leak.   Abdominal:      General: There is no distension.      Palpations: Abdomen is soft.   Musculoskeletal:      Right lower leg: No edema.      Left lower leg: No edema.   Skin:     General: Skin is warm and dry.   Neurological:      General: No focal deficit present.      Mental Status: She is alert and oriented to person, place, and time.           Laboratory:  Trended Lab Data:  Recent Labs     12/07/24  1151 12/07/24  1701   WBC 7.12  --    HGB 14.0  --    HCT 41.4  --      --      --    K 5.6* 4.1     --    CO2 24  --    BUN 14  --    CREATININE 0.8  --    GLU 94  --    BILITOT 0.5  --    AST 22  --    ALT 18  --    ALKPHOS 49  --    CALCIUM 9.6  --    ALBUMIN 4.3  --    PROT 7.1  --        Cardiac: No results for input(s): "TROPONINI", "CKTOTAL", "CKMB", "BNP" in the last 168 hours.    Urinalysis:   Lab Results   Component Value Date    LABURIN No significant growth 04/18/2017       Microbiology:  Microbiology Results (last 7 days)       Procedure Component Value Units Date/Time    Culture, Respiratory with Gram Stain [[id number]]     Order Status: No result Specimen: Sputum, Expectorated             Radiology:  12/3/24: CT chest without contrast:    1. Small right pneumothorax, not substantially changed when compared to 12/02/2023.  2. Innumerable thin walled cysts seen throughout both lungs.  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  3. Fluid attenuation lesion in the left upper quadrant, incompletely evaluated on this exam, but measuring up to 5.3 cm.  In light of the lung findings, this may represent a lymphangioleiomyoma.  A CT of the abdomen and pelvis contrast is recommended for further evaluation.       I have personally reviewed the above " labs and imaging.     Assessment:     Ann-Marie Saavedra is a 44 y.o. female with h/o abnormal pap and progressive onset of SOB over the past 3 years that worsened over the past 6 months found to have likely HEBERT and pneumothorax on xray who presented to the [facility] ED on 12/7/2024 with a primary complaint of SOB. Chest tube placed for PTX in the ED, persistent PTX to suction so will plan for CT chest with contrast and CT abd/pelvis with contrast while going to eval abdomen for possible lymphangioleiomyoma.      Plan:     #Secondary Spontaneous PTX, now s/p chest tube   #Likely Lymphangioleiomyomatosis   - Chest tube placed by ED, leave to suction tonight  - CT chest/abd/pelvis with contrast to eval placement of chest tube and abdomen for concern for possible lymphangioleiomyoma   - Continue to monitor closely   - Check sirolimus level in am   - Please call on call pulmonology fellow with any questions or concerns     Thank you for allowing us to participate in the care of this patient. Please contact me if you have any questions regarding this consult. Pulmonology will continue to monitor.     Angela Campbell MD  [facility] Fellow

## 2024-12-07 NOTE — ASSESSMENT & PLAN NOTE
Recent diagnosis   Being referred as outpatient to advanced lung disease and lung transplant  C/w sirolimus 1 mg daily  Pulm following

## 2024-12-07 NOTE — ED NOTES
Resting quietly in bed. Chest tube connected to  water seal suction,  respirations  wnl - unlabored- VS's stable.

## 2024-12-08 LAB
ALBUMIN SERPL BCP-MCNC: 3.7 G/DL (ref 3.5–5.2)
ALP SERPL-CCNC: 43 U/L (ref 40–150)
ALT SERPL W/O P-5'-P-CCNC: 15 U/L (ref 10–44)
ANION GAP SERPL CALC-SCNC: 8 MMOL/L (ref 8–16)
AST SERPL-CCNC: 20 U/L (ref 10–40)
BASOPHILS # BLD AUTO: 0.03 K/UL (ref 0–0.2)
BASOPHILS NFR BLD: 0.5 % (ref 0–1.9)
BILIRUB SERPL-MCNC: 0.6 MG/DL (ref 0.1–1)
BUN SERPL-MCNC: 11 MG/DL (ref 6–20)
CALCIUM SERPL-MCNC: 8.6 MG/DL (ref 8.7–10.5)
CHLORIDE SERPL-SCNC: 107 MMOL/L (ref 95–110)
CO2 SERPL-SCNC: 20 MMOL/L (ref 23–29)
CREAT SERPL-MCNC: 0.8 MG/DL (ref 0.5–1.4)
DIFFERENTIAL METHOD BLD: ABNORMAL
EOSINOPHIL # BLD AUTO: 0.2 K/UL (ref 0–0.5)
EOSINOPHIL NFR BLD: 3.3 % (ref 0–8)
ERYTHROCYTE [DISTWIDTH] IN BLOOD BY AUTOMATED COUNT: 12.4 % (ref 11.5–14.5)
EST. GFR  (NO RACE VARIABLE): >60 ML/MIN/1.73 M^2
GLUCOSE SERPL-MCNC: 86 MG/DL (ref 70–110)
HCT VFR BLD AUTO: 38.9 % (ref 37–48.5)
HGB BLD-MCNC: 12.9 G/DL (ref 12–16)
IMM GRANULOCYTES # BLD AUTO: 0.05 K/UL (ref 0–0.04)
IMM GRANULOCYTES NFR BLD AUTO: 0.8 % (ref 0–0.5)
LYMPHOCYTES # BLD AUTO: 1.6 K/UL (ref 1–4.8)
LYMPHOCYTES NFR BLD: 26.3 % (ref 18–48)
MAGNESIUM SERPL-MCNC: 1.8 MG/DL (ref 1.6–2.6)
MCH RBC QN AUTO: 30.4 PG (ref 27–31)
MCHC RBC AUTO-ENTMCNC: 33.2 G/DL (ref 32–36)
MCV RBC AUTO: 92 FL (ref 82–98)
MONOCYTES # BLD AUTO: 0.5 K/UL (ref 0.3–1)
MONOCYTES NFR BLD: 8.5 % (ref 4–15)
NEUTROPHILS # BLD AUTO: 3.7 K/UL (ref 1.8–7.7)
NEUTROPHILS NFR BLD: 60.6 % (ref 38–73)
NRBC BLD-RTO: 0 /100 WBC
OHS QRS DURATION: 74 MS
OHS QTC CALCULATION: 413 MS
PHOSPHATE SERPL-MCNC: 4.1 MG/DL (ref 2.7–4.5)
PLATELET # BLD AUTO: 198 K/UL (ref 150–450)
PMV BLD AUTO: 10.3 FL (ref 9.2–12.9)
POTASSIUM SERPL-SCNC: 4.7 MMOL/L (ref 3.5–5.1)
PROT SERPL-MCNC: 6 G/DL (ref 6–8.4)
RBC # BLD AUTO: 4.25 M/UL (ref 4–5.4)
SIROLIMUS BLD-MCNC: 2.9 NG/ML (ref 4–20)
SIROLIMUS BLD-MCNC: 6.4 NG/ML (ref 4–20)
SODIUM SERPL-SCNC: 135 MMOL/L (ref 136–145)
WBC # BLD AUTO: 6.09 K/UL (ref 3.9–12.7)

## 2024-12-08 PROCEDURE — 36415 COLL VENOUS BLD VENIPUNCTURE: CPT | Mod: NTX | Performed by: STUDENT IN AN ORGANIZED HEALTH CARE EDUCATION/TRAINING PROGRAM

## 2024-12-08 PROCEDURE — 80053 COMPREHEN METABOLIC PANEL: CPT | Mod: NTX | Performed by: STUDENT IN AN ORGANIZED HEALTH CARE EDUCATION/TRAINING PROGRAM

## 2024-12-08 PROCEDURE — 99223 1ST HOSP IP/OBS HIGH 75: CPT | Mod: NTX,,, | Performed by: STUDENT IN AN ORGANIZED HEALTH CARE EDUCATION/TRAINING PROGRAM

## 2024-12-08 PROCEDURE — 80195 ASSAY OF SIROLIMUS: CPT | Mod: NTX | Performed by: STUDENT IN AN ORGANIZED HEALTH CARE EDUCATION/TRAINING PROGRAM

## 2024-12-08 PROCEDURE — 85025 COMPLETE CBC W/AUTO DIFF WBC: CPT | Mod: NTX | Performed by: STUDENT IN AN ORGANIZED HEALTH CARE EDUCATION/TRAINING PROGRAM

## 2024-12-08 PROCEDURE — 99232 SBSQ HOSP IP/OBS MODERATE 35: CPT | Mod: NTX,,, | Performed by: INTERNAL MEDICINE

## 2024-12-08 PROCEDURE — 21400001 HC TELEMETRY ROOM: Mod: NTX

## 2024-12-08 PROCEDURE — 84100 ASSAY OF PHOSPHORUS: CPT | Mod: NTX | Performed by: STUDENT IN AN ORGANIZED HEALTH CARE EDUCATION/TRAINING PROGRAM

## 2024-12-08 PROCEDURE — 63600175 PHARM REV CODE 636 W HCPCS: Mod: NTX | Performed by: STUDENT IN AN ORGANIZED HEALTH CARE EDUCATION/TRAINING PROGRAM

## 2024-12-08 PROCEDURE — 83735 ASSAY OF MAGNESIUM: CPT | Mod: NTX | Performed by: STUDENT IN AN ORGANIZED HEALTH CARE EDUCATION/TRAINING PROGRAM

## 2024-12-08 RX ADMIN — HYDROMORPHONE HYDROCHLORIDE 1 MG: 1 INJECTION, SOLUTION INTRAMUSCULAR; INTRAVENOUS; SUBCUTANEOUS at 04:12

## 2024-12-08 RX ADMIN — ENOXAPARIN SODIUM 40 MG: 40 INJECTION SUBCUTANEOUS at 05:12

## 2024-12-08 NOTE — PROGRESS NOTES
[facility] Medicine  Progress note    Team: [facility] A Kendy Fallon MD  Admit Date: 12/7/2024  Code status: Full Code    Principal Problem:  Secondary spontaneous pneumothorax    Interval hx:  No new complaints    PEx  Temp:  [97.6 °F (36.4 °C)-98.5 °F (36.9 °C)]   Pulse:  [47-93]   Resp:  [11-20]   BP: ()/(56-78)   SpO2:  [91 %-100 %]     Intake/Output Summary (Last 24 hours) at 12/8/2024 1332  Last data filed at 12/8/2024 1151  Gross per 24 hour   Intake 360 ml   Output 1200 ml   Net -840 ml       General Appearance: no acute distress, WDWN  Heart: regular rate and rhythm, no heave  Respiratory: Normal respiratory effort, symmetric excursion, bilateral vesicular breath sounds ; decreased breath sounds on right sided  Abdomen: Soft, non-tender; bowel sounds active  Skin: intact, no rash, no ulcers  Neurologic:  No focal numbness or weakness  Mental status: Alert, oriented x 4, affect appropriate    Recent Labs   Lab 12/02/24  1500 12/07/24  1151 12/08/24  0436   WBC 8.11 7.12 6.09   HGB 14.6 14.0 12.9   HCT 42.8 41.4 38.9    231 198     Recent Labs   Lab 12/02/24  1500 12/07/24  1151 12/07/24  1701 12/08/24  0436    139  --  135*   K 3.6 5.6* 4.1 4.7    108  --  107   CO2 22* 24  --  20*   BUN 12 14  --  11   CREATININE 0.8 0.8  --  0.8   GLU 85 94  --  86   CALCIUM 9.6 9.6  --  8.6*   MG  --   --   --  1.8   PHOS  --   --   --  4.1     Recent Labs   Lab 12/02/24  1500 12/07/24  1151 12/08/24  0436   ALKPHOS 53 49 43   ALT 17 18 15   AST 22 22 20   ALBUMIN 4.6 4.3 3.7   PROT 7.2 7.1 6.0   BILITOT 0.9 0.5 0.6      Scheduled Meds:   azelastine  2 spray Nasal BID    doxycycline  50 mg Oral Daily    enoxparin  40 mg Subcutaneous Daily    senna  8.6 mg Oral Daily    sirolimus  1 mg Oral Daily     Continuous Infusions:  As Needed:    Current Facility-Administered Medications:     albuterol, 2 puff, Inhalation, Q6H PRN    dextrose 10%, 12.5 g, Intravenous, PRN    dextrose 10%, 25 g,  Intravenous, PRN    glucagon (human recombinant), 1 mg, Intramuscular, PRN    glucose, 16 g, Oral, PRN    glucose, 24 g, Oral, PRN    HYDROmorphone, 1 mg, Intravenous, Q4H PRN    naloxone, 0.02 mg, Intravenous, PRN    oxyCODONE, 5 mg, Oral, Q6H PRN    sodium chloride 0.9%, 10 mL, Intravenous, Q12H PRN    Assessment and Plan  / Problems managed today    * Secondary spontaneous pneumothorax  Iso lymphangioleiomyomatosis diagnosis  CXR/Ct chest on admission with PTX   S/p chest tube placement in ED  Pulmonology consulted and managing CT  Supplemental O2 via NC  CXR at 1800 - if PTX not improving will get CT chest  Pain control: Oxycodone 5 q 6 PRN and dilaudid 1 q 4 PRN (start prophylactic stool softer)  PTX improved but not resolved and with persistent air leak  CT surgery consulted for assistance in management    Hyperkalemia  Hyperkalemia is likely due to  Unclear cause .The patients most recent potassium results are listed below.  Recent Labs     12/07/24  1151 12/07/24  1701 12/08/24  0436   K 5.6* 4.1 4.7       Plan  - Monitor for arrhythmias with EKG and/or continuous telemetry.   - Repeat K - if still elevated will treat .   - Monitor potassium: Every 12 hours    Lymphangioleiomyomatosis  Recent diagnosis   Being referred as outpatient to advanced lung disease and lung transplant  C/w sirolimus 1 mg daily  Pulm following    Rosacea  C/w home doxycycline daily        Diet:  regular  GI PPx: not needed  DVT PPx:  enoxaparin  Airways: room air  Drain: chest tube    Goals of Care:  Return to prior functional status     Discharge Planning   DAVID:    Is the patient medically ready for discharge?:     Reason for patient still in hospital (select all that apply): Patient trending condition, Treatment, and Consult recommendations  Discharge Plan A: Home, Home with family        Kendy Fallon MD

## 2024-12-08 NOTE — PROGRESS NOTES
[facility] Progress Note    Subjective:      Overnight, no acute events. CT chest/abd/pelvis done. This morning, doing better from pain standpoint. Chest tube with persistent air leak. Pt on RA in no distress.      Objective:     Last 24 Hour Vital Signs:  BP  Min: 97/62  Max: 128/72  Temp  Av.9 °F (36.6 °C)  Min: 97.6 °F (36.4 °C)  Max: 98 °F (36.7 °C)  Pulse  Av.1  Min: 47  Max: 93  Resp  Av.7  Min: 11  Max: 32  SpO2  Av.1 %  Min: 95 %  Max: 100 %  Weight  Av.4 kg (120 lb)  Min: 54.4 kg (120 lb)  Max: 54.4 kg (120 lb)  I/O last 3 completed shifts:  In: 120 [P.O.:120]  Out: 700 [Urine:700]    Physical Examination:  Physical Exam  Vitals and nursing note reviewed.   Constitutional:       General: She is not in acute distress.     Comments: Sitting up in bed, able to answer questions in full sentences without issue.    HENT:      Head: Normocephalic and atraumatic.      Nose: Nose normal.      Mouth/Throat:      Mouth: Mucous membranes are moist.   Cardiovascular:      Rate and Rhythm: Normal rate and regular rhythm.   Pulmonary:      Effort: Pulmonary effort is normal. No respiratory distress.      Breath sounds: No wheezing.      Comments: Dec breath sounds at R apex, otherwise clear to auscultation b/l. Chest tube site dressing c/d/I.  Skin:     General: Skin is warm and dry.      Capillary Refill: Capillary refill takes less than 2 seconds.   Neurological:      General: No focal deficit present.      Mental Status: She is alert and oriented to person, place, and time. Mental status is at baseline.            Laboratory:  Trended Lab Data:  Recent Labs     24  1151 24  1701 24  0436   WBC 7.12  --  6.09   HGB 14.0  --  12.9   HCT 41.4  --  38.9     --  198     --  135*   K 5.6* 4.1 4.7     --  107   CO2 24  --  20*   BUN 14  --  11   CREATININE 0.8  --  0.8   GLU 94  --  86   BILITOT 0.5  --  0.6   AST 22  --  20  "  ALT 18  --  15   ALKPHOS 49  --  43   CALCIUM 9.6  --  8.6*   ALBUMIN 4.3  --  3.7   PROT 7.1  --  6.0   MG  --   --  1.8   PHOS  --   --  4.1       Cardiac: No results for input(s): "TROPONINI", "CKTOTAL", "CKMB", "BNP" in the last 168 hours.    Urinalysis:   Lab Results   Component Value Date    LABURIN No significant growth 04/18/2017       Microbiology:  Microbiology Results (last 7 days)       Procedure Component Value Units Date/Time    Culture, Respiratory with Gram Stain [[id number]]     Order Status: No result Specimen: Sputum, Expectorated             Radiology:  12/7/24: CT Chest/Abd/Pelvis with contrast:   Impression:  Findings suggestive of lymphangiomyomatosis with worsening pleural based nodularity and parenchymal opacities in the right middle lobe, likely infectious/inflammatory.  Suggest correlation with symptoms.  Moderate right-sided pneumothorax, mildly larger when compared with prior CT chest, despite presence of right-sided chest tube.  Suggest correlation with chest tube function.  Trace bilateral pleural fluid.  Retroperitoneal lymphadenopathy.  Decreased size of previously described soft tissue/collection in left upper quadrant of the abdomen, noting that lymphangioleiomyomas may be hormone responsive and wax and wane through the menstrual cycle.  Additional findings discussed in the body of the report.    12/3/24: CT chest without contrast:    1. Small right pneumothorax, not substantially changed when compared to 12/02/2023.  2. Innumerable thin walled cysts seen throughout both lungs.  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  3. Fluid attenuation lesion in the left upper quadrant, incompletely evaluated on this exam, but measuring up to 5.3 cm.  In light of the lung findings, this may represent a lymphangioleiomyoma.  A CT of the abdomen and pelvis contrast is recommended for further evaluation.    I have personally reviewed the above labs and " imaging.    Assessment:     Ann-Marie Saavedra is a 44 y.o.female with h/o abnormal pap and progressive onset of SOB over the past 3 years that worsened over the past 6 months found to have likely HEBERT and pneumothorax on xray who presented to the [facility] ED on 12/7/2024 with a primary complaint of SOB. Chest tube placed for PTX in the ED, persistent PTX on CXR. CT chest last night with chest tube in communication with PTX although persistent decent size ptx. Chest tube with persistent air leak, plan to leave to suction and evaluate again tomorrow.      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   - Chest tube placed by ED, continue on suction for now   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, persistent decent size of PTX despite air leak  - VEGF lab sent 12/3, pending/in process   - Went over imaging and possibilities going forward with the patient and parents at bedside today  - Discussed with thoracic surgery today regarding possible further interventions   - Involving advanced lung disease/lung transplant consult tomorrow to continue discussions with multidisciplinary group   - Leave chest tube to suction   - Please call with any acute changes in respiratory status   - Pt ok to be up and out of bed   - Plan to allow for more time for possible PTX resolution with chest tube as discussed with patient and family today with re-evaluation tomorrow   - Consult lung transplant team in am - will need further multidisciplinary discussions regarding best next steps with coordination between thoracic surgery and lung transplant/advanced lung disease teams     Thank you for allowing us to participate in the care of this patient, we will continue to follow. Please let us know if there are questions or concerns.      Angela Campbell MD  [facility] Fellow

## 2024-12-08 NOTE — PLAN OF CARE
[facility] - Emergency Dept  Initial Discharge Assessment       Primary Care Provider: Elsi Klein MD    Admission Diagnosis: Chest pain [R07.9]    Admission Date: 12/7/2024  Expected Discharge Date:     Transition of Care Barriers: (P) None    Payor: BLUE CROSS BLUE SHIELD / Plan: [place] HMO / Product Type: HMO /     Extended Emergency Contact Information  Primary Emergency Contact: LinnetteFred yan  Address: [address] [place] [address]           [place] [address] [facility]  Home Phone: [phone]  Relation: Spouse    Discharge Plan A: (P) Home, Home with family  Discharge Plan B: (P) Home, Home with family      [id number] - [place] - [address] AT [facility] [address] [facility]  [address]  [place]   [place] [address]  Phone: [phone] Fax: [phone]    [id number] - [place] - [address]  [address]  [place] [address]  Phone: [phone] Fax: [phone]      Initial Assessment (most recent)       Adult Discharge Assessment - 12/07/24 2043          Discharge Assessment    Assessment Type Discharge Planning Assessment (P)      Confirmed/corrected address, phone number and insurance Yes (P)      Confirmed Demographics Correct on Facesheet (P)      Source of Information patient (P)      When was your last doctors appointment? -- (P)    Last Wednesday    Does patient/caregiver understand observation status Yes (P)      Communicated DAVID with patient/caregiver Yes (P)      Reason For Admission partially collapsed lung (P)      People in Home spouse;child(jc), dependent (P)      Facility Arrived From: Home (P)      Do you expect to return to your current living situation? Yes (P)      Do you have help at home or someone to help you manage your care at home? No (P)      Prior to hospitilization cognitive status: Alert/Oriented (P)      Current cognitive status: Alert/Oriented (P)      Walking or Climbing Stairs Difficulty no (P)       Dressing/Bathing Difficulty no (P)      Home Accessibility not wheelchair accessible (P)      Home Layout Able to live on 1st floor (P)      Equipment Currently Used at Home none (P)      Readmission within 30 days? No (P)      Patient currently being followed by outpatient case management? No (P)      Do you currently have service(s) that help you manage your care at home? No (P)      Do you take prescription medications? Yes (P)      Do you have prescription coverage? Yes (P)      Coverage BCBS (P)      Do you have any problems affording any of your prescribed medications? No (P)      Is the patient taking medications as prescribed? yes (P)      Who is going to help you get home at discharge?  (P)      How do you get to doctors appointments? car, drives self (P)      Are you on dialysis? No (P)      Do you take coumadin? No (P)      Discharge Plan A Home;Home with family (P)      Discharge Plan B Home;Home with family (P)      DME Needed Upon Discharge  none (P)      Discharge Plan discussed with: Patient (P)      Transition of Care Barriers None (P)         Physical Activity    On average, how many days per week do you engage in moderate to strenuous exercise (like a brisk walk)? 4 days (P)      On average, how many minutes do you engage in exercise at this level? 40 min (P)         Financial Resource Strain    How hard is it for you to pay for the very basics like food, housing, medical care, and heating? Not hard at all (P)         Housing Stability    In the last 12 months, was there a time when you were not able to pay the mortgage or rent on time? No (P)      At any time in the past 12 months, were you homeless or living in a shelter (including now)? No (P)         Transportation Needs    Has the lack of transportation kept you from medical appointments, meetings, work or from getting things needed for daily living? No (P)         Food Insecurity    Within the past 12 months, you worried that your  food would run out before you got the money to buy more. Never true (P)      Within the past 12 months, the food you bought just didn't last and you didn't have money to get more. Never true (P)         Stress    Do you feel stress - tense, restless, nervous, or anxious, or unable to sleep at night because your mind is troubled all the time - these days? To some extent (P)         Social Isolation    How often do you feel lonely or isolated from those around you?  Never (P)         Alcohol Use    Q1: How often do you have a drink containing alcohol? 2-4 times a month (P)      Q2: How many drinks containing alcohol do you have on a typical day when you are drinking? 1 or 2 (P)      Q3: How often do you have six or more drinks on one occasion? Never (P)         Utilities    In the past 12 months has the electric, gas, oil, or water company threatened to shut off services in your home? No (P)         Health Literacy    How often do you need to have someone help you when you read instructions, pamphlets, or other written material from your doctor or pharmacy? Never (P)         OTHER    Name(s) of People in Home  and children (P)                                  2.5

## 2024-12-08 NOTE — PLAN OF CARE
Problem: Respiratory Compromise (Pneumothorax)  Goal: Optimal Oxygenation and Ventilation  12/8/2024 1513 by Kaya Boucher RN  Outcome: Progressing  12/8/2024 0942 by Kaya Boucher RN  Outcome: Progressing     Problem: Adult Inpatient Plan of Care  Goal: Plan of Care Review  Outcome: Progressing  Goal: Patient-Specific Goal (Individualized)  Outcome: Progressing  Goal: Absence of Hospital-Acquired Illness or Injury  Outcome: Progressing  Goal: Optimal Comfort and Wellbeing  Outcome: Progressing  Goal: Readiness for Transition of Care  Outcome: Progressing

## 2024-12-08 NOTE — HPI
Ann-Marie Saavedra is a 44 y.o. 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 She underwent placement of pigtail chest tube in the ED. Thoracic surgery was consulted for persistent air leak.    Today she reports improvement in dyspnea and chest pain since placement of the chest tube. She is on 2L by NC. She is on Sirolimus, which was started on 12/3.

## 2024-12-08 NOTE — CONSULTS
[facility] - Internal Medicine Telemetry  Thoracic Surgery  Consult Note    Patient Name: Ann-Marie Saavedra  MRN: [id number]  Code Status: Full Code  Admission Date: 12/7/2024  Hospital Length of Stay: 0 days  Consult Requesting Physician: Angela Campbell  Consulting Physician: Chad Diggs  Primary Care Provider: Elsi Klein MD    Inpatient consult to Cardiothoracic Surgery  Consult performed by: Chad Diggs MD  Consult ordered by: Angela Campbell MD        Subjective:     Reason for Consult: Air leak following chest tube    History of Present Illness: Ann-Marie Saavedra is a 44 y.o. 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 She underwent placement of pigtail chest tube in the ED. Thoracic surgery was consulted for persistent air leak.    Today she reports improvement in dyspnea and chest pain since placement of the chest tube. She is on 2L by NC. She is on Sirolimus, which was started on 12/3.     No current facility-administered medications on file prior to encounter.     Current Outpatient Medications on File Prior to Encounter   Medication Sig    doxycycline (ORACEA) 40 mg capsule Take 40 mg by mouth.    sirolimus (RAPAMUNE) 1 MG Tab Take 1 tablet (1 mg total) by mouth once daily.    albuterol (VENTOLIN HFA) 90 mcg/actuation inhaler Inhale 2 puffs into the lungs every 6 (six) hours as needed for Wheezing or Shortness of Breath. Rescue    azelastine (ASTELIN) 137 mcg (0.1 %) nasal spray 2 sprays (274 mcg total) by Nasal route 2 (two) times daily.    levocetirizine (XYZAL) 5 MG tablet Take 1 tablet (5 mg total) by mouth every evening.    multivitamin (THERAGRAN) per tablet Take 1 tablet by mouth once daily.       Review of patient's allergies indicates:  No Known Allergies    Past Medical History:   Diagnosis Date    Abnormal Pap smear  2013    no colpo    Keloid cicatrix     under right breast    Wound dehiscence in puerperium, perineal, delivered/postpartum 9/25/2015     Past Surgical History:   Procedure Laterality Date    CYST REMOVAL Right 2009    chest wall    FOOT TENDON SURGERY Bilateral 2008    bunion     Family History       Problem Relation (Age of Onset)    Cancer Maternal Grandmother, Maternal Grandfather, Paternal Grandfather, Paternal Uncle    Colon cancer Maternal Grandfather, Paternal Grandfather (67)    Eczema Sister    Hyperlipidemia Mother    Lung cancer Maternal Grandmother    No Known Problems Father, Sister, Brother    Prostate cancer Paternal Uncle          Tobacco Use    Smoking status: Never    Smokeless tobacco: Never   Substance and Sexual Activity    Alcohol use: Yes     Alcohol/week: 5.0 standard drinks of alcohol     Types: 5 Glasses of wine per week    Drug use: No    Sexual activity: Yes     Partners: Male     Birth control/protection: None     Review of Systems   Constitutional:  Negative for chills and fever.   Respiratory:  Positive for shortness of breath.    Cardiovascular:  Negative for chest pain.   Gastrointestinal:  Negative for abdominal pain.   Neurological:  Negative for seizures and syncope.     Objective:     Vital Signs (Most Recent):  Temp: 98.5 °F (36.9 °C) (12/08/24 1147)  Pulse: 64 (12/08/24 1147)  Resp: 17 (12/08/24 1147)  BP: (!) 92/57 (12/08/24 1147)  SpO2: 97 % (12/08/24 1147) Vital Signs (24h Range):  Temp:  [97.6 °F (36.4 °C)-98.5 °F (36.9 °C)] 98.5 °F (36.9 °C)  Pulse:  [47-93] 64  Resp:  [11-32] 17  SpO2:  [91 %-100 %] 97 %  BP: ()/(56-78) 92/57     Weight: 54.4 kg (120 lb)  Body mass index is 19.37 kg/m².    Intake/Output - Last 3 Shifts         12/06 0700 12/07 0659 12/07 0700 12/08 0659 12/08 0700 12/09 0659    P.O.  120 240    Total Intake(mL/kg)  120 (2.2) 240 (4.4)    Urine (mL/kg/hr)  700 500 (1.9)    Total Output  700 500    Net  -580 -260           Urine Occurrence  1 x              SpO2: 97 %        Physical Exam  Vitals reviewed.   Constitutional:       General: She is not in acute distress.     Appearance: She is not toxic-appearing.   HENT:      Head: Normocephalic and atraumatic.   Eyes:      Extraocular Movements: Extraocular movements intact.   Cardiovascular:      Rate and Rhythm: Normal rate.   Pulmonary:      Effort: Pulmonary effort is normal. No respiratory distress.      Comments:   On 2 L by NC  CT to suction with near continuous air leak  Skin:     General: Skin is warm and dry.   Neurological:      General: No focal deficit present.      Mental Status: She is alert.            Significant Labs:  All pertinent labs from the last 24 hours have been reviewed.    Significant Diagnostics:  I have reviewed all pertinent imaging results/findings within the past 24 hours.    VTE Risk Mitigation (From admission, onward)           Ordered     enoxaparin injection 40 mg  Daily         12/07/24 1542     IP VTE LOW RISK PATIENT  Once         12/07/24 1542     Place sequential compression device  Until discontinued         12/07/24 1542                    Assessment/Plan:     Lymphangioleiomyomatosis  Ann-Marie Saavedra is a 44 y.o. female with recently diagnosed lymphangioleiomyomatosis c/b pneumothorax who was admitted following chest tube placement by ED staff on 12/7/24. Thoracic surgery was consulted for evaluation of ongoing air leak. The size of the pneumothorax has decreased on interval imaging and the patient reports symptomatic improvement since placement.     -Continue chest tube to suction  -Daily chest x-ray  -Per discussion with pulmonary team, patient to be evaluated by transplant team tomorrow  -Will await their evaluation prior to consideration of pleurodesis as this may impact transplant status    Thoracic surgery to continue to follow          Thank you for your consult. I will follow-up with patient. Please contact us if you have any additional  questions.    Chad Diggs MD  Thoracic Surgery  [facility] - Internal Medicine Telemetry

## 2024-12-08 NOTE — ED NOTES
Pt has returned to baseline, taking oral fluids w/ out difficulty. VS's stable . Remains on O2 per order.

## 2024-12-08 NOTE — ED NOTES
Report received from Renetta CURRY RN. Assume care of pt. Pt resting comfortably and independently repositioned in stretcher with bed locked in lowest position for safety. NAD noted at this time. Respirations even and unlabored and visible chest rise noted.  Patient offered bathroom assistance and denies need at this time. Pt instructed to call if assistance is needed. Pt on continuous cardiac, BP, and O2 monitoring. Call light within reach. Family at bedside. No needs at this time. Will continue to monitor.          APPEARANCE: awake and alert in NAD. 6/10 pain.   SKIN: warm, dry and intact. No breakdown or bruising.  MUSCULOSKELETAL: Patient moving all extremities spontaneously, no obvious swelling or deformities noted. Ambulates independently.  RESPIRATORY: + SOB, 2L NC, + chest tube R side   CARDIAC: Denies CP, 2+ distal pulses; no peripheral edema  ABDOMEN: S/ND/NT, Denies nausea   : voids spontaneously, denies difficulty  Neurologic: AAO x 4; follows commands equal strength in all extremities; denies numbness/tingling. Denies dizziness, KEDAR, pupils

## 2024-12-08 NOTE — SUBJECTIVE & OBJECTIVE
No current facility-administered medications on file prior to encounter.     Current Outpatient Medications on File Prior to Encounter   Medication Sig    doxycycline (ORACEA) 40 mg capsule Take 40 mg by mouth.    sirolimus (RAPAMUNE) 1 MG Tab Take 1 tablet (1 mg total) by mouth once daily.    albuterol (VENTOLIN HFA) 90 mcg/actuation inhaler Inhale 2 puffs into the lungs every 6 (six) hours as needed for Wheezing or Shortness of Breath. Rescue    azelastine (ASTELIN) 137 mcg (0.1 %) nasal spray 2 sprays (274 mcg total) by Nasal route 2 (two) times daily.    levocetirizine (XYZAL) 5 MG tablet Take 1 tablet (5 mg total) by mouth every evening.    multivitamin (THERAGRAN) per tablet Take 1 tablet by mouth once daily.       Review of patient's allergies indicates:  No Known Allergies    Past Medical History:   Diagnosis Date    Abnormal Pap smear 2013    no colpo    Keloid cicatrix     under right breast    Wound dehiscence in puerperium, perineal, delivered/postpartum 9/25/2015     Past Surgical History:   Procedure Laterality Date    CYST REMOVAL Right 2009    chest wall    FOOT TENDON SURGERY Bilateral 2008    bunion     Family History       Problem Relation (Age of Onset)    Cancer Maternal Grandmother, Maternal Grandfather, Paternal Grandfather, Paternal Uncle    Colon cancer Maternal Grandfather, Paternal Grandfather (67)    Eczema Sister    Hyperlipidemia Mother    Lung cancer Maternal Grandmother    No Known Problems Father, Sister, Brother    Prostate cancer Paternal Uncle          Tobacco Use    Smoking status: Never    Smokeless tobacco: Never   Substance and Sexual Activity    Alcohol use: Yes     Alcohol/week: 5.0 standard drinks of alcohol     Types: 5 Glasses of wine per week    Drug use: No    Sexual activity: Yes     Partners: Male     Birth control/protection: None     Review of Systems   Constitutional:  Negative for chills and fever.   Respiratory:  Positive for shortness of breath.     Cardiovascular:  Negative for chest pain.   Gastrointestinal:  Negative for abdominal pain.   Neurological:  Negative for seizures and syncope.     Objective:     Vital Signs (Most Recent):  Temp: 98.5 °F (36.9 °C) (12/08/24 1147)  Pulse: 64 (12/08/24 1147)  Resp: 17 (12/08/24 1147)  BP: (!) 92/57 (12/08/24 1147)  SpO2: 97 % (12/08/24 1147) Vital Signs (24h Range):  Temp:  [97.6 °F (36.4 °C)-98.5 °F (36.9 °C)] 98.5 °F (36.9 °C)  Pulse:  [47-93] 64  Resp:  [11-32] 17  SpO2:  [91 %-100 %] 97 %  BP: ()/(56-78) 92/57     Weight: 54.4 kg (120 lb)  Body mass index is 19.37 kg/m².    Intake/Output - Last 3 Shifts         12/06 0700  12/07 0659 12/07 0700 12/08 0659 12/08 0700 12/09 0659    P.O.  120 240    Total Intake(mL/kg)  120 (2.2) 240 (4.4)    Urine (mL/kg/hr)  700 500 (1.9)    Total Output  700 500    Net  -580 -260           Urine Occurrence  1 x             SpO2: 97 %        Physical Exam  Vitals reviewed.   Constitutional:       General: She is not in acute distress.     Appearance: She is not toxic-appearing.   HENT:      Head: Normocephalic and atraumatic.   Eyes:      Extraocular Movements: Extraocular movements intact.   Cardiovascular:      Rate and Rhythm: Normal rate.   Pulmonary:      Effort: Pulmonary effort is normal. No respiratory distress.      Comments:   On 2 L by NC  CT to suction with near continuous air leak  Skin:     General: Skin is warm and dry.   Neurological:      General: No focal deficit present.      Mental Status: She is alert.            Significant Labs:  All pertinent labs from the last 24 hours have been reviewed.    Significant Diagnostics:  I have reviewed all pertinent imaging results/findings within the past 24 hours.    VTE Risk Mitigation (From admission, onward)           Ordered     enoxaparin injection 40 mg  Daily         12/07/24 1542     IP VTE LOW RISK PATIENT  Once         12/07/24 1542     Place sequential compression device  Until discontinued          12/07/24 1543

## 2024-12-08 NOTE — ASSESSMENT & PLAN NOTE
Hyperkalemia is likely due to  Unclear cause .The patients most recent potassium results are listed below.  Recent Labs     12/09/24  0238 12/10/24  0236   K 3.9 3.8     Plan  - Monitor for arrhythmias with EKG and/or continuous telemetry.   - Repeat K - if still elevated will treat .   - Monitor potassium: Every 12 hours

## 2024-12-08 NOTE — ASSESSMENT & PLAN NOTE
Ann-Marie Saavedra is a 44 y.o. female with recently diagnosed lymphangioleiomyomatosis c/b pneumothorax who was admitted following chest tube placement by ED staff on 12/7/24. Thoracic surgery was consulted for evaluation of ongoing air leak. The size of the pneumothorax has decreased on interval imaging and the patient reports symptomatic improvement since placement.     -Continue chest tube to suction  -Daily chest x-ray  -Per discussion with pulmonary team, patient to be evaluated by transplant team tomorrow  -Will await their evaluation prior to consideration of pleurodesis as this may impact transplant status    Thoracic surgery to continue to follow

## 2024-12-08 NOTE — ED NOTES
Bedside report to ALFRED Jerez - . No drainage noted from chest tube.  Dressing remains d/I and occlusive. Resp unlabored and even , VS's stable.

## 2024-12-08 NOTE — PROGRESS NOTES
12/07/24 2330   Vital Signs   Temp 97.6 °F (36.4 °C)   Temp Source Oral   Pulse (!) 56   Heart Rate Source Monitor   Resp 18   SpO2 98 %   /71   MAP (mmHg) 87   BP Location Left arm   Patient Position Lying     Pt arrived to floor via stretcher. Pt transferred self to bed . Pt AAOx4. Pt denied any pain or discomfort. Pt has chest tube to rt lung. Suction was hooked up and placed on continuous suction . Chest tube tape to floor and occlusive dressing placed to wall for accidental tube dislodgement.   Pt on O2 at 2L via N/c. VSS. Pt did vomit x1 post ct with contrast. Pt explained she felt better after. POC reviewed with pt and sister. Pt oriented to call light floor and room . Bed in lowest position and call light within reach Safety maintained

## 2024-12-08 NOTE — ASSESSMENT & PLAN NOTE
lymphangioleiomyomatosis  CXR/Ct chest on admission with PTX   S/p chest tube placement in ED  Pulmonology consulted and managing CT  Supplemental O2 via NC prn  PTX improved but not resolved and with persistent air leak initially  CT surgery consulted for assistance in management  Lung transplant consulted  Patient to underwent doxycycline VATS 11/11 - improved pneumothorax as well as SOB   Hold sirolimus to allow for inflammatory response of pleurodesis  plan for water seal trial again tomorrow

## 2024-12-09 PROBLEM — J96.01 ACUTE RESPIRATORY FAILURE WITH HYPOXIA: Status: ACTIVE | Noted: 2024-12-09

## 2024-12-09 LAB
ALBUMIN SERPL BCP-MCNC: 3.5 G/DL (ref 3.5–5.2)
ALP SERPL-CCNC: 41 U/L (ref 40–150)
ALT SERPL W/O P-5'-P-CCNC: 13 U/L (ref 10–44)
ANION GAP SERPL CALC-SCNC: 8 MMOL/L (ref 8–16)
ASCENDING AORTA: 2.55 CM
AST SERPL-CCNC: 17 U/L (ref 10–40)
AV AREA BY CONTINUOUS VTI: 2.6 CM2
AV INDEX (PROSTH): 0.95
AV LVOT MEAN GRADIENT: 3 MMHG
AV LVOT PEAK GRADIENT: 5 MMHG
AV MEAN GRADIENT: 3 MMHG
AV PEAK GRADIENT: 4.8 MMHG
AV VALVE AREA BY VELOCITY RATIO: 2.8 CM²
AV VALVE AREA: 2.7 CM2
AV VELOCITY RATIO: 1
BASOPHILS # BLD AUTO: 0.03 K/UL (ref 0–0.2)
BASOPHILS NFR BLD: 0.6 % (ref 0–1.9)
BILIRUB SERPL-MCNC: 0.3 MG/DL (ref 0.1–1)
BSA FOR ECHO PROCEDURE: 1.59 M2
BUN SERPL-MCNC: 8 MG/DL (ref 6–20)
CALCIUM SERPL-MCNC: 8.3 MG/DL (ref 8.7–10.5)
CHLORIDE SERPL-SCNC: 106 MMOL/L (ref 95–110)
CO2 SERPL-SCNC: 24 MMOL/L (ref 23–29)
CREAT SERPL-MCNC: 0.7 MG/DL (ref 0.5–1.4)
CV ECHO LV RWT: 0.39 CM
DIFFERENTIAL METHOD BLD: ABNORMAL
DOP CALC AO PEAK VEL: 1.1 M/S
DOP CALC AO VTI: 23.6 CM
DOP CALC LVOT AREA: 2.8 CM2
DOP CALC LVOT DIAMETER: 1.9 CM
DOP CALC LVOT PEAK VEL: 1.1 M/S
DOP CALC LVOT STROKE VOLUME: 63.5 CM3
DOP CALCLVOT PEAK VEL VTI: 22.4 CM
E WAVE DECELERATION TIME: 159.08 MS
E/A RATIO: 1.8
E/E' RATIO: 4.05 M/S
ECHO EF ESTIMATED: 54 %
ECHO LV POSTERIOR WALL: 0.7 CM (ref 0.6–1.1)
EOSINOPHIL # BLD AUTO: 0.2 K/UL (ref 0–0.5)
EOSINOPHIL NFR BLD: 4.1 % (ref 0–8)
ERYTHROCYTE [DISTWIDTH] IN BLOOD BY AUTOMATED COUNT: 12.2 % (ref 11.5–14.5)
EST. GFR  (NO RACE VARIABLE): >60 ML/MIN/1.73 M^2
FRACTIONAL SHORTENING: 27.8 % (ref 28–44)
GLUCOSE SERPL-MCNC: 95 MG/DL (ref 70–110)
HCT VFR BLD AUTO: 37.5 % (ref 37–48.5)
HGB BLD-MCNC: 12.7 G/DL (ref 12–16)
IMM GRANULOCYTES # BLD AUTO: 0.06 K/UL (ref 0–0.04)
IMM GRANULOCYTES NFR BLD AUTO: 1.2 % (ref 0–0.5)
INFERIOR VENA CAVA SIZE SNIFF: 1 CM
INTERVENTRICULAR SEPTUM: 0.9 CM (ref 0.6–1.1)
IVC DIAMETER: 2.3 CM
IVRT: 94.2 MS
LA MAJOR: 5.2 CM
LA MINOR: 5.15 CM
LA WIDTH: 3.81 CM
LEFT ATRIUM SIZE: 2.5 CM
LEFT ATRIUM VOLUME INDEX MOD: 38.4 ML/M2
LEFT ATRIUM VOLUME INDEX: 26 ML/M2
LEFT ATRIUM VOLUME MOD: 61.85 ML
LEFT ATRIUM VOLUME: 41.9 CM3
LEFT INTERNAL DIMENSION IN SYSTOLE: 2.6 CM (ref 2.1–4)
LEFT VENTRICLE DIASTOLIC VOLUME INDEX: 33.32 ML/M2
LEFT VENTRICLE DIASTOLIC VOLUME: 53.64 ML
LEFT VENTRICLE MASS INDEX: 48.9 G/M2
LEFT VENTRICLE SYSTOLIC VOLUME INDEX: 15.2 ML/M2
LEFT VENTRICLE SYSTOLIC VOLUME: 24.51 ML
LEFT VENTRICULAR INTERNAL DIMENSION IN DIASTOLE: 3.6 CM (ref 3.5–6)
LEFT VENTRICULAR MASS: 78.8 G
LV LATERAL E/E' RATIO: 3.76
LV SEPTAL E/E' RATIO: 4.39
LYMPHOCYTES # BLD AUTO: 1.5 K/UL (ref 1–4.8)
LYMPHOCYTES NFR BLD: 29.2 % (ref 18–48)
MAGNESIUM SERPL-MCNC: 1.8 MG/DL (ref 1.6–2.6)
MCH RBC QN AUTO: 30.8 PG (ref 27–31)
MCHC RBC AUTO-ENTMCNC: 33.9 G/DL (ref 32–36)
MCV RBC AUTO: 91 FL (ref 82–98)
MONOCYTES # BLD AUTO: 0.4 K/UL (ref 0.3–1)
MONOCYTES NFR BLD: 7.7 % (ref 4–15)
MV PEAK A VEL: 0.44 M/S
MV PEAK E VEL: 0.79 M/S
NEUTROPHILS # BLD AUTO: 3 K/UL (ref 1.8–7.7)
NEUTROPHILS NFR BLD: 57.2 % (ref 38–73)
NRBC BLD-RTO: 0 /100 WBC
OHS CV RV/LV RATIO: 0.75 CM
PHOSPHATE SERPL-MCNC: 3.8 MG/DL (ref 2.7–4.5)
PLATELET # BLD AUTO: 190 K/UL (ref 150–450)
PMV BLD AUTO: 10 FL (ref 9.2–12.9)
POTASSIUM SERPL-SCNC: 3.9 MMOL/L (ref 3.5–5.1)
PROT SERPL-MCNC: 5.7 G/DL (ref 6–8.4)
RA MAJOR: 3.72 CM
RA PRESSURE ESTIMATED: 8 MMHG
RA WIDTH: 3.2 CM
RBC # BLD AUTO: 4.13 M/UL (ref 4–5.4)
RIGHT VENTRICLE DIASTOLIC BASEL DIMENSION: 2.7 CM
RV TISSUE DOPPLER FREE WALL SYSTOLIC VELOCITY 1 (APICAL 4 CHAMBER VIEW): 15.65 CM/S
SINUS: 2.68 CM
SODIUM SERPL-SCNC: 138 MMOL/L (ref 136–145)
STJ: 2.29 CM
TASV: 15 CM/S
TDI LATERAL: 0.21 M/S
TDI SEPTAL: 0.18 M/S
TDI: 0.2 M/S
TRICUSPID ANNULAR PLANE SYSTOLIC EXCURSION: 2.21 CM
WBC # BLD AUTO: 5.18 K/UL (ref 3.9–12.7)
Z-SCORE OF LEFT VENTRICULAR DIMENSION IN END DIASTOLE: -2.38
Z-SCORE OF LEFT VENTRICULAR DIMENSION IN END SYSTOLE: -0.68

## 2024-12-09 PROCEDURE — 94761 N-INVAS EAR/PLS OXIMETRY MLT: CPT | Mod: NTX

## 2024-12-09 PROCEDURE — 84100 ASSAY OF PHOSPHORUS: CPT | Mod: NTX | Performed by: STUDENT IN AN ORGANIZED HEALTH CARE EDUCATION/TRAINING PROGRAM

## 2024-12-09 PROCEDURE — 63600175 PHARM REV CODE 636 W HCPCS: Mod: NTX | Performed by: STUDENT IN AN ORGANIZED HEALTH CARE EDUCATION/TRAINING PROGRAM

## 2024-12-09 PROCEDURE — 99900035 HC TECH TIME PER 15 MIN (STAT): Mod: NTX

## 2024-12-09 PROCEDURE — 94799 UNLISTED PULMONARY SVC/PX: CPT | Mod: NTX

## 2024-12-09 PROCEDURE — 11000001 HC ACUTE MED/SURG PRIVATE ROOM: Mod: NTX

## 2024-12-09 PROCEDURE — 36415 COLL VENOUS BLD VENIPUNCTURE: CPT | Mod: NTX | Performed by: STUDENT IN AN ORGANIZED HEALTH CARE EDUCATION/TRAINING PROGRAM

## 2024-12-09 PROCEDURE — 99223 1ST HOSP IP/OBS HIGH 75: CPT | Mod: NTX,,, | Performed by: NURSE PRACTITIONER

## 2024-12-09 PROCEDURE — 85025 COMPLETE CBC W/AUTO DIFF WBC: CPT | Mod: NTX | Performed by: STUDENT IN AN ORGANIZED HEALTH CARE EDUCATION/TRAINING PROGRAM

## 2024-12-09 PROCEDURE — 99232 SBSQ HOSP IP/OBS MODERATE 35: CPT | Mod: NTX,,, | Performed by: INTERNAL MEDICINE

## 2024-12-09 PROCEDURE — 25000003 PHARM REV CODE 250: Mod: NTX | Performed by: STUDENT IN AN ORGANIZED HEALTH CARE EDUCATION/TRAINING PROGRAM

## 2024-12-09 PROCEDURE — 27000221 HC OXYGEN, UP TO 24 HOURS: Mod: NTX

## 2024-12-09 PROCEDURE — 83735 ASSAY OF MAGNESIUM: CPT | Mod: NTX | Performed by: STUDENT IN AN ORGANIZED HEALTH CARE EDUCATION/TRAINING PROGRAM

## 2024-12-09 PROCEDURE — 80053 COMPREHEN METABOLIC PANEL: CPT | Mod: NTX | Performed by: STUDENT IN AN ORGANIZED HEALTH CARE EDUCATION/TRAINING PROGRAM

## 2024-12-09 RX ADMIN — ENOXAPARIN SODIUM 40 MG: 40 INJECTION SUBCUTANEOUS at 05:12

## 2024-12-09 RX ADMIN — SENNOSIDES 8.6 MG: 8.6 TABLET, FILM COATED ORAL at 09:12

## 2024-12-09 RX ADMIN — SIROLIMUS 1 MG: 1 TABLET ORAL at 09:12

## 2024-12-09 RX ADMIN — HYDROMORPHONE HYDROCHLORIDE 1 MG: 1 INJECTION, SOLUTION INTRAMUSCULAR; INTRAVENOUS; SUBCUTANEOUS at 06:12

## 2024-12-09 NOTE — PROGRESS NOTES
[facility] Medicine Telemetry  Pulmonology  Progress Note    Patient Name: Ann-Marie Saavedra  MRN: [id number]  Admission Date: 12/7/2024  Hospital Length of Stay: 1 days  Code Status: Full Code  Attending Provider: Kendy Fallon MD  Primary Care Provider: Elsi Klein MD   Principal Problem: Secondary spontaneous pneumothorax    Subjective:     HPI:  Ann-Marie Saavedra is a 44 y.o. female with h/o abnormal pap and progressive onset of SOB over the past 3 years that worsened over the past 6 months found to have likely HEBERT and pneumothorax on xray who presented to the [facility] ED on 12/7/2024 with a primary complaint of SOB. Chest tube placed for PTX in the ED, persistent PTX to suction so will plan for CT chest with contrast and CT abd/pelvis with contrast while going to eval abdomen for possible lymphangioleiomyoma.     Overview/Hospital Course:  No notes on file    Interval History: Resting comfortably on exam, increased back pain overnight, off O2, chest tube with continued air leak, repeat CXR with worsened PTX this AM, lung transplant consulted for multidisciplinary discussions      Objective:     Vital Signs (Most Recent):  Temp: 98.4 °F (36.9 °C) (12/09/24 0737)  Pulse: 66 (12/09/24 0737)  Resp: 17 (12/09/24 0737)  BP: 100/66 (12/09/24 0737)  SpO2: 97 % (12/09/24 0737) Vital Signs (24h Range):  Temp:  [98 °F (36.7 °C)-98.9 °F (37.2 °C)] 98.4 °F (36.9 °C)  Pulse:  [64-75] 66  Resp:  [17-19] 17  SpO2:  [93 %-97 %] 97 %  BP: ()/(57-68) 100/66     Weight: 57 kg (125 lb 10.6 oz)  Body mass index is 20.28 kg/m².      Intake/Output Summary (Last 24 hours) at 12/9/2024 1053  Last data filed at 12/9/2024 0531  Gross per 24 hour   Intake 240 ml   Output 2510 ml   Net -2270 ml        Physical Exam  Vitals reviewed.   Constitutional:       General: She is not in acute distress.     Appearance: She is not toxic-appearing.   HENT:      Head: Normocephalic and atraumatic.   Eyes:      Extraocular  Movements: Extraocular movements intact.   Cardiovascular:      Rate and Rhythm: Normal rate.   Pulmonary:      Effort: Pulmonary effort is normal. No respiratory distress.      Comments: CT to suction with near continuous air leak  Skin:     General: Skin is warm and dry.   Neurological:      General: No focal deficit present.      Mental Status: She is alert.           Review of Systems    Vents:       Lines/Drains/Airways       Drain  Duration                  Chest Tube 12/07/24 1410 Tube - 1 Right 1 day    Female External Urinary Catheter w/ Suction 12/08/24 0800 1 day              Peripheral Intravenous Line  Duration                  Peripheral IV - Single Lumen 12/07/24 1149 Left Antecubital 1 day                    Significant Labs:    CBC/Anemia Profile:  Recent Labs   Lab 12/07/24  1151 12/08/24  0436 12/09/24  0238   WBC 7.12 6.09 5.18   HGB 14.0 12.9 12.7   HCT 41.4 38.9 37.5    198 190   MCV 90 92 91   RDW 12.4 12.4 12.2        Chemistries:  Recent Labs   Lab 12/07/24  1151 12/07/24  1701 12/08/24  0436 12/09/24  0238     --  135* 138   K 5.6* 4.1 4.7 3.9     --  107 106   CO2 24  --  20* 24   BUN 14  --  11 8   CREATININE 0.8  --  0.8 0.7   CALCIUM 9.6  --  8.6* 8.3*   ALBUMIN 4.3  --  3.7 3.5   PROT 7.1  --  6.0 5.7*   BILITOT 0.5  --  0.6 0.3   ALKPHOS 49  --  43 41   ALT 18  --  15 13   AST 22  --  20 17   MG  --   --  1.8 1.8   PHOS  --   --  4.1 3.8       All pertinent labs within the past 24 hours have been reviewed.    Significant Imaging:  I have reviewed all pertinent imaging results/findings within the past 24 hours.  Assessment/Plan:     Pulmonary  * Secondary spontaneous pneumothorax  -Continued suction and chest tube management, persistent air leak  -Daily AM CXR to evaluate PTX  -CXR 12/8 with increase PTX, but chest tube likely clogged and draining after AM rounds  -Thoracic surgery recs    Lymphangioleiomyomatosis  - CT chest/abd/pelvis with persistent evidence of  extensive HEBERT disease process, persistent decent size of PTX despite air leak  - VEGF lab sent 12/3 pending  - Went over imaging and possibilities going forward with the patient and parents at bedside today  - Discussed with thoracic surgery today regarding possible further interventions   - Involving advanced lung disease/lung transplant consult tomorrow to continue discussions with multidisciplinary group   - Leave CT to suction  - Consulted lung transplant team this AM for further workup and recs- will need further multidisciplinary discussions regarding best next steps with coordination between thoracic surgery and lung transplant/advanced lung disease teams           Edwin Becerra MD  Pulmonology  [facility] Tele[facility]

## 2024-12-09 NOTE — PLAN OF CARE
Problem: Adult Inpatient Plan of Care  Goal: Plan of Care Review  Outcome: Progressing  Goal: Patient-Specific Goal (Individualized)  Outcome: Progressing  Goal: Absence of Hospital-Acquired Illness or Injury  Outcome: Progressing  Goal: Optimal Comfort and Wellbeing  Outcome: Progressing  Goal: Readiness for Transition of Care  Outcome: Progressing     Problem: Respiratory Compromise (Pneumothorax)  Goal: Optimal Oxygenation and Ventilation  Outcome: Progressing    Pt had an uneventful night. VSS. Pt given prn pain medication . Chest tube remains in place via rt lung. Dressing C?D/I Pt in no resp  distress.   Pt is free of falls and injuries. Bed in lowest position and call light within reach. Safety maintained

## 2024-12-09 NOTE — PROGRESS NOTES
[facility] - Internal Medicine Telemetry  Thoracic Surgery  Progress Note    Subjective:     History of Present Illness:  Ann-Marie Saavedra is a 44 y.o. 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 She underwent placement of pigtail chest tube in the ED. Thoracic surgery was consulted for persistent air leak.    Today she reports improvement in dyspnea and chest pain since placement of the chest tube. She is on 2L by NC. She is on Sirolimus, which was started on 12/3.     Post-Op Info:  * No surgery found *         Interval History: Ms. Saavedra reports stable dyspnea and reports some pain around the chest tube, but is otherwise feeling well. Continued intermittent air leak.     Medications:  Continuous Infusions:  Scheduled Meds:   azelastine  2 spray Nasal BID    doxycycline  50 mg Oral Daily    enoxparin  40 mg Subcutaneous Daily    senna  8.6 mg Oral Daily    sirolimus  1 mg Oral Daily     PRN Meds:  Current Facility-Administered Medications:     albuterol, 2 puff, Inhalation, Q6H PRN    dextrose 10%, 12.5 g, Intravenous, PRN    dextrose 10%, 25 g, Intravenous, PRN    glucagon (human recombinant), 1 mg, Intramuscular, PRN    glucose, 16 g, Oral, PRN    glucose, 24 g, Oral, PRN    HYDROmorphone, 1 mg, Intravenous, Q4H PRN    naloxone, 0.02 mg, Intravenous, PRN    oxyCODONE, 5 mg, Oral, Q6H PRN    sodium chloride 0.9%, 10 mL, Intravenous, Q12H PRN     Review of patient's allergies indicates:  No Known Allergies  Objective:     Vital Signs (Most Recent):  Temp: 98.4 °F (36.9 °C) (12/09/24 0737)  Pulse: 66 (12/09/24 0737)  Resp: 17 (12/09/24 0737)  BP: 100/66 (12/09/24 0737)  SpO2: 97 % (12/09/24 0737) Vital Signs (24h Range):  Temp:  [98 °F (36.7 °C)-98.9 °F (37.2 °C)] 98.4 °F (36.9 °C)  Pulse:  [64-75] 66  Resp:  [17-19] 17  SpO2:  [91 %-97 %] 97  %  BP: ()/(57-68) 100/66     Intake/Output - Last 3 Shifts         12/07 0700 12/08 0659 12/08 0700 12/09 0659 12/09 0700  12/10 0659    P.O. 120 480     Total Intake(mL/kg) 120 (2.2) 480 (8.4)     Urine (mL/kg/hr) 700 2500 (1.8)     Chest Tube  10     Total Output 700 2510     Net -580 -2030            Urine Occurrence 1 x 2 x             SpO2: 97 %        Physical Exam  Vitals reviewed.   Constitutional:       General: She is not in acute distress.     Appearance: She is not toxic-appearing.   HENT:      Head: Normocephalic and atraumatic.   Eyes:      Extraocular Movements: Extraocular movements intact.   Cardiovascular:      Rate and Rhythm: Normal rate.   Pulmonary:      Effort: Pulmonary effort is normal. No respiratory distress.      Comments:   On 2 L by NC  CT to suction with near continuous air leak  Skin:     General: Skin is warm and dry.   Neurological:      General: No focal deficit present.      Mental Status: She is alert.            Significant Labs:  All pertinent labs from the last 24 hours have been reviewed.    Significant Diagnostics:  I have reviewed and interpreted all pertinent imaging results/findings within the past 24 hours.    VTE Risk Mitigation (From admission, onward)           Ordered     enoxaparin injection 40 mg  Daily         12/07/24 1542     IP VTE LOW RISK PATIENT  Once         12/07/24 1542     Place sequential compression device  Until discontinued         12/07/24 1542                  Assessment/Plan:     Lymphangioleiomyomatosis  Ann-Marie Saavedra is a 44 y.o. female with recently diagnosed lymphangioleiomyomatosis c/b pneumothorax who was admitted following chest tube placement by ED staff on 12/7/24. Thoracic surgery was consulted for evaluation of ongoing air leak. The size of the pneumothorax has decreased on interval imaging and the patient reports symptomatic improvement since placement.     -Continue chest tube to suction  -Daily chest x-ray  -Further  plans for management of pneumothorax to follow transplant eval    Thoracic surgery to continue to follow          Chad Diggs MD  Thoracic Surgery  [facility] Medicine Tele[facility]

## 2024-12-09 NOTE — PROGRESS NOTES
[facility] Medicine  Progress note    Team: [facility] Kendy Fallon MD  Admit Date: 12/7/2024  Code status: Full Code    Principal Problem:  Secondary spontaneous pneumothorax    Interval hx:  No new complaints. Pain controlled    PEx  Temp:  [98 °F (36.7 °C)-98.9 °F (37.2 °C)]   Pulse:  [65-75]   Resp:  [17-19]   BP: ()/(60-68)   SpO2:  [93 %-98 %]     Intake/Output Summary (Last 24 hours) at 12/9/2024 1230  Last data filed at 12/9/2024 0531  Gross per 24 hour   Intake 240 ml   Output 2010 ml   Net -1770 ml       General Appearance: no acute distress, WDWN  Heart: regular rate and rhythm, no heave  Respiratory: Normal respiratory effort, symmetric excursion, bilateral vesicular breath sounds ; decreased breath sounds on right upper lung  Abdomen: Soft, non-tender; bowel sounds active  Skin: intact, no rash, no ulcers  Neurologic:  No focal numbness or weakness  Mental status: Alert, oriented x 4, affect appropriate    Recent Labs   Lab 12/07/24  1151 12/08/24  0436 12/09/24  0238   WBC 7.12 6.09 5.18   HGB 14.0 12.9 12.7   HCT 41.4 38.9 37.5    198 190     Recent Labs   Lab 12/07/24  1151 12/07/24  1701 12/08/24  0436 12/09/24  0238     --  135* 138   K 5.6* 4.1 4.7 3.9     --  107 106   CO2 24  --  20* 24   BUN 14  --  11 8   CREATININE 0.8  --  0.8 0.7   GLU 94  --  86 95   CALCIUM 9.6  --  8.6* 8.3*   MG  --   --  1.8 1.8   PHOS  --   --  4.1 3.8     Recent Labs   Lab 12/07/24  1151 12/08/24  0436 12/09/24  0238   ALKPHOS 49 43 41   ALT 18 15 13   AST 22 20 17   ALBUMIN 4.3 3.7 3.5   PROT 7.1 6.0 5.7*   BILITOT 0.5 0.6 0.3      Scheduled Meds:   azelastine  2 spray Nasal BID    Doxycycline 40 mg DR Capsule   Oral Daily    enoxparin  40 mg Subcutaneous Daily    senna  8.6 mg Oral Daily    sirolimus  1 mg Oral Daily     Continuous Infusions:  As Needed:    Current Facility-Administered Medications:     albuterol, 2 puff, Inhalation, Q6H PRN    dextrose 10%, 12.5 g, Intravenous, PRN     dextrose 10%, 25 g, Intravenous, PRN    glucagon (human recombinant), 1 mg, Intramuscular, PRN    glucose, 16 g, Oral, PRN    glucose, 24 g, Oral, PRN    HYDROmorphone, 1 mg, Intravenous, Q4H PRN    naloxone, 0.02 mg, Intravenous, PRN    oxyCODONE, 5 mg, Oral, Q6H PRN    sodium chloride 0.9%, 10 mL, Intravenous, Q12H PRN    Assessment and Plan  / Problems managed today    * Secondary spontaneous pneumothorax  Iso lymphangioleiomyomatosis diagnosis  CXR/Ct chest on admission with PTX   S/p chest tube placement in ED  Pulmonology consulted and managing CT  Supplemental O2 via NC  CXR at 1800 - if PTX not improving will get CT chest  Pain control: Oxycodone 5 q 6 PRN and dilaudid 1 q 4 PRN (start prophylactic stool softer)  PTX improved but not resolved and with persistent air leak  CT surgery consulted for assistance in management  Lung transplant consulted    Hyperkalemia  Hyperkalemia is likely due to  Unclear cause .The patients most recent potassium results are listed below.  Recent Labs     12/07/24  1151 12/07/24  1701 12/08/24  0436   K 5.6* 4.1 4.7       Plan  - Monitor for arrhythmias with EKG and/or continuous telemetry.   - Repeat K - if still elevated will treat .   - Monitor potassium: Every 12 hours    Lymphangioleiomyomatosis  Recent diagnosis   Being referred as outpatient to advanced lung disease and lung transplant  C/w sirolimus 1 mg daily  Pulm following    Rosacea  C/w home doxycycline daily        Diet:  regular  GI PPx: not needed  DVT PPx:  enoxaparin  Airways: room air  Drain: chest tube    Goals of Care:  Return to prior functional status     Discharge Planning   DAVID: 12/12/2024   Is the patient medically ready for discharge?:     Reason for patient still in hospital (select all that apply): Patient trending condition, Treatment, and Consult recommendations  Discharge Plan A: Home, Home with family        Kendy Fallon MD

## 2024-12-09 NOTE — CARE UPDATE
I have reviewed the chart of Ann-Marie Saavedra who is hospitalized for the following:    Active Hospital Problems    Diagnosis    *Secondary spontaneous pneumothorax    Acute respiratory failure with hypoxia  - 2/2 PTX   - 2L NC, wean as able   - Pulm, CTS, lung transplant consulted     Hyperkalemia    Lymphangioleiomyomatosis    Yordan Zhang PA-C  [facility]

## 2024-12-09 NOTE — ASSESSMENT & PLAN NOTE
Ann-Marie Saavedra is a 44 y.o. female with recently diagnosed lymphangioleiomyomatosis c/b pneumothorax who was admitted following chest tube placement by ED staff on 12/7/24. Thoracic surgery was consulted for evaluation of ongoing air leak. The size of the pneumothorax has decreased on interval imaging and the patient reports symptomatic improvement since placement.     -Continue chest tube to suction  -Daily chest x-ray  -Further plans for management of pneumothorax to follow transplant eval    Thoracic surgery to continue to follow

## 2024-12-09 NOTE — ASSESSMENT & PLAN NOTE
Chest tube in place on continued suction.  Has persistent air leak.  Thoracic surgery consulted for further intervention.  Supplemental oxygen as needed. General pulmonary following.

## 2024-12-09 NOTE — HPI
Ann-Marie Saavedra is a 44 y.o. female with h/o abnormal pap and progressive onset of SOB over the past 3 years that worsened over the past 6 months found to have likely HEBERT and pneumothorax on xray who presented to the [facility] ED on 12/7/2024 with a primary complaint of SOB. Chest tube placed for PTX in the ED, persistent PTX to suction so will plan for CT chest with contrast and CT abd/pelvis with contrast while going to eval abdomen for possible lymphangioleiomyoma.

## 2024-12-09 NOTE — SUBJECTIVE & OBJECTIVE
Interval History: Ms. Saavedra reports stable dyspnea and reports some pain around the chest tube, but is otherwise feeling well. Continued intermittent air leak.     Medications:  Continuous Infusions:  Scheduled Meds:   azelastine  2 spray Nasal BID    doxycycline  50 mg Oral Daily    enoxparin  40 mg Subcutaneous Daily    senna  8.6 mg Oral Daily    sirolimus  1 mg Oral Daily     PRN Meds:  Current Facility-Administered Medications:     albuterol, 2 puff, Inhalation, Q6H PRN    dextrose 10%, 12.5 g, Intravenous, PRN    dextrose 10%, 25 g, Intravenous, PRN    glucagon (human recombinant), 1 mg, Intramuscular, PRN    glucose, 16 g, Oral, PRN    glucose, 24 g, Oral, PRN    HYDROmorphone, 1 mg, Intravenous, Q4H PRN    naloxone, 0.02 mg, Intravenous, PRN    oxyCODONE, 5 mg, Oral, Q6H PRN    sodium chloride 0.9%, 10 mL, Intravenous, Q12H PRN     Review of patient's allergies indicates:  No Known Allergies  Objective:     Vital Signs (Most Recent):  Temp: 98.4 °F (36.9 °C) (12/09/24 0737)  Pulse: 66 (12/09/24 0737)  Resp: 17 (12/09/24 0737)  BP: 100/66 (12/09/24 0737)  SpO2: 97 % (12/09/24 0737) Vital Signs (24h Range):  Temp:  [98 °F (36.7 °C)-98.9 °F (37.2 °C)] 98.4 °F (36.9 °C)  Pulse:  [64-75] 66  Resp:  [17-19] 17  SpO2:  [91 %-97 %] 97 %  BP: ()/(57-68) 100/66     Intake/Output - Last 3 Shifts         12/07 0700  12/08 0659 12/08 0700  12/09 0659 12/09 0700  12/10 0659    P.O. 120 480     Total Intake(mL/kg) 120 (2.2) 480 (8.4)     Urine (mL/kg/hr) 700 2500 (1.8)     Chest Tube  10     Total Output 700 2510     Net -580 -2030            Urine Occurrence 1 x 2 x             SpO2: 97 %        Physical Exam  Vitals reviewed.   Constitutional:       General: She is not in acute distress.     Appearance: She is not toxic-appearing.   HENT:      Head: Normocephalic and atraumatic.   Eyes:      Extraocular Movements: Extraocular movements intact.   Cardiovascular:      Rate and Rhythm: Normal rate.   Pulmonary:       Effort: Pulmonary effort is normal. No respiratory distress.      Comments:   On 2 L by NC  CT to suction with near continuous air leak  Skin:     General: Skin is warm and dry.   Neurological:      General: No focal deficit present.      Mental Status: She is alert.            Significant Labs:  All pertinent labs from the last 24 hours have been reviewed.    Significant Diagnostics:  I have reviewed and interpreted all pertinent imaging results/findings within the past 24 hours.    VTE Risk Mitigation (From admission, onward)           Ordered     enoxaparin injection 40 mg  Daily         12/07/24 1542     IP VTE LOW RISK PATIENT  Once         12/07/24 1542     Place sequential compression device  Until discontinued         12/07/24 1542

## 2024-12-09 NOTE — SUBJECTIVE & OBJECTIVE
Past Medical History:   Diagnosis Date    Abnormal Pap smear 2013    no colpo    Keloid cicatrix     under right breast    Wound dehiscence in puerperium, perineal, delivered/postpartum 9/25/2015       Past Surgical History:   Procedure Laterality Date    CYST REMOVAL Right 2009    chest wall    FOOT TENDON SURGERY Bilateral 2008    bunion       Review of patient's allergies indicates:  No Known Allergies    PTA Medications   Medication Sig    doxycycline (ORACEA) 40 mg capsule Take 40 mg by mouth once daily.    sirolimus (RAPAMUNE) 1 MG Tab Take 1 tablet (1 mg total) by mouth once daily.    albuterol (VENTOLIN HFA) 90 mcg/actuation inhaler Inhale 2 puffs into the lungs every 6 (six) hours as needed for Wheezing or Shortness of Breath. Rescue    azelastine (ASTELIN) 137 mcg (0.1 %) nasal spray 2 sprays (274 mcg total) by Nasal route 2 (two) times daily.    levocetirizine (XYZAL) 5 MG tablet Take 1 tablet (5 mg total) by mouth every evening.    multivitamin (THERAGRAN) per tablet Take 1 tablet by mouth once daily.     Family History       Problem Relation (Age of Onset)    Cancer Maternal Grandmother, Maternal Grandfather, Paternal Grandfather, Paternal Uncle    Colon cancer Maternal Grandfather, Paternal Grandfather (67)    Eczema Sister    Hyperlipidemia Mother    Lung cancer Maternal Grandmother    No Known Problems Father, Sister, Brother    Prostate cancer Paternal Uncle          Tobacco Use    Smoking status: Never    Smokeless tobacco: Never   Substance and Sexual Activity    Alcohol use: Yes     Alcohol/week: 5.0 standard drinks of alcohol     Types: 5 Glasses of wine per week    Drug use: No    Sexual activity: Yes     Partners: Male     Birth control/protection: None     Review of Systems   Constitutional:  Positive for activity change and fatigue. Negative for chills.   Respiratory:  Positive for chest tightness and shortness of breath.    Cardiovascular:  Negative for palpitations and leg swelling.    Gastrointestinal:  Negative for abdominal distention and abdominal pain.   Musculoskeletal:  Negative for arthralgias and back pain.   Neurological:  Negative for syncope.   Psychiatric/Behavioral:  Negative for agitation and behavioral problems.      Objective:     Vital Signs (Most Recent):  Temp: 98.4 °F (36.9 °C) (12/09/24 1506)  Pulse: 83 (12/09/24 1506)  Resp: 17 (12/09/24 1506)  BP: 108/68 (12/09/24 1506)  SpO2: (!) 93 % (12/09/24 1506) Vital Signs (24h Range):  Temp:  [98 °F (36.7 °C)-98.4 °F (36.9 °C)] 98.4 °F (36.9 °C)  Pulse:  [65-83] 83  Resp:  [17-18] 17  SpO2:  [93 %-98 %] 93 %  BP: ()/(60-68) 108/68     Weight: 57 kg (125 lb 10.6 oz)  Body mass index is 20.28 kg/m².      Intake/Output Summary (Last 24 hours) at 12/9/2024 1559  Last data filed at 12/9/2024 1336  Gross per 24 hour   Intake 240 ml   Output 2000 ml   Net -1760 ml          Physical Exam  Vitals reviewed.   Constitutional:       General: She is not in acute distress.     Appearance: She is not toxic-appearing.   HENT:      Head: Normocephalic and atraumatic.   Eyes:      Extraocular Movements: Extraocular movements intact.      Conjunctiva/sclera: Conjunctivae normal.   Cardiovascular:      Rate and Rhythm: Normal rate.   Pulmonary:      Effort: Pulmonary effort is normal. No respiratory distress.      Comments: CT to suction with near continuous air leak  Skin:     General: Skin is warm and dry.   Neurological:      General: No focal deficit present.      Mental Status: She is alert and oriented to person, place, and time.   Psychiatric:         Thought Content: Thought content normal.              Significant Labs:  CBC:  Recent Labs   Lab 12/09/24  0238   WBC 5.18   RBC 4.13   HGB 12.7   HCT 37.5      MCV 91   MCH 30.8   MCHC 33.9     BMP:  Recent Labs   Lab 12/09/24 0238      K 3.9      CO2 24   BUN 8   CREATININE 0.7   CALCIUM 8.3*        I have reviewed all pertinent labs within the past 24  hours.    Diagnostic Results:  X-Ray: Reviewed

## 2024-12-09 NOTE — ASSESSMENT & PLAN NOTE
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, persistent decent size of PTX despite air leak  - VEGF lab sent 12/3 pending  - Went over imaging and possibilities going forward with the patient and parents at bedside today  - Discussed with thoracic surgery today regarding possible further interventions   - Involving advanced lung disease/lung transplant consult tomorrow to continue discussions with multidisciplinary group   - Leave CT to suction  - Consulted lung transplant team this AM for further workup and recs- will need further multidisciplinary discussions regarding best next steps with coordination between thoracic surgery and lung transplant/advanced lung disease teams

## 2024-12-09 NOTE — ASSESSMENT & PLAN NOTE
-Continued suction and chest tube management, persistent air leak  -Daily AM CXR to evaluate PTX  -CXR 12/8 with increase PTX, but chest tube likely clogged and draining after AM rounds  -Thoracic surgery recs

## 2024-12-09 NOTE — CONSULTS
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   Of note, she has been having worsening shortness of breath for years, intermittently with chest pain that has limited her work. Mostly SOB with  activity that has limited her tennis/running and daily work. 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 She is currently being referred to advanced lung disease and lung transplant.   Seen by pulmonology in ED, chest tube placed in ED. Chest xray after chest tube placement shows adequate placement with interval decrease in pneumothorax without complete resolution. Being admitted to  for workup and management of PTX and hyperkalemia.     Past Medical History:   Diagnosis Date    Abnormal Pap smear 2013    no colpo    Keloid cicatrix     under right breast    Wound dehiscence in puerperium, perineal, delivered/postpartum 9/25/2015       Past Surgical History:   Procedure Laterality Date    CYST REMOVAL Right 2009    chest wall    FOOT TENDON SURGERY Bilateral 2008    bunion       Review of patient's allergies indicates:  No Known Allergies    PTA Medications   Medication Sig    doxycycline (ORACEA) 40 mg capsule Take 40 mg by mouth once daily.    sirolimus (RAPAMUNE) 1 MG Tab Take 1 tablet (1 mg total) by mouth once daily.    albuterol (VENTOLIN HFA) 90 mcg/actuation  inhaler Inhale 2 puffs into the lungs every 6 (six) hours as needed for Wheezing or Shortness of Breath. Rescue    azelastine (ASTELIN) 137 mcg (0.1 %) nasal spray 2 sprays (274 mcg total) by Nasal route 2 (two) times daily.    levocetirizine (XYZAL) 5 MG tablet Take 1 tablet (5 mg total) by mouth every evening.    multivitamin (THERAGRAN) per tablet Take 1 tablet by mouth once daily.     Family History       Problem Relation (Age of Onset)    Cancer Maternal Grandmother, Maternal Grandfather, Paternal Grandfather, Paternal Uncle    Colon cancer Maternal Grandfather, Paternal Grandfather (67)    Eczema Sister    Hyperlipidemia Mother    Lung cancer Maternal Grandmother    No Known Problems Father, Sister, Brother    Prostate cancer Paternal Uncle          Tobacco Use    Smoking status: Never    Smokeless tobacco: Never   Substance and Sexual Activity    Alcohol use: Yes     Alcohol/week: 5.0 standard drinks of alcohol     Types: 5 Glasses of wine per week    Drug use: No    Sexual activity: Yes     Partners: Male     Birth control/protection: None     Review of Systems   Constitutional:  Positive for activity change and fatigue. Negative for chills.   Respiratory:  Positive for chest tightness and shortness of breath.    Cardiovascular:  Negative for palpitations and leg swelling.   Gastrointestinal:  Negative for abdominal distention and abdominal pain.   Musculoskeletal:  Negative for arthralgias and back pain.   Neurological:  Negative for syncope.   Psychiatric/Behavioral:  Negative for agitation and behavioral problems.      Objective:     Vital Signs (Most Recent):  Temp: 98.4 °F (36.9 °C) (12/09/24 1506)  Pulse: 83 (12/09/24 1506)  Resp: 17 (12/09/24 1506)  BP: 108/68 (12/09/24 1506)  SpO2: (!) 93 % (12/09/24 1506) Vital Signs (24h Range):  Temp:  [98 °F (36.7 °C)-98.4 °F (36.9 °C)] 98.4 °F (36.9 °C)  Pulse:  [65-83] 83  Resp:  [17-18] 17  SpO2:  [93 %-98 %] 93 %  BP: ()/(60-68) 108/68     Weight: 57 kg  (125 lb 10.6 oz)  Body mass index is 20.28 kg/m².      Intake/Output Summary (Last 24 hours) at 12/9/2024 1559  Last data filed at 12/9/2024 1336  Gross per 24 hour   Intake 240 ml   Output 2000 ml   Net -1760 ml          Physical Exam  Vitals reviewed.   Constitutional:       General: She is not in acute distress.     Appearance: She is not toxic-appearing.   HENT:      Head: Normocephalic and atraumatic.   Eyes:      Extraocular Movements: Extraocular movements intact.      Conjunctiva/sclera: Conjunctivae normal.   Cardiovascular:      Rate and Rhythm: Normal rate.   Pulmonary:      Effort: Pulmonary effort is normal. No respiratory distress.      Comments: CT to suction with near continuous air leak  Skin:     General: Skin is warm and dry.   Neurological:      General: No focal deficit present.      Mental Status: She is alert and oriented to person, place, and time.   Psychiatric:         Thought Content: Thought content normal.              Significant Labs:  CBC:  Recent Labs   Lab 12/09/24  0238   WBC 5.18   RBC 4.13   HGB 12.7   HCT 37.5      MCV 91   MCH 30.8   MCHC 33.9     BMP:  Recent Labs   Lab 12/09/24  0238      K 3.9      CO2 24   BUN 8   CREATININE 0.7   CALCIUM 8.3*        I have reviewed all pertinent labs within the past 24 hours.    Diagnostic Results:  X-Ray: Reviewed  Assessment/Plan:     Pulmonary  * Secondary spontaneous pneumothorax  Chest tube in place on continued suction.  Has persistent air leak.  Thoracic surgery consulted for further intervention.  Supplemental oxygen as needed. General pulmonary following.       Lymphangioleiomyomatosis  VEGF pending.  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  Currently on sirolimus 1 mg daily.          Thank you for your consult. I will follow-up with patient. Please contact us if you have any additional questions.    Darius Gamboa, PEDRITO  Lung Transplant  [facility] - Internal Medicine  Telemetry

## 2024-12-09 NOTE — ASSESSMENT & PLAN NOTE
VEGF pending.  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  Currently on sirolimus 1 mg daily.

## 2024-12-09 NOTE — SUBJECTIVE & OBJECTIVE
Interval History: Resting comfortably on exam, increased back pain overnight, off O2, chest with continued air leak, repeat CXR with worsened PTX this AM, lung transplant consulted      Objective:     Vital Signs (Most Recent):  Temp: 98.4 °F (36.9 °C) (12/09/24 0737)  Pulse: 66 (12/09/24 0737)  Resp: 17 (12/09/24 0737)  BP: 100/66 (12/09/24 0737)  SpO2: 97 % (12/09/24 0737) Vital Signs (24h Range):  Temp:  [98 °F (36.7 °C)-98.9 °F (37.2 °C)] 98.4 °F (36.9 °C)  Pulse:  [64-75] 66  Resp:  [17-19] 17  SpO2:  [93 %-97 %] 97 %  BP: ()/(57-68) 100/66     Weight: 57 kg (125 lb 10.6 oz)  Body mass index is 20.28 kg/m².      Intake/Output Summary (Last 24 hours) at 12/9/2024 1053  Last data filed at 12/9/2024 0531  Gross per 24 hour   Intake 240 ml   Output 2510 ml   Net -2270 ml        Physical Exam  Vitals reviewed.   Constitutional:       General: She is not in acute distress.     Appearance: She is not toxic-appearing.   HENT:      Head: Normocephalic and atraumatic.   Eyes:      Extraocular Movements: Extraocular movements intact.   Cardiovascular:      Rate and Rhythm: Normal rate.   Pulmonary:      Effort: Pulmonary effort is normal. No respiratory distress.      Comments: CT to suction with near continuous air leak  Skin:     General: Skin is warm and dry.   Neurological:      General: No focal deficit present.      Mental Status: She is alert.           Review of Systems    Vents:       Lines/Drains/Airways       Drain  Duration                  Chest Tube 12/07/24 1410 Tube - 1 Right 1 day    Female External Urinary Catheter w/ Suction 12/08/24 0800 1 day              Peripheral Intravenous Line  Duration                  Peripheral IV - Single Lumen 12/07/24 1149 Left Antecubital 1 day                    Significant Labs:    CBC/Anemia Profile:  Recent Labs   Lab 12/07/24  1151 12/08/24  0436 12/09/24  0238   WBC 7.12 6.09 5.18   HGB 14.0 12.9 12.7   HCT 41.4 38.9 37.5    198 190   MCV 90 92 91   RDW  12.4 12.4 12.2        Chemistries:  Recent Labs   Lab 12/07/24  1151 12/07/24  1701 12/08/24  0436 12/09/24  0238     --  135* 138   K 5.6* 4.1 4.7 3.9     --  107 106   CO2 24  --  20* 24   BUN 14  --  11 8   CREATININE 0.8  --  0.8 0.7   CALCIUM 9.6  --  8.6* 8.3*   ALBUMIN 4.3  --  3.7 3.5   PROT 7.1  --  6.0 5.7*   BILITOT 0.5  --  0.6 0.3   ALKPHOS 49  --  43 41   ALT 18  --  15 13   AST 22  --  20 17   MG  --   --  1.8 1.8   PHOS  --   --  4.1 3.8       All pertinent labs within the past 24 hours have been reviewed.    Significant Imaging:  I have reviewed all pertinent imaging results/findings within the past 24 hours.

## 2024-12-10 ENCOUNTER — ANESTHESIA EVENT (OUTPATIENT)
Dept: SURGERY | Facility: HOSPITAL | Age: 44
End: 2024-12-10
Payer: COMMERCIAL

## 2024-12-10 LAB
ALBUMIN SERPL BCP-MCNC: 3.6 G/DL (ref 3.5–5.2)
ALP SERPL-CCNC: 44 U/L (ref 40–150)
ALT SERPL W/O P-5'-P-CCNC: 13 U/L (ref 10–44)
ANION GAP SERPL CALC-SCNC: 9 MMOL/L (ref 8–16)
AST SERPL-CCNC: 17 U/L (ref 10–40)
BASOPHILS # BLD AUTO: 0.02 K/UL (ref 0–0.2)
BASOPHILS NFR BLD: 0.4 % (ref 0–1.9)
BILIRUB SERPL-MCNC: 0.5 MG/DL (ref 0.1–1)
BUN SERPL-MCNC: 11 MG/DL (ref 6–20)
CALCIUM SERPL-MCNC: 8.5 MG/DL (ref 8.7–10.5)
CHLORIDE SERPL-SCNC: 108 MMOL/L (ref 95–110)
CO2 SERPL-SCNC: 22 MMOL/L (ref 23–29)
CREAT SERPL-MCNC: 0.8 MG/DL (ref 0.5–1.4)
DIFFERENTIAL METHOD BLD: ABNORMAL
EOSINOPHIL # BLD AUTO: 0.2 K/UL (ref 0–0.5)
EOSINOPHIL NFR BLD: 4.5 % (ref 0–8)
ERYTHROCYTE [DISTWIDTH] IN BLOOD BY AUTOMATED COUNT: 12.3 % (ref 11.5–14.5)
EST. GFR  (NO RACE VARIABLE): >60 ML/MIN/1.73 M^2
GLUCOSE SERPL-MCNC: 91 MG/DL (ref 70–110)
HCT VFR BLD AUTO: 38.9 % (ref 37–48.5)
HGB BLD-MCNC: 12.7 G/DL (ref 12–16)
IMM GRANULOCYTES # BLD AUTO: 0.04 K/UL (ref 0–0.04)
IMM GRANULOCYTES NFR BLD AUTO: 0.8 % (ref 0–0.5)
LYMPHOCYTES # BLD AUTO: 1.3 K/UL (ref 1–4.8)
LYMPHOCYTES NFR BLD: 25.6 % (ref 18–48)
MAGNESIUM SERPL-MCNC: 1.9 MG/DL (ref 1.6–2.6)
MCH RBC QN AUTO: 30 PG (ref 27–31)
MCHC RBC AUTO-ENTMCNC: 32.6 G/DL (ref 32–36)
MCV RBC AUTO: 92 FL (ref 82–98)
MONOCYTES # BLD AUTO: 0.4 K/UL (ref 0.3–1)
MONOCYTES NFR BLD: 7.4 % (ref 4–15)
NEUTROPHILS # BLD AUTO: 3.1 K/UL (ref 1.8–7.7)
NEUTROPHILS NFR BLD: 61.3 % (ref 38–73)
NRBC BLD-RTO: 0 /100 WBC
PHOSPHATE SERPL-MCNC: 3.5 MG/DL (ref 2.7–4.5)
PLATELET # BLD AUTO: 208 K/UL (ref 150–450)
PMV BLD AUTO: 10.6 FL (ref 9.2–12.9)
POTASSIUM SERPL-SCNC: 3.8 MMOL/L (ref 3.5–5.1)
PROT SERPL-MCNC: 6.1 G/DL (ref 6–8.4)
RBC # BLD AUTO: 4.23 M/UL (ref 4–5.4)
SODIUM SERPL-SCNC: 139 MMOL/L (ref 136–145)
WBC # BLD AUTO: 5.12 K/UL (ref 3.9–12.7)

## 2024-12-10 PROCEDURE — 84100 ASSAY OF PHOSPHORUS: CPT | Mod: NTX | Performed by: STUDENT IN AN ORGANIZED HEALTH CARE EDUCATION/TRAINING PROGRAM

## 2024-12-10 PROCEDURE — 85025 COMPLETE CBC W/AUTO DIFF WBC: CPT | Mod: NTX | Performed by: STUDENT IN AN ORGANIZED HEALTH CARE EDUCATION/TRAINING PROGRAM

## 2024-12-10 PROCEDURE — 99233 SBSQ HOSP IP/OBS HIGH 50: CPT | Mod: NTX,,, | Performed by: NURSE PRACTITIONER

## 2024-12-10 PROCEDURE — 11000001 HC ACUTE MED/SURG PRIVATE ROOM: Mod: NTX

## 2024-12-10 PROCEDURE — 94761 N-INVAS EAR/PLS OXIMETRY MLT: CPT | Mod: NTX

## 2024-12-10 PROCEDURE — 36415 COLL VENOUS BLD VENIPUNCTURE: CPT | Mod: NTX | Performed by: STUDENT IN AN ORGANIZED HEALTH CARE EDUCATION/TRAINING PROGRAM

## 2024-12-10 PROCEDURE — 80053 COMPREHEN METABOLIC PANEL: CPT | Mod: NTX | Performed by: STUDENT IN AN ORGANIZED HEALTH CARE EDUCATION/TRAINING PROGRAM

## 2024-12-10 PROCEDURE — 25000003 PHARM REV CODE 250: Mod: NTX | Performed by: INTERNAL MEDICINE

## 2024-12-10 PROCEDURE — 63600175 PHARM REV CODE 636 W HCPCS: Mod: NTX | Performed by: STUDENT IN AN ORGANIZED HEALTH CARE EDUCATION/TRAINING PROGRAM

## 2024-12-10 PROCEDURE — 27000221 HC OXYGEN, UP TO 24 HOURS: Mod: NTX

## 2024-12-10 PROCEDURE — 25000003 PHARM REV CODE 250: Mod: NTX | Performed by: STUDENT IN AN ORGANIZED HEALTH CARE EDUCATION/TRAINING PROGRAM

## 2024-12-10 PROCEDURE — 83735 ASSAY OF MAGNESIUM: CPT | Mod: NTX | Performed by: STUDENT IN AN ORGANIZED HEALTH CARE EDUCATION/TRAINING PROGRAM

## 2024-12-10 RX ORDER — DOXYCYCLINE 50 MG/1
50 CAPSULE ORAL EVERY 12 HOURS
Status: DISCONTINUED | OUTPATIENT
Start: 2024-12-10 | End: 2024-12-18 | Stop reason: HOSPADM

## 2024-12-10 RX ADMIN — SENNOSIDES 8.6 MG: 8.6 TABLET, FILM COATED ORAL at 07:12

## 2024-12-10 RX ADMIN — DOXYCYCLINE 50 MG: 50 CAPSULE ORAL at 09:12

## 2024-12-10 RX ADMIN — ENOXAPARIN SODIUM 40 MG: 40 INJECTION SUBCUTANEOUS at 04:12

## 2024-12-10 RX ADMIN — SIROLIMUS 1 MG: 1 TABLET ORAL at 07:12

## 2024-12-10 NOTE — PLAN OF CARE
Problem: Adult Inpatient Plan of Care  Goal: Plan of Care Review  Outcome: Progressing  Goal: Patient-Specific Goal (Individualized)  Outcome: Progressing  Goal: Absence of Hospital-Acquired Illness or Injury  Outcome: Progressing  Goal: Optimal Comfort and Wellbeing  Outcome: Progressing  Goal: Readiness for Transition of Care  Outcome: Progressing     Problem: Respiratory Compromise (Pneumothorax)  Goal: Optimal Oxygenation and Ventilation  Outcome: Progressing     Right chest tube drain connected to water seal and hooked to suction. Pressure dressing on the insertion site clean dry and intact, no complaints of pain or discomfort.     Bed locked and in low position. Side rails x 2. Call light within patient reach.

## 2024-12-10 NOTE — ASSESSMENT & PLAN NOTE
Chest tube remains in place on continued suction.  Has persistent air leak.  Thoracic surgery consulted for further intervention.  Supplemental oxygen as needed. General pulmonary following. Had discussion yesterday between patient and Dr. Brandon regarding HEBERT treatment options and lung transplant candidacy.  Will discuss treatment options in interdisipilinary meeting today with CTS.

## 2024-12-10 NOTE — SUBJECTIVE & OBJECTIVE
Interval History: Ms. Saavedra reports she is feeling well today. Endorsing some lower back pain, but attributes this to the bed. VSS. Afebrile. Chest tube remains in place with ongoing air leak.     Medications:  Continuous Infusions:  Scheduled Meds:   azelastine  2 spray Nasal BID    doxycycline  50 mg Oral Q12H    enoxparin  40 mg Subcutaneous Daily    senna  8.6 mg Oral Daily    sirolimus  1 mg Oral Daily     PRN Meds:  Current Facility-Administered Medications:     albuterol, 2 puff, Inhalation, Q6H PRN    dextrose 10%, 12.5 g, Intravenous, PRN    dextrose 10%, 25 g, Intravenous, PRN    glucagon (human recombinant), 1 mg, Intramuscular, PRN    glucose, 16 g, Oral, PRN    glucose, 24 g, Oral, PRN    HYDROmorphone, 1 mg, Intravenous, Q4H PRN    naloxone, 0.02 mg, Intravenous, PRN    oxyCODONE, 5 mg, Oral, Q6H PRN    sodium chloride 0.9%, 10 mL, Intravenous, Q12H PRN     Review of patient's allergies indicates:  No Known Allergies  Objective:     Vital Signs (Most Recent):  Temp: 99.1 °F (37.3 °C) (12/10/24 0733)  Pulse: 69 (12/10/24 0733)  Resp: 19 (12/10/24 1043)  BP: 115/70 (12/10/24 0733)  SpO2: 97 % (12/10/24 1043) Vital Signs (24h Range):  Temp:  [98.2 °F (36.8 °C)-99.1 °F (37.3 °C)] 99.1 °F (37.3 °C)  Pulse:  [63-83] 69  Resp:  [17-19] 19  SpO2:  [93 %-98 %] 97 %  BP: ()/(60-70) 115/70     Intake/Output - Last 3 Shifts         12/08 0700  12/09 0659 12/09 0700  12/10 0659 12/10 0700  12/11 0659    P.O. 480 340 240    Total Intake(mL/kg) 480 (8.4) 340 (6) 240 (4.2)    Urine (mL/kg/hr) 2500 (1.8) 3050 (2.2)     Stool  0     Chest Tube 10 0 0    Total Output 2510 3050 0    Net -2030 -2710 +240           Urine Occurrence 2 x 1 x     Stool Occurrence  0 x             SpO2: 97 %        Physical Exam  Vitals reviewed.   Constitutional:       General: She is not in acute distress.     Appearance: She is not toxic-appearing.   HENT:      Head: Normocephalic and atraumatic.   Eyes:      Extraocular Movements:  Extraocular movements intact.   Cardiovascular:      Rate and Rhythm: Normal rate.   Pulmonary:      Effort: Pulmonary effort is normal. No respiratory distress.      Comments:   CT to suction with near continuous air leak  Skin:     General: Skin is warm and dry.   Neurological:      General: No focal deficit present.      Mental Status: She is alert.            Significant Labs:  All pertinent labs from the last 24 hours have been reviewed.    Significant Diagnostics:  I have reviewed all pertinent imaging results/findings within the past 24 hours.    VTE Risk Mitigation (From admission, onward)           Ordered     enoxaparin injection 40 mg  Daily         12/07/24 1542     IP VTE LOW RISK PATIENT  Once         12/07/24 1542     Place sequential compression device  Until discontinued         12/07/24 1542

## 2024-12-10 NOTE — PLAN OF CARE
[facility] - Internal Medicine Telemetry  Discharge Reassessment    Primary Care Provider: Elsi Klein MD    Expected Discharge Date: 12/16/2024    Reassessment (most recent)       Discharge Reassessment - 12/09/24 1600          Discharge Reassessment    Assessment Type Discharge Planning Reassessment     Did the patient's condition or plan change since previous assessment? No     Discharge Plan discussed with: Patient     Discharge Plan A Home with family     Discharge Plan B Home (P)      DME Needed Upon Discharge  none (P)      Transition of Care Barriers None (P)      Why the patient remains in the hospital Requires continued medical care (P)         Post-Acute Status    Coverage [employer] (P)                  Discharge Plan A and Plan B have been determined by review of patient's clinical status, future medical and therapeutic needs, and coverage/benefits for post-acute care in coordination with multidisciplinary team members.     SW met w/ patient at the bedside for dc planning. SW offered to provide patient w/ DAVID forms as requested by attending MD. Patient states that she received forms and her spouse will submit via medical records department; no additional forms needed.     Patient expressed no concerns regarding dispo.  Patient to dc home w/ family pending medical clearance.                     KENNETH Spivey, LMSW  [facility]  Case Management  Ext. [phone]

## 2024-12-10 NOTE — PROGRESS NOTES
[facility] - [facility] Telemetry  Thoracic Surgery  Progress Note    Subjective:     History of Present Illness:  Ann-Marie Saavedra is a 44 y.o. 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 She underwent placement of pigtail chest tube in the ED. Thoracic surgery was consulted for persistent air leak.    Today she reports improvement in dyspnea and chest pain since placement of the chest tube. She is on 2L by NC. She is on Sirolimus, which was started on 12/3.     Post-Op Info:  * No surgery found *         Interval History: Ms. Saavedra reports she is feeling well today. Endorsing some lower back pain, but attributes this to the bed. VSS. Afebrile. Chest tube remains in place with ongoing air leak.     Medications:  Continuous Infusions:  Scheduled Meds:   azelastine  2 spray Nasal BID    doxycycline  50 mg Oral Q12H    enoxparin  40 mg Subcutaneous Daily    senna  8.6 mg Oral Daily    sirolimus  1 mg Oral Daily     PRN Meds:  Current Facility-Administered Medications:     albuterol, 2 puff, Inhalation, Q6H PRN    dextrose 10%, 12.5 g, Intravenous, PRN    dextrose 10%, 25 g, Intravenous, PRN    glucagon (human recombinant), 1 mg, Intramuscular, PRN    glucose, 16 g, Oral, PRN    glucose, 24 g, Oral, PRN    HYDROmorphone, 1 mg, Intravenous, Q4H PRN    naloxone, 0.02 mg, Intravenous, PRN    oxyCODONE, 5 mg, Oral, Q6H PRN    sodium chloride 0.9%, 10 mL, Intravenous, Q12H PRN     Review of patient's allergies indicates:  No Known Allergies  Objective:     Vital Signs (Most Recent):  Temp: 99.1 °F (37.3 °C) (12/10/24 0733)  Pulse: 69 (12/10/24 0733)  Resp: 19 (12/10/24 1043)  BP: 115/70 (12/10/24 0733)  SpO2: 97 % (12/10/24 1043) Vital Signs (24h Range):  Temp:  [98.2 °F (36.8 °C)-99.1 °F (37.3 °C)] 99.1 °F (37.3 °C)  Pulse:  [63-83] 69  Resp:   [17-19] 19  SpO2:  [93 %-98 %] 97 %  BP: ()/(60-70) 115/70     Intake/Output - Last 3 Shifts         12/08 0700  12/09 0659 12/09 0700  12/10 0659 12/10 0700  12/11 0659    P.O. 480 340 240    Total Intake(mL/kg) 480 (8.4) 340 (6) 240 (4.2)    Urine (mL/kg/hr) 2500 (1.8) 3050 (2.2)     Stool  0     Chest Tube 10 0 0    Total Output 2510 3050 0    Net -2030 -2710 +240           Urine Occurrence 2 x 1 x     Stool Occurrence  0 x             SpO2: 97 %        Physical Exam  Vitals reviewed.   Constitutional:       General: She is not in acute distress.     Appearance: She is not toxic-appearing.   HENT:      Head: Normocephalic and atraumatic.   Eyes:      Extraocular Movements: Extraocular movements intact.   Cardiovascular:      Rate and Rhythm: Normal rate.   Pulmonary:      Effort: Pulmonary effort is normal. No respiratory distress.      Comments:   CT to suction with near continuous air leak  Skin:     General: Skin is warm and dry.   Neurological:      General: No focal deficit present.      Mental Status: She is alert.            Significant Labs:  All pertinent labs from the last 24 hours have been reviewed.    Significant Diagnostics:  I have reviewed all pertinent imaging results/findings within the past 24 hours.    VTE Risk Mitigation (From admission, onward)           Ordered     enoxaparin injection 40 mg  Daily         12/07/24 1542     IP VTE LOW RISK PATIENT  Once         12/07/24 1542     Place sequential compression device  Until discontinued         12/07/24 1542                  Assessment/Plan:     Lymphangioleiomyomatosis  Ann-Marie Saavedra is a 44 y.o. female with recently diagnosed lymphangioleiomyomatosis c/b pneumothorax who was admitted following chest tube placement by ED staff on 12/7/24. Thoracic surgery was consulted for evaluation of ongoing air leak.    -To undergo multi-disciplinary discussion at tumor board this week; until then keep chest tube to suction   -Daily chest  x-ray    Thoracic surgery to continue to follow          Chad Diggs MD  Thoracic Surgery  [facility] - [facility] Tele[facility]

## 2024-12-10 NOTE — PROGRESS NOTES
[facility] - Internal Medicine Telemetry  Lung Transplant  Progress Note - Floor    Patient Name: Ann-Marie Saavedra  MRN: [id number]  Admission Date: 12/7/2024  Hospital Length of Stay: 2 days  Post-Operative Day:   Attending Physician: Kendy Fallon MD  Primary Care Provider: Elsi Klein MD     Subjective:     Interval History: No acute events overnight. Still with pneumothorax.      Continuous Infusions:  Scheduled Meds:   azelastine  2 spray Nasal BID    doxycycline  50 mg Oral Q12H    enoxparin  40 mg Subcutaneous Daily    senna  8.6 mg Oral Daily    sirolimus  1 mg Oral Daily     PRN Meds:  Current Facility-Administered Medications:     albuterol, 2 puff, Inhalation, Q6H PRN    dextrose 10%, 12.5 g, Intravenous, PRN    dextrose 10%, 25 g, Intravenous, PRN    glucagon (human recombinant), 1 mg, Intramuscular, PRN    glucose, 16 g, Oral, PRN    glucose, 24 g, Oral, PRN    HYDROmorphone, 1 mg, Intravenous, Q4H PRN    naloxone, 0.02 mg, Intravenous, PRN    oxyCODONE, 5 mg, Oral, Q6H PRN    sodium chloride 0.9%, 10 mL, Intravenous, Q12H PRN    Review of patient's allergies indicates:  No Known Allergies    Review of Systems   Constitutional:  Positive for activity change and fatigue. Negative for chills.   Respiratory:  Positive for chest tightness and shortness of breath.    Cardiovascular:  Negative for palpitations and leg swelling.   Gastrointestinal:  Negative for abdominal distention and abdominal pain.   Musculoskeletal:  Negative for arthralgias and back pain.   Neurological:  Negative for syncope.   Psychiatric/Behavioral:  Negative for agitation and behavioral problems.      Objective:        Physical Exam  Vitals reviewed.   Constitutional:       General: She is not in acute distress.     Appearance: She is not toxic-appearing.   HENT:      Head: Normocephalic and atraumatic.   Eyes:      Extraocular Movements: Extraocular movements intact.      Conjunctiva/sclera: Conjunctivae normal.  "  Cardiovascular:      Rate and Rhythm: Normal rate.   Pulmonary:      Effort: Pulmonary effort is normal. No respiratory distress.      Comments: CT to suction with near continuous air leak  Skin:     General: Skin is warm and dry.   Neurological:      General: No focal deficit present.      Mental Status: She is alert and oriented to person, place, and time.   Psychiatric:         Thought Content: Thought content normal.                Vital Signs (Most Recent):  Temp: 99.6 °F (37.6 °C) (12/10/24 1137)  Pulse: 76 (12/10/24 1137)  Resp: 18 (12/10/24 1137)  BP: 123/76 (12/10/24 1137)  SpO2: 96 % (12/10/24 1137) Vital Signs (24h Range):  Temp:  [98.3 °F (36.8 °C)-99.6 °F (37.6 °C)] 99.6 °F (37.6 °C)  Pulse:  [63-83] 76  Resp:  [17-19] 18  SpO2:  [93 %-97 %] 96 %  BP: ()/(65-76) 123/76     Weight: 57 kg (125 lb 10.6 oz)  Body mass index is 20.28 kg/m².      Intake/Output Summary (Last 24 hours) at 12/10/2024 1358  Last data filed at 12/10/2024 0855  Gross per 24 hour   Intake 580 ml   Output 2150 ml   Net -1570 ml       Significant Labs:  CBC:  Recent Labs   Lab 12/10/24  0237   WBC 5.12   RBC 4.23   HGB 12.7   HCT 38.9      MCV 92   MCH 30.0   MCHC 32.6     BMP:  Recent Labs   Lab 12/10/24  0236      K 3.8      CO2 22*   BUN 11   CREATININE 0.8   CALCIUM 8.5*      Tacrolimus Levels:  No results for input(s): "TACROLIMUS" in the last 72 hours.  Microbiology:  Microbiology Results (last 7 days)       Procedure Component Value Units Date/Time    Culture, Respiratory with Gram Stain [[id number]]     Order Status: No result Specimen: Sputum, Expectorated             I have reviewed all pertinent labs within the past 24 hours.    Diagnostic Results:  X-Ray: Reviewed        Assessment/Plan:     * Secondary spontaneous pneumothorax  Chest tube remains in place on continued suction.  Has persistent air leak.  Thoracic surgery consulted for further intervention.  Supplemental oxygen as needed. General " pulmonary following. Had discussion yesterday between patient and Dr. Brandon regarding HEBERT treatment options and lung transplant candidacy.  Will discuss treatment options in interdisipilinary meeting today with CTS.         Lymphangioleiomyomatosis  VEGF pending.  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  Currently on sirolimus 1 mg daily.          Darius Gamboa NP  Lung Transplant  [facility] - [facility] Medicine Tele[facility]

## 2024-12-10 NOTE — ASSESSMENT & PLAN NOTE
Ann-Marie Saavedra is a 44 y.o. female with recently diagnosed lymphangioleiomyomatosis c/b pneumothorax who was admitted following chest tube placement by ED staff on 12/7/24. Thoracic surgery was consulted for evaluation of ongoing air leak.    -To undergo multi-disciplinary discussion at tumor board this week; until then keep chest tube to suction   -Daily chest x-ray    Thoracic surgery to continue to follow

## 2024-12-10 NOTE — TREATMENT PLAN
Thoracic Surgery Treatment Plan    - Will arrange for VATS with pleurodesis tomorrow, 12/11/24  - NPO at midnight  - Will obtain surgical consent and blood consent the morning of the procedure    Chad Diggs MD  General Surgery, PGY-4

## 2024-12-10 NOTE — PROGRESS NOTES
Hospital Medicine  Progress note    Team: [facility] A Kendy Fallon MD  Admit Date: 12/7/2024  Code status: Full Code    Principal Problem:  Secondary spontaneous pneumothorax    Interval hx:  No new complaints. Pain controlled. She is awaiting treatment plan from lung transplant team.    PEx  Temp:  [98.3 °F (36.8 °C)-99.6 °F (37.6 °C)]   Pulse:  [63-80]   Resp:  [18-19]   BP: ()/(65-76)   SpO2:  [93 %-97 %]     Intake/Output Summary (Last 24 hours) at 12/10/2024 1520  Last data filed at 12/10/2024 1200  Gross per 24 hour   Intake 820 ml   Output 2150 ml   Net -1330 ml       General Appearance: no acute distress, WDWN  Heart: regular rate and rhythm, no heave  Respiratory: Normal respiratory effort, symmetric excursion, bilateral vesicular breath sounds ; decreased breath sounds on right upper lung  Abdomen: Soft, non-tender; bowel sounds active  Skin: intact, no rash, no ulcers  Neurologic:  No focal numbness or weakness  Mental status: Alert, oriented x 4, affect appropriate    Recent Labs   Lab 12/08/24 0436 12/09/24  0238 12/10/24  0237   WBC 6.09 5.18 5.12   HGB 12.9 12.7 12.7   HCT 38.9 37.5 38.9    190 208     Recent Labs   Lab 12/08/24  0436 12/09/24 0238 12/10/24  0236   * 138 139   K 4.7 3.9 3.8    106 108   CO2 20* 24 22*   BUN 11 8 11   CREATININE 0.8 0.7 0.8   GLU 86 95 91   CALCIUM 8.6* 8.3* 8.5*   MG 1.8 1.8 1.9   PHOS 4.1 3.8 3.5     Recent Labs   Lab 12/08/24  0436 12/09/24  0238 12/10/24  0236   ALKPHOS 43 41 44   ALT 15 13 13   AST 20 17 17   ALBUMIN 3.7 3.5 3.6   PROT 6.0 5.7* 6.1   BILITOT 0.6 0.3 0.5      Scheduled Meds:   azelastine  2 spray Nasal BID    doxycycline  50 mg Oral Q12H    enoxparin  40 mg Subcutaneous Daily    senna  8.6 mg Oral Daily    sirolimus  1 mg Oral Daily     Continuous Infusions:  As Needed:    Current Facility-Administered Medications:     albuterol, 2 puff, Inhalation, Q6H PRN    dextrose 10%, 12.5 g, Intravenous, PRN    dextrose 10%, 25  g, Intravenous, PRN    glucagon (human recombinant), 1 mg, Intramuscular, PRN    glucose, 16 g, Oral, PRN    glucose, 24 g, Oral, PRN    HYDROmorphone, 1 mg, Intravenous, Q4H PRN    naloxone, 0.02 mg, Intravenous, PRN    oxyCODONE, 5 mg, Oral, Q6H PRN    sodium chloride 0.9%, 10 mL, Intravenous, Q12H PRN    Assessment and Plan  / Problems managed today    * Secondary spontaneous pneumothorax  Iso lymphangioleiomyomatosis diagnosis  CXR/Ct chest on admission with PTX   S/p chest tube placement in ED  Pulmonology consulted and managing CT  Supplemental O2 via NC  CXR at 1800 - if PTX not improving will get CT chest  Pain control: Oxycodone 5 q 6 PRN and dilaudid 1 q 4 PRN (start prophylactic stool softer)  PTX improved but not resolved and with persistent air leak  CT surgery consulted for assistance in management  Lung transplant consulted    Hyperkalemia  Hyperkalemia is likely due to  Unclear cause .The patients most recent potassium results are listed below.  Recent Labs     12/07/24  1151 12/07/24  1701 12/08/24  0436   K 5.6* 4.1 4.7       Plan  - Monitor for arrhythmias with EKG and/or continuous telemetry.   - Repeat K - if still elevated will treat .   - Monitor potassium: Every 12 hours    Lymphangioleiomyomatosis  Recent diagnosis   Being referred as outpatient to advanced lung disease and lung transplant  C/w sirolimus 1 mg daily  Pulm following    Rosacea  C/w home doxycycline daily        Diet:  regular  GI PPx: not needed  DVT PPx:  enoxaparin  Airways: room air  Drain: chest tube    Goals of Care:  Return to prior functional status     Discharge Planning   DAVID: 12/16/2024   Is the patient medically ready for discharge?:     Reason for patient still in hospital (select all that apply): Patient trending condition, Treatment, and Consult recommendations  Discharge Plan A: Home, Home with family        Kendy Fallon MD

## 2024-12-10 NOTE — SUBJECTIVE & OBJECTIVE
Subjective:     Interval History: No acute events overnight. Still with pneumothorax.      Continuous Infusions:  Scheduled Meds:   azelastine  2 spray Nasal BID    doxycycline  50 mg Oral Q12H    enoxparin  40 mg Subcutaneous Daily    senna  8.6 mg Oral Daily    sirolimus  1 mg Oral Daily     PRN Meds:  Current Facility-Administered Medications:     albuterol, 2 puff, Inhalation, Q6H PRN    dextrose 10%, 12.5 g, Intravenous, PRN    dextrose 10%, 25 g, Intravenous, PRN    glucagon (human recombinant), 1 mg, Intramuscular, PRN    glucose, 16 g, Oral, PRN    glucose, 24 g, Oral, PRN    HYDROmorphone, 1 mg, Intravenous, Q4H PRN    naloxone, 0.02 mg, Intravenous, PRN    oxyCODONE, 5 mg, Oral, Q6H PRN    sodium chloride 0.9%, 10 mL, Intravenous, Q12H PRN    Review of patient's allergies indicates:  No Known Allergies    Review of Systems   Constitutional:  Positive for activity change and fatigue. Negative for chills.   Respiratory:  Positive for chest tightness and shortness of breath.    Cardiovascular:  Negative for palpitations and leg swelling.   Gastrointestinal:  Negative for abdominal distention and abdominal pain.   Musculoskeletal:  Negative for arthralgias and back pain.   Neurological:  Negative for syncope.   Psychiatric/Behavioral:  Negative for agitation and behavioral problems.      Objective:        Physical Exam  Vitals reviewed.   Constitutional:       General: She is not in acute distress.     Appearance: She is not toxic-appearing.   HENT:      Head: Normocephalic and atraumatic.   Eyes:      Extraocular Movements: Extraocular movements intact.      Conjunctiva/sclera: Conjunctivae normal.   Cardiovascular:      Rate and Rhythm: Normal rate.   Pulmonary:      Effort: Pulmonary effort is normal. No respiratory distress.      Comments: CT to suction with near continuous air leak  Skin:     General: Skin is warm and dry.   Neurological:      General: No focal deficit present.      Mental Status: She is  "alert and oriented to person, place, and time.   Psychiatric:         Thought Content: Thought content normal.                Vital Signs (Most Recent):  Temp: 99.6 °F (37.6 °C) (12/10/24 1137)  Pulse: 76 (12/10/24 1137)  Resp: 18 (12/10/24 1137)  BP: 123/76 (12/10/24 1137)  SpO2: 96 % (12/10/24 1137) Vital Signs (24h Range):  Temp:  [98.3 °F (36.8 °C)-99.6 °F (37.6 °C)] 99.6 °F (37.6 °C)  Pulse:  [63-83] 76  Resp:  [17-19] 18  SpO2:  [93 %-97 %] 96 %  BP: ()/(65-76) 123/76     Weight: 57 kg (125 lb 10.6 oz)  Body mass index is 20.28 kg/m².      Intake/Output Summary (Last 24 hours) at 12/10/2024 1358  Last data filed at 12/10/2024 0855  Gross per 24 hour   Intake 580 ml   Output 2150 ml   Net -1570 ml       Significant Labs:  CBC:  Recent Labs   Lab 12/10/24  0237   WBC 5.12   RBC 4.23   HGB 12.7   HCT 38.9      MCV 92   MCH 30.0   MCHC 32.6     BMP:  Recent Labs   Lab 12/10/24  0236      K 3.8      CO2 22*   BUN 11   CREATININE 0.8   CALCIUM 8.5*      Tacrolimus Levels:  No results for input(s): "TACROLIMUS" in the last 72 hours.  Microbiology:  Microbiology Results (last 7 days)       Procedure Component Value Units Date/Time    Culture, Respiratory with Gram Stain [[id number]]     Order Status: No result Specimen: Sputum, Expectorated             I have reviewed all pertinent labs within the past 24 hours.    Diagnostic Results:  X-Ray: Reviewed        "

## 2024-12-11 ENCOUNTER — ANESTHESIA (OUTPATIENT)
Dept: SURGERY | Facility: HOSPITAL | Age: 44
End: 2024-12-11
Payer: COMMERCIAL

## 2024-12-11 LAB
ABO + RH BLD: NORMAL
BLD GP AB SCN CELLS X3 SERPL QL: NORMAL
SPECIMEN OUTDATE: NORMAL

## 2024-12-11 PROCEDURE — 25000003 PHARM REV CODE 250: Mod: NTX | Performed by: STUDENT IN AN ORGANIZED HEALTH CARE EDUCATION/TRAINING PROGRAM

## 2024-12-11 PROCEDURE — 63600175 PHARM REV CODE 636 W HCPCS: Mod: NTX | Performed by: STUDENT IN AN ORGANIZED HEALTH CARE EDUCATION/TRAINING PROGRAM

## 2024-12-11 PROCEDURE — 86901 BLOOD TYPING SEROLOGIC RH(D): CPT | Mod: NTX

## 2024-12-11 PROCEDURE — 3E0T3BZ INTRODUCTION OF ANESTHETIC AGENT INTO PERIPHERAL NERVES AND PLEXI, PERCUTANEOUS APPROACH: ICD-10-PCS | Performed by: STUDENT IN AN ORGANIZED HEALTH CARE EDUCATION/TRAINING PROGRAM

## 2024-12-11 PROCEDURE — 27201423 OPTIME MED/SURG SUP & DEVICES STERILE SUPPLY: Mod: NTX | Performed by: STUDENT IN AN ORGANIZED HEALTH CARE EDUCATION/TRAINING PROGRAM

## 2024-12-11 PROCEDURE — 36415 COLL VENOUS BLD VENIPUNCTURE: CPT | Mod: NTX

## 2024-12-11 PROCEDURE — 37000009 HC ANESTHESIA EA ADD 15 MINS: Mod: NTX | Performed by: STUDENT IN AN ORGANIZED HEALTH CARE EDUCATION/TRAINING PROGRAM

## 2024-12-11 PROCEDURE — 36000711: Mod: NTX | Performed by: STUDENT IN AN ORGANIZED HEALTH CARE EDUCATION/TRAINING PROGRAM

## 2024-12-11 PROCEDURE — 71000016 HC POSTOP RECOV ADDL HR: Mod: NTX | Performed by: STUDENT IN AN ORGANIZED HEALTH CARE EDUCATION/TRAINING PROGRAM

## 2024-12-11 PROCEDURE — 63600175 PHARM REV CODE 636 W HCPCS: Mod: JZ,JG,NTX | Performed by: STUDENT IN AN ORGANIZED HEALTH CARE EDUCATION/TRAINING PROGRAM

## 2024-12-11 PROCEDURE — 25000003 PHARM REV CODE 250: Mod: NTX

## 2024-12-11 PROCEDURE — 63600175 PHARM REV CODE 636 W HCPCS: Mod: NTX

## 2024-12-11 PROCEDURE — 3E0L4GC INTRODUCTION OF OTHER THERAPEUTIC SUBSTANCE INTO PLEURAL CAVITY, PERCUTANEOUS ENDOSCOPIC APPROACH: ICD-10-PCS | Performed by: STUDENT IN AN ORGANIZED HEALTH CARE EDUCATION/TRAINING PROGRAM

## 2024-12-11 PROCEDURE — 36000710: Mod: NTX | Performed by: STUDENT IN AN ORGANIZED HEALTH CARE EDUCATION/TRAINING PROGRAM

## 2024-12-11 PROCEDURE — 37000008 HC ANESTHESIA 1ST 15 MINUTES: Mod: NTX | Performed by: STUDENT IN AN ORGANIZED HEALTH CARE EDUCATION/TRAINING PROGRAM

## 2024-12-11 PROCEDURE — 71000033 HC RECOVERY, INTIAL HOUR: Mod: NTX | Performed by: STUDENT IN AN ORGANIZED HEALTH CARE EDUCATION/TRAINING PROGRAM

## 2024-12-11 PROCEDURE — 71000015 HC POSTOP RECOV 1ST HR: Mod: NTX | Performed by: STUDENT IN AN ORGANIZED HEALTH CARE EDUCATION/TRAINING PROGRAM

## 2024-12-11 PROCEDURE — 0B5N4ZZ DESTRUCTION OF RIGHT PLEURA, PERCUTANEOUS ENDOSCOPIC APPROACH: ICD-10-PCS | Performed by: STUDENT IN AN ORGANIZED HEALTH CARE EDUCATION/TRAINING PROGRAM

## 2024-12-11 PROCEDURE — 11000001 HC ACUTE MED/SURG PRIVATE ROOM: Mod: NTX

## 2024-12-11 PROCEDURE — C1729 CATH, DRAINAGE: HCPCS | Mod: NTX | Performed by: STUDENT IN AN ORGANIZED HEALTH CARE EDUCATION/TRAINING PROGRAM

## 2024-12-11 RX ORDER — PROCHLORPERAZINE EDISYLATE 5 MG/ML
INJECTION INTRAMUSCULAR; INTRAVENOUS
Status: DISCONTINUED | OUTPATIENT
Start: 2024-12-11 | End: 2024-12-11

## 2024-12-11 RX ORDER — LIDOCAINE HYDROCHLORIDE 20 MG/ML
INJECTION INTRAVENOUS
Status: DISCONTINUED | OUTPATIENT
Start: 2024-12-11 | End: 2024-12-11

## 2024-12-11 RX ORDER — EPHEDRINE SULFATE 50 MG/ML
INJECTION, SOLUTION INTRAVENOUS
Status: DISCONTINUED | OUTPATIENT
Start: 2024-12-11 | End: 2024-12-11

## 2024-12-11 RX ORDER — DEXMEDETOMIDINE HYDROCHLORIDE 100 UG/ML
INJECTION, SOLUTION INTRAVENOUS CONTINUOUS PRN
Status: DISCONTINUED | OUTPATIENT
Start: 2024-12-11 | End: 2024-12-11

## 2024-12-11 RX ORDER — DOXYCYCLINE 100 MG/10ML
INJECTION, POWDER, LYOPHILIZED, FOR SOLUTION INTRAVENOUS
Status: DISCONTINUED | OUTPATIENT
Start: 2024-12-11 | End: 2024-12-11 | Stop reason: HOSPADM

## 2024-12-11 RX ORDER — PHENYLEPHRINE HYDROCHLORIDE 10 MG/ML
INJECTION INTRAVENOUS
Status: DISCONTINUED | OUTPATIENT
Start: 2024-12-11 | End: 2024-12-11

## 2024-12-11 RX ORDER — KETAMINE HCL IN 0.9 % NACL 50 MG/5 ML
SYRINGE (ML) INTRAVENOUS
Status: DISCONTINUED | OUTPATIENT
Start: 2024-12-11 | End: 2024-12-11

## 2024-12-11 RX ORDER — ACETAMINOPHEN 500 MG
1000 TABLET ORAL EVERY 8 HOURS
Status: DISCONTINUED | OUTPATIENT
Start: 2024-12-11 | End: 2024-12-18 | Stop reason: HOSPADM

## 2024-12-11 RX ORDER — GLUCAGON 1 MG
1 KIT INJECTION
Status: DISCONTINUED | OUTPATIENT
Start: 2024-12-11 | End: 2024-12-11

## 2024-12-11 RX ORDER — HYDROMORPHONE HYDROCHLORIDE 2 MG/ML
INJECTION, SOLUTION INTRAMUSCULAR; INTRAVENOUS; SUBCUTANEOUS
Status: DISCONTINUED | OUTPATIENT
Start: 2024-12-11 | End: 2024-12-11

## 2024-12-11 RX ORDER — CEFAZOLIN SODIUM 1 G/3ML
INJECTION, POWDER, FOR SOLUTION INTRAMUSCULAR; INTRAVENOUS
Status: DISCONTINUED | OUTPATIENT
Start: 2024-12-11 | End: 2024-12-11

## 2024-12-11 RX ORDER — OXYCODONE HYDROCHLORIDE 5 MG/1
5 TABLET ORAL EVERY 4 HOURS PRN
Status: DISCONTINUED | OUTPATIENT
Start: 2024-12-11 | End: 2024-12-12

## 2024-12-11 RX ORDER — METHOCARBAMOL 100 MG/ML
1000 INJECTION, SOLUTION INTRAMUSCULAR; INTRAVENOUS ONCE
Status: COMPLETED | OUTPATIENT
Start: 2024-12-11 | End: 2024-12-11

## 2024-12-11 RX ORDER — SODIUM CHLORIDE 0.9 % (FLUSH) 0.9 %
10 SYRINGE (ML) INJECTION
Status: DISCONTINUED | OUTPATIENT
Start: 2024-12-11 | End: 2024-12-11

## 2024-12-11 RX ORDER — OXYCODONE HCL 5 MG/5 ML
5 SOLUTION, ORAL ORAL EVERY 4 HOURS PRN
Status: DISCONTINUED | OUTPATIENT
Start: 2024-12-11 | End: 2024-12-11

## 2024-12-11 RX ORDER — MIDAZOLAM HYDROCHLORIDE 1 MG/ML
INJECTION INTRAMUSCULAR; INTRAVENOUS
Status: DISCONTINUED | OUTPATIENT
Start: 2024-12-11 | End: 2024-12-11

## 2024-12-11 RX ORDER — ROCURONIUM BROMIDE 10 MG/ML
INJECTION, SOLUTION INTRAVENOUS
Status: DISCONTINUED | OUTPATIENT
Start: 2024-12-11 | End: 2024-12-11

## 2024-12-11 RX ORDER — GABAPENTIN 100 MG/1
200 CAPSULE ORAL NIGHTLY
Status: DISCONTINUED | OUTPATIENT
Start: 2024-12-11 | End: 2024-12-12

## 2024-12-11 RX ORDER — FENTANYL CITRATE 50 UG/ML
INJECTION, SOLUTION INTRAMUSCULAR; INTRAVENOUS
Status: DISCONTINUED | OUTPATIENT
Start: 2024-12-11 | End: 2024-12-11

## 2024-12-11 RX ORDER — HALOPERIDOL 5 MG/ML
0.5 INJECTION INTRAMUSCULAR EVERY 10 MIN PRN
Status: DISCONTINUED | OUTPATIENT
Start: 2024-12-11 | End: 2024-12-11

## 2024-12-11 RX ORDER — ONDANSETRON HYDROCHLORIDE 2 MG/ML
INJECTION, SOLUTION INTRAVENOUS
Status: DISCONTINUED | OUTPATIENT
Start: 2024-12-11 | End: 2024-12-11

## 2024-12-11 RX ORDER — BUPIVACAINE HYDROCHLORIDE 2.5 MG/ML
INJECTION, SOLUTION EPIDURAL; INFILTRATION; INTRACAUDAL
Status: DISCONTINUED | OUTPATIENT
Start: 2024-12-11 | End: 2024-12-11 | Stop reason: HOSPADM

## 2024-12-11 RX ORDER — MAGNESIUM SULFATE HEPTAHYDRATE 500 MG/ML
INJECTION, SOLUTION INTRAMUSCULAR; INTRAVENOUS
Status: DISCONTINUED | OUTPATIENT
Start: 2024-12-11 | End: 2024-12-11

## 2024-12-11 RX ORDER — HYDROMORPHONE HYDROCHLORIDE 1 MG/ML
0.2 INJECTION, SOLUTION INTRAMUSCULAR; INTRAVENOUS; SUBCUTANEOUS EVERY 5 MIN PRN
Status: DISCONTINUED | OUTPATIENT
Start: 2024-12-11 | End: 2024-12-11

## 2024-12-11 RX ORDER — DEXAMETHASONE SODIUM PHOSPHATE 4 MG/ML
INJECTION, SOLUTION INTRA-ARTICULAR; INTRALESIONAL; INTRAMUSCULAR; INTRAVENOUS; SOFT TISSUE
Status: DISCONTINUED | OUTPATIENT
Start: 2024-12-11 | End: 2024-12-11

## 2024-12-11 RX ORDER — OXYCODONE HYDROCHLORIDE 10 MG/1
10 TABLET ORAL EVERY 4 HOURS PRN
Status: DISCONTINUED | OUTPATIENT
Start: 2024-12-11 | End: 2024-12-12

## 2024-12-11 RX ORDER — HYDROMORPHONE HYDROCHLORIDE 1 MG/ML
0.5 INJECTION, SOLUTION INTRAMUSCULAR; INTRAVENOUS; SUBCUTANEOUS EVERY 6 HOURS PRN
Status: DISCONTINUED | OUTPATIENT
Start: 2024-12-11 | End: 2024-12-12

## 2024-12-11 RX ORDER — PROPOFOL 10 MG/ML
VIAL (ML) INTRAVENOUS
Status: DISCONTINUED | OUTPATIENT
Start: 2024-12-11 | End: 2024-12-11

## 2024-12-11 RX ADMIN — ROCURONIUM BROMIDE 20 MG: 10 INJECTION, SOLUTION INTRAVENOUS at 02:12

## 2024-12-11 RX ADMIN — SODIUM CHLORIDE, SODIUM GLUCONATE, SODIUM ACETATE, POTASSIUM CHLORIDE, MAGNESIUM CHLORIDE, SODIUM PHOSPHATE, DIBASIC, AND POTASSIUM PHOSPHATE: .53; .5; .37; .037; .03; .012; .00082 INJECTION, SOLUTION INTRAVENOUS at 01:12

## 2024-12-11 RX ADMIN — CEFAZOLIN 2 G: 330 INJECTION, POWDER, FOR SOLUTION INTRAMUSCULAR; INTRAVENOUS at 02:12

## 2024-12-11 RX ADMIN — GABAPENTIN 200 MG: 100 CAPSULE ORAL at 08:12

## 2024-12-11 RX ADMIN — MIDAZOLAM HYDROCHLORIDE 2 MG: 2 INJECTION, SOLUTION INTRAMUSCULAR; INTRAVENOUS at 01:12

## 2024-12-11 RX ADMIN — MAGNESIUM SULFATE HEPTAHYDRATE 2 G: 500 INJECTION, SOLUTION INTRAMUSCULAR; INTRAVENOUS at 02:12

## 2024-12-11 RX ADMIN — Medication 25 MG: at 03:12

## 2024-12-11 RX ADMIN — FENTANYL CITRATE 100 MCG: 50 INJECTION, SOLUTION INTRAMUSCULAR; INTRAVENOUS at 01:12

## 2024-12-11 RX ADMIN — PHENYLEPHRINE HYDROCHLORIDE 200 MCG: 10 INJECTION INTRAVENOUS at 02:12

## 2024-12-11 RX ADMIN — HYDROMORPHONE HYDROCHLORIDE 0.2 MG: 1 INJECTION, SOLUTION INTRAMUSCULAR; INTRAVENOUS; SUBCUTANEOUS at 04:12

## 2024-12-11 RX ADMIN — DEXMEDETOMIDINE HYDROCHLORIDE 0.2 MCG/KG/HR: 100 INJECTION, SOLUTION, CONCENTRATE INTRAVENOUS at 02:12

## 2024-12-11 RX ADMIN — PROPOFOL 30 MG: 10 INJECTION, EMULSION INTRAVENOUS at 02:12

## 2024-12-11 RX ADMIN — Medication 20 MG: at 02:12

## 2024-12-11 RX ADMIN — DEXMEDETOMIDINE HYDROCHLORIDE 16 MCG: 100 INJECTION, SOLUTION INTRAVENOUS at 01:12

## 2024-12-11 RX ADMIN — ROCURONIUM BROMIDE 30 MG: 10 INJECTION, SOLUTION INTRAVENOUS at 02:12

## 2024-12-11 RX ADMIN — DEXMEDETOMIDINE HYDROCHLORIDE 8 MCG: 100 INJECTION, SOLUTION INTRAVENOUS at 02:12

## 2024-12-11 RX ADMIN — HYDROMORPHONE HYDROCHLORIDE 0.2 MG: 2 INJECTION INTRAMUSCULAR; INTRAVENOUS; SUBCUTANEOUS at 04:12

## 2024-12-11 RX ADMIN — OXYCODONE 5 MG: 5 TABLET ORAL at 04:12

## 2024-12-11 RX ADMIN — DEXMEDETOMIDINE HYDROCHLORIDE 0.2 MCG/KG/HR: 100 INJECTION, SOLUTION INTRAVENOUS at 01:12

## 2024-12-11 RX ADMIN — HYDROMORPHONE HYDROCHLORIDE 0.5 MG: 1 INJECTION, SOLUTION INTRAMUSCULAR; INTRAVENOUS; SUBCUTANEOUS at 06:12

## 2024-12-11 RX ADMIN — DOXYCYCLINE 50 MG: 50 CAPSULE ORAL at 08:12

## 2024-12-11 RX ADMIN — SODIUM CHLORIDE: 9 INJECTION, SOLUTION INTRAVENOUS at 01:12

## 2024-12-11 RX ADMIN — HYDROMORPHONE HYDROCHLORIDE 0.1 MG: 2 INJECTION INTRAMUSCULAR; INTRAVENOUS; SUBCUTANEOUS at 03:12

## 2024-12-11 RX ADMIN — SODIUM CHLORIDE, SODIUM GLUCONATE, SODIUM ACETATE, POTASSIUM CHLORIDE, MAGNESIUM CHLORIDE, SODIUM PHOSPHATE, DIBASIC, AND POTASSIUM PHOSPHATE: .53; .5; .37; .037; .03; .012; .00082 INJECTION, SOLUTION INTRAVENOUS at 02:12

## 2024-12-11 RX ADMIN — SUGAMMADEX 200 MG: 100 INJECTION, SOLUTION INTRAVENOUS at 03:12

## 2024-12-11 RX ADMIN — PHENYLEPHRINE HYDROCHLORIDE 200 MCG: 10 INJECTION INTRAVENOUS at 03:12

## 2024-12-11 RX ADMIN — OXYCODONE HYDROCHLORIDE 10 MG: 10 TABLET ORAL at 08:12

## 2024-12-11 RX ADMIN — LIDOCAINE HYDROCHLORIDE 100 MG: 20 INJECTION INTRAVENOUS at 01:12

## 2024-12-11 RX ADMIN — EPHEDRINE SULFATE 10 MG: 50 INJECTION INTRAVENOUS at 03:12

## 2024-12-11 RX ADMIN — ENOXAPARIN SODIUM 40 MG: 40 INJECTION SUBCUTANEOUS at 05:12

## 2024-12-11 RX ADMIN — ACETAMINOPHEN 1000 MG: 500 TABLET ORAL at 09:12

## 2024-12-11 RX ADMIN — ONDANSETRON 4 MG: 2 INJECTION INTRAMUSCULAR; INTRAVENOUS at 03:12

## 2024-12-11 RX ADMIN — DEXAMETHASONE SODIUM PHOSPHATE 8 MG: 4 INJECTION, SOLUTION INTRAMUSCULAR; INTRAVENOUS at 03:12

## 2024-12-11 RX ADMIN — OXYCODONE HYDROCHLORIDE 5 MG: 5 TABLET ORAL at 11:12

## 2024-12-11 RX ADMIN — Medication 30 MG: at 01:12

## 2024-12-11 RX ADMIN — ROCURONIUM BROMIDE 50 MG: 10 INJECTION, SOLUTION INTRAVENOUS at 01:12

## 2024-12-11 RX ADMIN — PROCHLORPERAZINE EDISYLATE 5 MG: 5 INJECTION INTRAMUSCULAR; INTRAVENOUS at 03:12

## 2024-12-11 RX ADMIN — EPHEDRINE SULFATE 5 MG: 50 INJECTION INTRAVENOUS at 03:12

## 2024-12-11 RX ADMIN — METHOCARBAMOL 1000 MG: 100 INJECTION INTRAMUSCULAR; INTRAVENOUS at 03:12

## 2024-12-11 RX ADMIN — PHENYLEPHRINE HYDROCHLORIDE 0.2 MCG/KG/MIN: 10 INJECTION INTRAVENOUS at 01:12

## 2024-12-11 RX ADMIN — PROPOFOL 150 MG: 10 INJECTION, EMULSION INTRAVENOUS at 01:12

## 2024-12-11 NOTE — PLAN OF CARE
Problem: Adult Inpatient Plan of Care  Goal: Plan of Care Review  Outcome: Progressing     Problem: Respiratory Compromise (Pneumothorax)  Goal: Optimal Oxygenation and Ventilation  Outcome: Progressing

## 2024-12-11 NOTE — NURSING
Attempted to administer scheduled 2100 dose of Doxycycline to patient. Patient states she normally takes this med once a day and would like clarification from the primary team tomorrow as to why it is ordered q12hrs prior to taking. Med returned to the Pyxis.

## 2024-12-11 NOTE — BRIEF OP NOTE
[facility] - Surgery ([facility])  Brief Operative Note    SUMMARY     Surgery Date: 12/11/2024     Surgeons and Role:     * Gerson Alvarado MD - Primary     * Chad Diggs MD - Resident - Assisting     * Bienvenido Szymanski DO - Fellow        Pre-op Diagnosis:  Pneumothorax [J93.9]  Secondary spontaneous pneumothorax [J93.12]  Lymphangioleiomyomatosis [J84.81]    Post-op Diagnosis:  Post-Op Diagnosis Codes:     * Pneumothorax [J93.9]     * Secondary spontaneous pneumothorax [J93.12]     * Lymphangioleiomyomatosis [J84.81]    Procedure(s) (LRB):  VATS, WITH MECHANICAL AND CHEMICAL PLEURODESIS (Right)  BLOCK, NERVE, INTERCOSTAL, 2 OR MORE (Right)    Anesthesia: General/MAC    Implants:  * No implants in log *    Operative Findings: Cystic, friable lung parenchyma. No significant intrathoracic adhesions.     Estimated Blood Loss: 5 cc           Specimens:   Specimen (24h ago, onward)      None            [id number]

## 2024-12-11 NOTE — ANESTHESIA PREPROCEDURE EVALUATION
[facility]  Anesthesia Pre-Operative Evaluation         Patient Name: Ann-Marie Saavedra  YOB: 1980  MRN: [id number]    SUBJECTIVE:     Pre-operative evaluation for Procedure(s) (LRB):  VATS, WITH PLEURODESIS (Right)     12/10/2024    Ann-Marie Saavedra is a 44 y.o. 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 No other pertinent past medical history and denies any surgical history.    Patient now presents for the above procedure(s).    Echo Summary  Results for orders placed during the hospital encounter of 12/07/24    Echo    Interpretation Summary    Left Ventricle: The left ventricle is normal in size. Normal wall thickness. Septal motion is abnormal. There is normal systolic function with a visually estimated ejection fraction of 55 - 60%. There is normal diastolic function. Normal left ventricular filling pressure.    Right Ventricle: Normal right ventricular cavity size. Right ventricle wall motion  is normal. Systolic function is normal.    Left Atrium: Left atrium is mildly dilated.    Mitral Valve: There is trivial-mild regurgitation.    Tricuspid Valve: There is trace regurgitation.    IVC/SVC: Intermediate venous pressure at 8 mmHg.       Prev airway: None documented.    LDA:        Peripheral IV - Single Lumen 12/07/24 1149 Left Antecubital (Active)   Site Assessment Clean;Dry;Intact 12/10/24 1600   Line Securement Device Secured with sutureless device 12/10/24 0800   Extremity Assessment Distal to IV No abnormal discoloration;No redness;No swelling;No warmth 12/10/24 0800   Line Status Saline locked 12/10/24 1600   Dressing Status Clean;Dry;Intact 12/10/24 1600   Dressing Intervention Integrity maintained 12/10/24 1600   Dressing Change Due 12/11/24 12/10/24 0800   Site Change Due 12/11/24 12/10/24 0800   Reason Not Rotated Not due 12/10/24 0800   Number of  days: 3            Chest Tube 12/07/24 1410 Tube - 1 Right (Active)   Chest Tube WDL WDL 12/10/24 0855   Function To water seal 12/10/24 0855   Air Leak/Fluctuation air leak not present 12/10/24 0855   Safety all tubing connections taped;all connections secured;suction checked 12/10/24 0855   Securement tubing secured to body distal to insertion site w/ anchoring device 12/10/24 0855   Dressing Appearance clean and dry 12/10/24 0855   Site Assessment Clean;Dry;Intact;No redness;No swelling;No warmth;No drainage 12/10/24 1600   Surrounding Skin Dry;Intact 12/10/24 1600   Output (mL) 0 mL 12/10/24 1600   Number of days: 3       Female External Urinary Catheter w/ Suction 12/08/24 0800 (Active)   Skin no redness;no breakdown 12/10/24 0800   Tolerance no signs/symptoms of discomfort 12/10/24 0800   Suction Continuous suction at 40 mmHg 12/10/24 0800   Date of last wick change 12/10/24 12/10/24 1600   Time of last wick change 1643 12/10/24 1600   Output (mL) 0 mL 12/10/24 1600   Number of days: 2       Drips: None documented.      Patient Active Problem List   Diagnosis    Rosacea    Shortness of breath    Lymphangioleiomyomatosis    Secondary spontaneous pneumothorax    Hyperkalemia    Acute respiratory failure with hypoxia       Review of patient's allergies indicates:  No Known Allergies    Current Inpatient Medications:   azelastine  2 spray Nasal BID    doxycycline  50 mg Oral Q12H    enoxparin  40 mg Subcutaneous Daily    senna  8.6 mg Oral Daily    sirolimus  1 mg Oral Daily       No current facility-administered medications on file prior to encounter.     Current Outpatient Medications on File Prior to Encounter   Medication Sig Dispense Refill    doxycycline (ORACEA) 40 mg capsule Take 40 mg by mouth once daily.      sirolimus (RAPAMUNE) 1 MG Tab Take 1 tablet (1 mg total) by mouth once daily. 30 tablet 11    albuterol (VENTOLIN HFA) 90 mcg/actuation inhaler Inhale 2 puffs into the lungs every 6 (six) hours as  needed for Wheezing or Shortness of Breath. Rescue 18 g 2    azelastine (ASTELIN) 137 mcg (0.1 %) nasal spray 2 sprays (274 mcg total) by Nasal route 2 (two) times daily. 30 mL 2    levocetirizine (XYZAL) 5 MG tablet Take 1 tablet (5 mg total) by mouth every evening. 90 tablet 1    multivitamin (THERAGRAN) per tablet Take 1 tablet by mouth once daily.         Past Surgical History:   Procedure Laterality Date    CYST REMOVAL Right 2009    chest wall    FOOT TENDON SURGERY Bilateral 2008    bunion       Social History:  Tobacco Use: Low Risk  (12/7/2024)    Patient History     Smoking Tobacco Use: Never     Smokeless Tobacco Use: Never     Passive Exposure: Not on file      Alcohol Use: Not At Risk (12/7/2024)    AUDIT-C     Frequency of Alcohol Consumption: 2-4 times a month     Average Number of Drinks: 1 or 2     Frequency of Binge Drinking: Never        OBJECTIVE:     Vital Signs Range (Last 24H):  Temp:  [36.6 °C (97.8 °F)-37.6 °C (99.6 °F)]   Pulse:  [63-85]   Resp:  [18-19]   BP: ()/(65-79)   SpO2:  [93 %-97 %]       Significant Labs:  Lab Results   Component Value Date    WBC 5.12 12/10/2024    HGB 12.7 12/10/2024    HCT 38.9 12/10/2024     12/10/2024    CHOL 165 12/02/2024    TRIG 56 12/02/2024    HDL 71 12/02/2024    ALT 13 12/10/2024    AST 17 12/10/2024     12/10/2024    K 3.8 12/10/2024     12/10/2024    CREATININE 0.8 12/10/2024    BUN 11 12/10/2024    CO2 22 (L) 12/10/2024    TSH 1.546 04/25/2024    HGBA1C 5.0 12/02/2024       Diagnostic Studies: No relevant studies.    EKG:   Results for orders placed or performed during the hospital encounter of 12/07/24   EKG 12-lead    Collection Time: 12/07/24 10:12 AM   Result Value Ref Range    QRS Duration 74 ms    [facility] QTC Calculation 413 ms    Narrative    Test Reason : R07.9,    Vent. Rate :  75 BPM     Atrial Rate :  75 BPM     P-R Int : 130 ms          QRS Dur :  74 ms      QT Int : 370 ms       P-R-T Axes :  76  83  60 degrees     QTcB Int : 413 ms    Normal sinus rhythm  Normal ECG  When compared with ECG of 25-Apr-2024 10:32,  No significant change was found  Confirmed by Tawanda Whitmore ([id number]) on 12/8/2024 12:59:18 PM    Referred By: LAWSONERRAL SELF           Confirmed By: Tawanda Whitmore       2D ECHO:  TTE:  Results for orders placed or performed during the hospital encounter of 12/07/24   Echo   Result Value Ref Range    BSA 1.59 m2    LVOT stroke volume 63.5 cm3    LV Systolic Volume Index 15.2 mL/m2    LV Diastolic Volume Index 33.32 mL/m2    LVOT area 2.8 cm2    FS 27.8 (A) 28 - 44 %    Left Ventricle Relative Wall Thickness 0.39 cm    LV mass 78.8 g    LV Mass Index 48.9 g/m2    E/E' ratio 4.05 m/s    MONIKA 26.0 mL/m2    LA Vol 41.90 cm3    RV/LV Ratio 0.75 cm    MONIKA (MOD) 38.4 mL/m2    AV Velocity Ratio 1.00     AV index (prosthetic) 0.95     CINDY by Velocity Ratio 2.8 cm²    Mean e' 0.20 m/s    ZLVIDS -0.68     ZLVIDD -2.38     LV Diastolic Volume 53.64 mL    Echo EF Estimated 54 %    LV Systolic Volume 24.51 mL    IVS 0.9 0.6 - 1.1 cm    LVIDd 3.6 3.5 - 6.0 cm    LVIDs 2.6 2.1 - 4.0 cm    LVOT diameter 1.9 cm    PW 0.7 0.6 - 1.1 cm    IVRT 94.20 ms    AV LVOT peak gradient 5 mmHg    LVOT mn grad 3 mmHg    LVOT peak chu 1.1 m/s    LVOT peak VTI 22.4 cm    RV- poe basal diam 2.7 cm    RV S' 15.65 cm/s    LA size 2.50 cm    Left Atrium Major Axis 5.20 cm    Left Atrium Minor Axis 5.15 cm    LA Vol (MOD) 61.85 mL    RA Major [place] 3.72 cm    AV valve area 2.7 cm2    AV area by cont VTI 2.6 cm2    AV peak gradient 4.8 mmHg    AV mean gradient 3.0 mmHg    Ao peak chu 1.1 m/s    Ao VTI 23.6 cm    MV Peak A Chu 0.44 m/s    E wave deceleration time 159.08 ms    MV Peak E Chu 0.79 m/s    E/A ratio 1.80     LV LATERAL E/E' RATIO 3.76     LV SEPTAL E/E' RATIO 4.39     TDI LATERAL 0.21 m/s    TDI SEPTAL 0.18 m/s    Ascending aorta 2.55 cm    STJ 2.29 cm    Sinus 2.68 cm    LA WIDTH 3.81 cm    RA Width 3.20 cm    TAPSE 2.21 cm    Est. RA pres  8 mmHg    IVC diameter 2.3 cm    IVC size sniff 1.0 cm    TASV 15.0 cm/s    Narrative      Left Ventricle: The left ventricle is normal in size. Normal wall   thickness. Septal motion is abnormal. There is normal systolic function   with a visually estimated ejection fraction of 55 - 60%. There is normal   diastolic function. Normal left ventricular filling pressure.    Right Ventricle: Normal right ventricular cavity size. Right ventricle   wall motion  is normal. Systolic function is normal.    Left Atrium: Left atrium is mildly dilated.    Mitral Valve: There is trivial-mild regurgitation.    Tricuspid Valve: There is trace regurgitation.    IVC/SVC: Intermediate venous pressure at 8 mmHg.         XAVI:  No results found for this or any previous visit.    ASSESSMENT/PLAN:       Pre-op Assessment    I have reviewed the Patient Summary Reports.     I have reviewed the Nursing Notes. I have reviewed the NPO Status.   I have reviewed the Medications.     Review of Systems  Anesthesia Hx:  No problems with previous Anesthesia             Denies Family Hx of Anesthesia complications.    Denies Personal Hx of Anesthesia complications.                    Hematology/Oncology:  Hematology Normal   Oncology Normal                                   EENT/Dental:  EENT/Dental Normal           Cardiovascular:  Cardiovascular Normal       Denies MI.  Denies CAD.       Denies Angina.  Denies CHF.                                   Pulmonary:      Shortness of breath   Denies Sleep Apnea.                Renal/:  Renal/ Normal                 Hepatic/GI:  Hepatic/GI Normal     Denies GERD.                Musculoskeletal:  Musculoskeletal Normal                Neurological:  Neurology Normal                                      Endocrine:  Endocrine Normal          Denies Obesity / BMI > 30  Psych:  Psychiatric Normal                    Physical Exam  General: Well nourished, Cooperative, Alert and Oriented    Airway:  Mallampati:  I   Mouth Opening: Normal  TM Distance: Normal  Tongue: Normal  Neck ROM: Normal ROM    Dental:  Intact    Chest/Lungs:  Normal Respiratory Rate    Heart:  Rate: Normal        Anesthesia Plan  Type of Anesthesia, risks & benefits discussed:    Anesthesia Type: Gen ETT  Intra-op Monitoring Plan: Standard ASA Monitors  Post Op Pain Control Plan: multimodal analgesia  Induction:  IV  Airway Plan: Video  Informed Consent: Informed consent signed with the Patient and all parties understand the risks and agree with anesthesia plan.  All questions answered.   ASA Score: 3  Day of Surgery Review of History & Physical: H&P Update referred to the surgeon/provider.  Anesthesia Plan Notes: NPO confirmed  Secondary spontaneous pneumothorax noted, drainage tube in place  Plan double lumen tube    Ready For Surgery From Anesthesia Perspective.     .

## 2024-12-11 NOTE — ANESTHESIA PROCEDURE NOTES
Intubation    Date/Time: 12/11/2024 2:02 PM    Performed by: Tootie Black MD  Authorized by: Albert Walker MD    Intubation:     Induction:  Intravenous    Intubated:  Postinduction    Mask Ventilation:  Easy mask    Attempts:  1    Attempted By:  Resident anesthesiologist    Method of Intubation:  Fiberoptic    Blade:  [place] 3    Laryngeal View Grade: Grade I - full view of cords      Difficult Airway Encountered?: No      Complications:  None    Airway Device:  Double lumen tube left    Airway Device Size:  35F    Style/Cuff Inflation:  Cuffed (inflated to minimal occlusive pressure)    Secured at:  The lips    Placement Verified By:  Capnometry    Complicating Factors:  None    Findings Post-Intubation:  BS equal bilateral and atraumatic/condition of teeth unchanged

## 2024-12-11 NOTE — PROGRESS NOTES
[facility] Medicine Telemetry  Thoracic Surgery  Progress Note    Subjective:     History of Present Illness:  Ann-Marie Saavedra is a 44 y.o. 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 She underwent placement of pigtail chest tube in the ED. Thoracic surgery was consulted for persistent air leak.    Today she reports improvement in dyspnea and chest pain since placement of the chest tube. She is on 2L by NC. She is on Sirolimus, which was started on 12/3.     Post-Op Info:  Procedure(s) (LRB):  VATS, WITH PLEURODESIS (Right)         Interval History: Ms. Saavedra denies any interval worsening of symptoms. Air leak persists. Is NPO in anticipation of OR today.      Medications:  Continuous Infusions:  Scheduled Meds:   azelastine  2 spray Nasal BID    doxycycline  50 mg Oral Q12H    enoxparin  40 mg Subcutaneous Daily    senna  8.6 mg Oral Daily    sirolimus  1 mg Oral Daily     PRN Meds:  Current Facility-Administered Medications:     albuterol, 2 puff, Inhalation, Q6H PRN    dextrose 10%, 12.5 g, Intravenous, PRN    dextrose 10%, 25 g, Intravenous, PRN    glucagon (human recombinant), 1 mg, Intramuscular, PRN    glucose, 16 g, Oral, PRN    glucose, 24 g, Oral, PRN    naloxone, 0.02 mg, Intravenous, PRN    oxyCODONE, 5 mg, Oral, Q6H PRN    sodium chloride 0.9%, 10 mL, Intravenous, Q12H PRN     Review of patient's allergies indicates:  No Known Allergies  Objective:     Vital Signs (Most Recent):  Temp: 98.4 °F (36.9 °C) (12/11/24 0746)  Pulse: 75 (12/11/24 0746)  Resp: 18 (12/11/24 0746)  BP: 104/67 (12/11/24 0746)  SpO2: 96 % (12/11/24 0746) Vital Signs (24h Range):  Temp:  [97.8 °F (36.6 °C)-99.6 °F (37.6 °C)] 98.4 °F (36.9 °C)  Pulse:  [61-85] 75  Resp:  [18] 18  SpO2:  [94 %-97 %] 96 %  BP: ()/(65-79) 104/67     Intake/Output -  Last 3 Shifts         12/09 0700  12/10 0659 12/10 0700  12/11 0659 12/11 0700 12/12 0659    P.O. 340 720     Total Intake(mL/kg) 340 (6) 720 (12.6)     Urine (mL/kg/hr) 3050 (2.2) 0 (0)     Stool 0 0     Chest Tube 0 0     Total Output 3050 0     Net -2710 +720            Urine Occurrence 1 x 3 x     Stool Occurrence 0 x 0 x             SpO2: 96 %        Physical Exam  Vitals reviewed.   Constitutional:       General: She is not in acute distress.     Appearance: She is not toxic-appearing.   HENT:      Head: Normocephalic and atraumatic.   Eyes:      Extraocular Movements: Extraocular movements intact.   Cardiovascular:      Rate and Rhythm: Normal rate.   Pulmonary:      Effort: Pulmonary effort is normal. No respiratory distress.      Comments:   CT to suction with near continuous air leak  Skin:     General: Skin is warm and dry.   Neurological:      General: No focal deficit present.      Mental Status: She is alert.            Significant Labs:  All pertinent labs from the last 24 hours have been reviewed.    Significant Diagnostics:  I have reviewed all pertinent imaging results/findings within the past 24 hours.    VTE Risk Mitigation (From admission, onward)           Ordered     enoxaparin injection 40 mg  Daily         12/07/24 1542     IP VTE LOW RISK PATIENT  Once         12/07/24 1542     Place sequential compression device  Until discontinued         12/07/24 1542                  Assessment/Plan:     Lymphangioleiomyomatosis  Ann-Marie Saavedra is a 44 y.o. female with recently diagnosed lymphangioleiomyomatosis c/b pneumothorax who was admitted following chest tube placement by ED staff on 12/7/24. Thoracic surgery was consulted for evaluation of ongoing air leak.    -To OR today for VATS/Pleruodesis  -Blood and surgical consent signed at bedside  -Keep chest tube to suction  -NPO for procedure  -Daily chest x-ray    Thoracic surgery to continue to follow          Chad Diggs MD  Thoracic  Surgery  [facility] Medicine Tele[facility]

## 2024-12-11 NOTE — PROGRESS NOTES
Hospital Medicine  Progress note    Team: [facility] HOSP MED A Kendy Fallon MD  Admit Date: 12/7/2024  Code status: Full Code    Principal Problem:  Secondary spontaneous pneumothorax    Interval hx:  No new complaints. Pain controlled.  PEx  Temp:  [97.8 °F (36.6 °C)-99.6 °F (37.6 °C)]   Pulse:  [61-85]   Resp:  [18]   BP: ()/(65-81)   SpO2:  [94 %-97 %]     Intake/Output Summary (Last 24 hours) at 12/11/2024 1120  Last data filed at 12/11/2024 0524  Gross per 24 hour   Intake 480 ml   Output 0 ml   Net 480 ml       General Appearance: no acute distress, WDWN  Heart: regular rate and rhythm, no heave  Respiratory: Normal respiratory effort, symmetric excursion, bilateral vesicular breath sounds ; decreased breath sounds on right upper lung  Abdomen: Soft, non-tender; bowel sounds active  Skin: intact, no rash, no ulcers  Neurologic:  No focal numbness or weakness  Mental status: Alert, oriented x 4, affect appropriate    Recent Labs   Lab 12/08/24 0436 12/09/24 0238 12/10/24  0237   WBC 6.09 5.18 5.12   HGB 12.9 12.7 12.7   HCT 38.9 37.5 38.9    190 208     Recent Labs   Lab 12/08/24  0436 12/09/24 0238 12/10/24  0236   * 138 139   K 4.7 3.9 3.8    106 108   CO2 20* 24 22*   BUN 11 8 11   CREATININE 0.8 0.7 0.8   GLU 86 95 91   CALCIUM 8.6* 8.3* 8.5*   MG 1.8 1.8 1.9   PHOS 4.1 3.8 3.5     Recent Labs   Lab 12/08/24  0436 12/09/24 0238 12/10/24  0236   ALKPHOS 43 41 44   ALT 15 13 13   AST 20 17 17   ALBUMIN 3.7 3.5 3.6   PROT 6.0 5.7* 6.1   BILITOT 0.6 0.3 0.5      Scheduled Meds:   azelastine  2 spray Nasal BID    doxycycline  50 mg Oral Q12H    enoxparin  40 mg Subcutaneous Daily    senna  8.6 mg Oral Daily    sirolimus  1 mg Oral Daily     Continuous Infusions:  As Needed:    Current Facility-Administered Medications:     albuterol, 2 puff, Inhalation, Q6H PRN    dextrose 10%, 12.5 g, Intravenous, PRN    dextrose 10%, 25 g, Intravenous, PRN    glucagon (human recombinant), 1 mg,  Intramuscular, PRN    glucose, 16 g, Oral, PRN    glucose, 24 g, Oral, PRN    naloxone, 0.02 mg, Intravenous, PRN    oxyCODONE, 5 mg, Oral, Q6H PRN    sodium chloride 0.9%, 10 mL, Intravenous, Q12H PRN    Assessment and Plan  / Problems managed today    * Secondary spontaneous pneumothorax  lymphangioleiomyomatosis  CXR/Ct chest on admission with PTX   S/p chest tube placement in ED  Pulmonology consulted and managing CT  Supplemental O2 via NC prn  Pain control: Oxycodone 5 q 6 PRN and dilaudid 1 q 4 PRN (start prophylactic stool softer)  PTX improved but not resolved and with persistent air leak  CT surgery consulted for assistance in management  Lung transplant consulted  Patient to undergo VATS pleurodesis today.    Hyperkalemia  Hyperkalemia is likely due to  Unclear cause .The patients most recent potassium results are listed below.  Recent Labs     12/07/24  1151 12/07/24  1701 12/08/24  0436   K 5.6* 4.1 4.7       Plan  - Monitor for arrhythmias with EKG and/or continuous telemetry.   - Repeat K - if still elevated will treat .   - Monitor potassium: Every 12 hours    Lymphangioleiomyomatosis  Recent diagnosis   Being referred as outpatient to advanced lung disease and lung transplant  C/w sirolimus 1 mg daily  Pulm following    Rosacea  C/w home doxycycline daily        Diet:  regular  GI PPx: not needed  DVT PPx:  enoxaparin  Airways: room air  Drain: chest tube    Goals of Care:  Return to prior functional status     Discharge Planning   DAVID: 12/16/2024   Is the patient medically ready for discharge?:     Reason for patient still in hospital (select all that apply): Patient trending condition, Treatment, and Consult recommendations  Discharge Plan A: Home with family        Kendy Fallon MD

## 2024-12-11 NOTE — PLAN OF CARE
Problem: Adult Inpatient Plan of Care  Goal: Optimal Comfort and Wellbeing  Outcome: Progressing    Pt sitting HOB elevated at 45 degrees AAOX4 in no apparent distress on telephone. Chest tube in place with no drainage or signs of infection. Family member at bedside. No c/o pain or SOB at the moment.   Bed locked, in lowest position, call light in reach.

## 2024-12-11 NOTE — ANESTHESIA POSTPROCEDURE EVALUATION
Anesthesia Post Evaluation    Patient: Ann-Marie Saavedra    Procedure(s) Performed: Procedure(s) (LRB):  VATS, WITH PLEURODESIS (Right)  BLOCK, NERVE, INTERCOSTAL, 2 OR MORE (Right)    Final Anesthesia Type: general      Patient location during evaluation: PACU  Patient participation: Yes- Able to Participate  Level of consciousness: awake and alert  Post-procedure vital signs: reviewed and stable  Pain management: adequate  Airway patency: patent    PONV status at discharge: No PONV  Anesthetic complications: no      Cardiovascular status: blood pressure returned to baseline  Respiratory status: unassisted  Hydration status: euvolemic  Follow-up not needed.              Vitals Value Taken Time   /57 12/11/24 1717   Temp 98 12/11/24 1725   Pulse 61 12/11/24 1725   Resp 14 12/11/24 1725   SpO2 96 % 12/11/24 1725   Vitals shown include unfiled device data.      Event Time   Out of Recovery 12/11/2024 16:45:00         Pain/Niki Score: Pain Rating Prior to Med Admin: 8 (12/11/2024  4:54 PM)  Niki Score: 9 (12/11/2024  4:45 PM)

## 2024-12-11 NOTE — SUBJECTIVE & OBJECTIVE
Interval History: Ms. Saavedra denies any interval worsening of symptoms. Air leak persists. Is NPO in anticipation of OR today.      Medications:  Continuous Infusions:  Scheduled Meds:   azelastine  2 spray Nasal BID    doxycycline  50 mg Oral Q12H    enoxparin  40 mg Subcutaneous Daily    senna  8.6 mg Oral Daily    sirolimus  1 mg Oral Daily     PRN Meds:  Current Facility-Administered Medications:     albuterol, 2 puff, Inhalation, Q6H PRN    dextrose 10%, 12.5 g, Intravenous, PRN    dextrose 10%, 25 g, Intravenous, PRN    glucagon (human recombinant), 1 mg, Intramuscular, PRN    glucose, 16 g, Oral, PRN    glucose, 24 g, Oral, PRN    naloxone, 0.02 mg, Intravenous, PRN    oxyCODONE, 5 mg, Oral, Q6H PRN    sodium chloride 0.9%, 10 mL, Intravenous, Q12H PRN     Review of patient's allergies indicates:  No Known Allergies  Objective:     Vital Signs (Most Recent):  Temp: 98.4 °F (36.9 °C) (12/11/24 0746)  Pulse: 75 (12/11/24 0746)  Resp: 18 (12/11/24 0746)  BP: 104/67 (12/11/24 0746)  SpO2: 96 % (12/11/24 0746) Vital Signs (24h Range):  Temp:  [97.8 °F (36.6 °C)-99.6 °F (37.6 °C)] 98.4 °F (36.9 °C)  Pulse:  [61-85] 75  Resp:  [18] 18  SpO2:  [94 %-97 %] 96 %  BP: ()/(65-79) 104/67     Intake/Output - Last 3 Shifts         12/09 0700  12/10 0659 12/10 0700  12/11 0659 12/11 0700  12/12 0659    P.O. 340 720     Total Intake(mL/kg) 340 (6) 720 (12.6)     Urine (mL/kg/hr) 3050 (2.2) 0 (0)     Stool 0 0     Chest Tube 0 0     Total Output 3050 0     Net -2710 +720            Urine Occurrence 1 x 3 x     Stool Occurrence 0 x 0 x             SpO2: 96 %        Physical Exam  Vitals reviewed.   Constitutional:       General: She is not in acute distress.     Appearance: She is not toxic-appearing.   HENT:      Head: Normocephalic and atraumatic.   Eyes:      Extraocular Movements: Extraocular movements intact.   Cardiovascular:      Rate and Rhythm: Normal rate.   Pulmonary:      Effort: Pulmonary effort is normal.  No respiratory distress.      Comments:   CT to suction with near continuous air leak  Skin:     General: Skin is warm and dry.   Neurological:      General: No focal deficit present.      Mental Status: She is alert.            Significant Labs:  All pertinent labs from the last 24 hours have been reviewed.    Significant Diagnostics:  I have reviewed all pertinent imaging results/findings within the past 24 hours.    VTE Risk Mitigation (From admission, onward)           Ordered     enoxaparin injection 40 mg  Daily         12/07/24 1542     IP VTE LOW RISK PATIENT  Once         12/07/24 1542     Place sequential compression device  Until discontinued         12/07/24 1542

## 2024-12-11 NOTE — ASSESSMENT & PLAN NOTE
Ann-Marie Saavedra is a 44 y.o. female with recently diagnosed lymphangioleiomyomatosis c/b pneumothorax who was admitted following chest tube placement by ED staff on 12/7/24. Thoracic surgery was consulted for evaluation of ongoing air leak.    -To OR today for VATS/Pleruodesis  -Blood and surgical consent signed at bedside  -Keep chest tube to suction  -NPO for procedure  -Daily chest x-ray    Thoracic surgery to continue to follow

## 2024-12-11 NOTE — NURSING TRANSFER
Nursing Transfer Note      12/11/2024   5:48 PM    Nurse giving handoff:Tiffanie LI PACU RN  Nurse receiving handoff:Mariela    Reason patient is being transferred: post procedure    Transfer To: 1157    Transfer via bed    Transfer with 2 L NC to O2, chest tube    Transported by ALFRED Moreno &     Order for Tele Monitor? No    Additional Lines: Oxygen and Chest Tube    Medicines sent: N/A    Any special needs or follow-up needed: no    Patient belongings transferred with patient: No    Chart send with patient: Yes    Notified: spouse    Patient reassessed at: 1748 (date, time)    Upon arrival to floor: cardiac monitor applied, patient oriented to room, call bell in reach, and bed in lowest position

## 2024-12-12 PROBLEM — G89.18 POST-OP PAIN: Status: ACTIVE | Noted: 2024-12-12

## 2024-12-12 PROCEDURE — 25000003 PHARM REV CODE 250: Mod: NTX | Performed by: STUDENT IN AN ORGANIZED HEALTH CARE EDUCATION/TRAINING PROGRAM

## 2024-12-12 PROCEDURE — 63600175 PHARM REV CODE 636 W HCPCS: Mod: NTX | Performed by: INTERNAL MEDICINE

## 2024-12-12 PROCEDURE — 27201037 HC PRESSURE MONITORING SET UP: Mod: NTX

## 2024-12-12 PROCEDURE — 99233 SBSQ HOSP IP/OBS HIGH 50: CPT | Mod: NTX,,, | Performed by: NURSE PRACTITIONER

## 2024-12-12 PROCEDURE — 11000001 HC ACUTE MED/SURG PRIVATE ROOM: Mod: NTX

## 2024-12-12 PROCEDURE — 63600175 PHARM REV CODE 636 W HCPCS: Mod: NTX | Performed by: STUDENT IN AN ORGANIZED HEALTH CARE EDUCATION/TRAINING PROGRAM

## 2024-12-12 PROCEDURE — 25000003 PHARM REV CODE 250: Mod: NTX | Performed by: INTERNAL MEDICINE

## 2024-12-12 RX ORDER — LIDOCAINE 50 MG/G
1 PATCH TOPICAL
Status: DISCONTINUED | OUTPATIENT
Start: 2024-12-12 | End: 2024-12-18 | Stop reason: HOSPADM

## 2024-12-12 RX ORDER — HYDROMORPHONE HYDROCHLORIDE 1 MG/ML
0.5 INJECTION, SOLUTION INTRAMUSCULAR; INTRAVENOUS; SUBCUTANEOUS
Status: DISCONTINUED | OUTPATIENT
Start: 2024-12-12 | End: 2024-12-18 | Stop reason: HOSPADM

## 2024-12-12 RX ORDER — GABAPENTIN 300 MG/1
300 CAPSULE ORAL NIGHTLY
Status: DISCONTINUED | OUTPATIENT
Start: 2024-12-12 | End: 2024-12-18 | Stop reason: HOSPADM

## 2024-12-12 RX ADMIN — DOXYCYCLINE 50 MG: 50 CAPSULE ORAL at 08:12

## 2024-12-12 RX ADMIN — OXYCODONE HYDROCHLORIDE 15 MG: 10 TABLET ORAL at 04:12

## 2024-12-12 RX ADMIN — OXYCODONE HYDROCHLORIDE 5 MG: 5 TABLET ORAL at 05:12

## 2024-12-12 RX ADMIN — ACETAMINOPHEN 1000 MG: 500 TABLET ORAL at 02:12

## 2024-12-12 RX ADMIN — ACETAMINOPHEN 1000 MG: 500 TABLET ORAL at 09:12

## 2024-12-12 RX ADMIN — OXYCODONE HYDROCHLORIDE 15 MG: 10 TABLET ORAL at 12:12

## 2024-12-12 RX ADMIN — LIDOCAINE 1 PATCH: 50 PATCH CUTANEOUS at 02:12

## 2024-12-12 RX ADMIN — HYDROMORPHONE HYDROCHLORIDE 0.5 MG: 1 INJECTION, SOLUTION INTRAMUSCULAR; INTRAVENOUS; SUBCUTANEOUS at 08:12

## 2024-12-12 RX ADMIN — GABAPENTIN 300 MG: 300 CAPSULE ORAL at 08:12

## 2024-12-12 RX ADMIN — OXYCODONE HYDROCHLORIDE 15 MG: 10 TABLET ORAL at 09:12

## 2024-12-12 RX ADMIN — HYDROMORPHONE HYDROCHLORIDE 0.5 MG: 1 INJECTION, SOLUTION INTRAMUSCULAR; INTRAVENOUS; SUBCUTANEOUS at 03:12

## 2024-12-12 RX ADMIN — DOXYCYCLINE 50 MG: 50 CAPSULE ORAL at 09:12

## 2024-12-12 RX ADMIN — SENNOSIDES 8.6 MG: 8.6 TABLET, FILM COATED ORAL at 09:12

## 2024-12-12 RX ADMIN — ACETAMINOPHEN 1000 MG: 500 TABLET ORAL at 05:12

## 2024-12-12 RX ADMIN — HYDROMORPHONE HYDROCHLORIDE 0.5 MG: 1 INJECTION, SOLUTION INTRAMUSCULAR; INTRAVENOUS; SUBCUTANEOUS at 09:12

## 2024-12-12 RX ADMIN — ENOXAPARIN SODIUM 40 MG: 40 INJECTION SUBCUTANEOUS at 04:12

## 2024-12-12 NOTE — PROGRESS NOTES
[facility] - Internal Medicine Telemetry  Lung Transplant  Progress Note - Floor    Patient Name: Ann-Marie Saavedra  MRN: [id number]  Admission Date: 12/7/2024  Hospital Length of Stay: 4 days  Post-Operative Day:   Attending Physician: Kendy Fallon MD  Primary Care Provider: Elsi Klein MD     Subjective:     Interval History: Underwent VATS with doxy pleurodesis on 12/11. CXR markedly improved    Continuous Infusions:  Scheduled Meds:   acetaminophen  1,000 mg Oral Q8H    doxycycline  50 mg Oral Q12H    enoxparin  40 mg Subcutaneous Daily    gabapentin  200 mg Oral QHS    senna  8.6 mg Oral Daily     PRN Meds:  Current Facility-Administered Medications:     albuterol, 2 puff, Inhalation, Q6H PRN    dextrose 10%, 12.5 g, Intravenous, PRN    dextrose 10%, 25 g, Intravenous, PRN    glucagon (human recombinant), 1 mg, Intramuscular, PRN    glucose, 16 g, Oral, PRN    glucose, 24 g, Oral, PRN    HYDROmorphone, 0.5 mg, Intravenous, Q3H PRN    naloxone, 0.02 mg, Intravenous, PRN    oxyCODONE, 15 mg, Oral, Q4H PRN    sodium chloride 0.9%, 10 mL, Intravenous, Q12H PRN    Review of patient's allergies indicates:  No Known Allergies    Review of Systems   Constitutional:  Positive for activity change and fatigue. Negative for chills.   Cardiovascular:  Negative for palpitations and leg swelling.   Gastrointestinal:  Negative for abdominal distention and abdominal pain.   Musculoskeletal:  Negative for arthralgias and back pain.   Neurological:  Negative for syncope.   Psychiatric/Behavioral:  Negative for agitation and behavioral problems.      Objective:        Physical Exam  Vitals reviewed.   Constitutional:       General: She is not in acute distress.     Appearance: She is not toxic-appearing.   HENT:      Head: Normocephalic and atraumatic.   Eyes:      Extraocular Movements: Extraocular movements intact.      Conjunctiva/sclera: Conjunctivae normal.   Cardiovascular:      Rate and Rhythm: Normal rate.  "  Pulmonary:      Effort: Pulmonary effort is normal. No respiratory distress.      Comments: CT to suction with small air leak  Skin:     General: Skin is warm and dry.   Neurological:      General: No focal deficit present.      Mental Status: She is alert and oriented to person, place, and time.   Psychiatric:         Thought Content: Thought content normal.                Vital Signs (Most Recent):  Temp: 98.7 °F (37.1 °C) (12/12/24 0757)  Pulse: 78 (12/12/24 0757)  Resp: 18 (12/12/24 0914)  BP: 107/67 (12/12/24 0757)  SpO2: (!) 91 % (12/12/24 0757) Vital Signs (24h Range):  Temp:  [97.3 °F (36.3 °C)-98.7 °F (37.1 °C)] 98.7 °F (37.1 °C)  Pulse:  [64-81] 78  Resp:  [12-21] 18  SpO2:  [88 %-99 %] 91 %  BP: ()/(53-76) 107/67     Weight: 57 kg (125 lb 10.6 oz)  Body mass index is 20.28 kg/m².      Intake/Output Summary (Last 24 hours) at 12/12/2024 1108  Last data filed at 12/12/2024 0603  Gross per 24 hour   Intake 2240 ml   Output 1180 ml   Net 1060 ml       Significant Labs:  CBC:  Recent Labs   Lab 12/10/24  0237   WBC 5.12   RBC 4.23   HGB 12.7   HCT 38.9      MCV 92   MCH 30.0   MCHC 32.6     BMP:  Recent Labs   Lab 12/10/24  0236      K 3.8      CO2 22*   BUN 11   CREATININE 0.8   CALCIUM 8.5*      Tacrolimus Levels:  No results for input(s): "TACROLIMUS" in the last 72 hours.  Microbiology:  Microbiology Results (last 7 days)       Procedure Component Value Units Date/Time    Culture, Respiratory with Gram Stain [[id number]]     Order Status: No result Specimen: Sputum, Expectorated             I have reviewed all pertinent labs within the past 24 hours.    Diagnostic Results:CXR--small right pneumothorax persists, but it has decreased significantly in volume since 12/11/2024       Assessment/Plan:     * Secondary spontaneous pneumothorax  Underwent VATS with doxycycline pleurodesis on 12/11. CXR much improved.  Chest tube to remain to suction for 48 hours.  Managed by thoracic " surgery.        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   Will have patient follow up in LungTransplant clinic once discharged.        Darius Gamboa NP  Lung Transplant  [facility] - Internal Medicine Telemetry

## 2024-12-12 NOTE — SUBJECTIVE & OBJECTIVE
Subjective:     Interval History: Underwent VATS with doxy pleurodesis on 12/11. CXR markedly improved    Continuous Infusions:  Scheduled Meds:   acetaminophen  1,000 mg Oral Q8H    doxycycline  50 mg Oral Q12H    enoxparin  40 mg Subcutaneous Daily    gabapentin  200 mg Oral QHS    senna  8.6 mg Oral Daily     PRN Meds:  Current Facility-Administered Medications:     albuterol, 2 puff, Inhalation, Q6H PRN    dextrose 10%, 12.5 g, Intravenous, PRN    dextrose 10%, 25 g, Intravenous, PRN    glucagon (human recombinant), 1 mg, Intramuscular, PRN    glucose, 16 g, Oral, PRN    glucose, 24 g, Oral, PRN    HYDROmorphone, 0.5 mg, Intravenous, Q3H PRN    naloxone, 0.02 mg, Intravenous, PRN    oxyCODONE, 15 mg, Oral, Q4H PRN    sodium chloride 0.9%, 10 mL, Intravenous, Q12H PRN    Review of patient's allergies indicates:  No Known Allergies    Review of Systems   Constitutional:  Positive for activity change and fatigue. Negative for chills.   Cardiovascular:  Negative for palpitations and leg swelling.   Gastrointestinal:  Negative for abdominal distention and abdominal pain.   Musculoskeletal:  Negative for arthralgias and back pain.   Neurological:  Negative for syncope.   Psychiatric/Behavioral:  Negative for agitation and behavioral problems.      Objective:        Physical Exam  Vitals reviewed.   Constitutional:       General: She is not in acute distress.     Appearance: She is not toxic-appearing.   HENT:      Head: Normocephalic and atraumatic.   Eyes:      Extraocular Movements: Extraocular movements intact.      Conjunctiva/sclera: Conjunctivae normal.   Cardiovascular:      Rate and Rhythm: Normal rate.   Pulmonary:      Effort: Pulmonary effort is normal. No respiratory distress.      Comments: CT to suction with small air leak  Skin:     General: Skin is warm and dry.   Neurological:      General: No focal deficit present.      Mental Status: She is alert and oriented to person, place, and time.  "  Psychiatric:         Thought Content: Thought content normal.                Vital Signs (Most Recent):  Temp: 98.7 °F (37.1 °C) (12/12/24 0757)  Pulse: 78 (12/12/24 0757)  Resp: 18 (12/12/24 0914)  BP: 107/67 (12/12/24 0757)  SpO2: (!) 91 % (12/12/24 0757) Vital Signs (24h Range):  Temp:  [97.3 °F (36.3 °C)-98.7 °F (37.1 °C)] 98.7 °F (37.1 °C)  Pulse:  [64-81] 78  Resp:  [12-21] 18  SpO2:  [88 %-99 %] 91 %  BP: ()/(53-76) 107/67     Weight: 57 kg (125 lb 10.6 oz)  Body mass index is 20.28 kg/m².      Intake/Output Summary (Last 24 hours) at 12/12/2024 1108  Last data filed at 12/12/2024 0603  Gross per 24 hour   Intake 2240 ml   Output 1180 ml   Net 1060 ml       Significant Labs:  CBC:  Recent Labs   Lab 12/10/24  0237   WBC 5.12   RBC 4.23   HGB 12.7   HCT 38.9      MCV 92   MCH 30.0   MCHC 32.6     BMP:  Recent Labs   Lab 12/10/24  0236      K 3.8      CO2 22*   BUN 11   CREATININE 0.8   CALCIUM 8.5*      Tacrolimus Levels:  No results for input(s): "TACROLIMUS" in the last 72 hours.  Microbiology:  Microbiology Results (last 7 days)       Procedure Component Value Units Date/Time    Culture, Respiratory with Gram Stain [[id number]]     Order Status: No result Specimen: Sputum, Expectorated             I have reviewed all pertinent labs within the past 24 hours.    Diagnostic Results:CXR--small right pneumothorax persists, but it has decreased significantly in volume since 12/11/2024       "

## 2024-12-12 NOTE — SUBJECTIVE & OBJECTIVE
Interval History: c/o pain at insertion site and in shoulder. Minimal supplemental oxygen. Chest tube on suction -30.      Medications:  Continuous Infusions:  Scheduled Meds:   acetaminophen  1,000 mg Oral Q8H    doxycycline  50 mg Oral Q12H    enoxparin  40 mg Subcutaneous Daily    gabapentin  200 mg Oral QHS    senna  8.6 mg Oral Daily     PRN Meds:  Current Facility-Administered Medications:     albuterol, 2 puff, Inhalation, Q6H PRN    dextrose 10%, 12.5 g, Intravenous, PRN    dextrose 10%, 25 g, Intravenous, PRN    glucagon (human recombinant), 1 mg, Intramuscular, PRN    glucose, 16 g, Oral, PRN    glucose, 24 g, Oral, PRN    HYDROmorphone, 0.5 mg, Intravenous, Q3H PRN    naloxone, 0.02 mg, Intravenous, PRN    oxyCODONE, 15 mg, Oral, Q4H PRN    sodium chloride 0.9%, 10 mL, Intravenous, Q12H PRN     Review of patient's allergies indicates:  No Known Allergies  Objective:     Vital Signs (Most Recent):  Temp: 98.7 °F (37.1 °C) (12/12/24 0757)  Pulse: 78 (12/12/24 0757)  Resp: 18 (12/12/24 0914)  BP: 107/67 (12/12/24 0757)  SpO2: (!) 91 % (12/12/24 0757) Vital Signs (24h Range):  Temp:  [97.3 °F (36.3 °C)-98.7 °F (37.1 °C)] 98.7 °F (37.1 °C)  Pulse:  [64-83] 78  Resp:  [12-21] 18  SpO2:  [88 %-99 %] 91 %  BP: ()/(53-81) 107/67     Intake/Output - Last 3 Shifts         12/10 0700  12/11 0659 12/11 0700  12/12 0659 12/12 0700  12/13 0659    P.O. 720 140     IV Piggyback  2100     Total Intake(mL/kg) 720 (12.6) 2240 (39.3)     Urine (mL/kg/hr) 0 (0) 700 (0.5)     Stool 0      Chest Tube 0 480     Total Output 0 1180     Net +720 +1060            Urine Occurrence 3 x      Stool Occurrence 0 x              SpO2: (!) 91 %        Physical Exam  Vitals reviewed.   Constitutional:       General: She is not in acute distress.     Appearance: She is not toxic-appearing.   HENT:      Head: Normocephalic and atraumatic.   Eyes:      Extraocular Movements: Extraocular movements intact.      Conjunctiva/sclera:  "Conjunctivae normal.   Cardiovascular:      Rate and Rhythm: Normal rate.   Pulmonary:      Effort: Pulmonary effort is normal. No respiratory distress.      Comments: CT to suction with air leak  Skin:     General: Skin is warm and dry.   Neurological:      General: No focal deficit present.      Mental Status: She is alert and oriented to person, place, and time.   Psychiatric:         Thought Content: Thought content normal.          Significant Labs:  BMP: No results for input(s): "GLU", "NA", "K", "CL", "CO2", "BUN", "CREATININE", "CALCIUM", "MG" in the last 48 hours.    Significant Diagnostics:  CXR: I have reviewed all pertinent results/findings within the past 24 hours    VTE Risk Mitigation (From admission, onward)           Ordered     enoxaparin injection 40 mg  Daily         12/07/24 1542     IP VTE LOW RISK PATIENT  Once         12/07/24 1542     Place sequential compression device  Until discontinued         12/07/24 1542                  "

## 2024-12-12 NOTE — OP NOTE
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 No significant intrathoracic adhesions.     Estimated Blood Loss: 5 cc    Complications: None    Specimens: None    Implants/Drains: 24 Maltese chest tube, right chest    Indication(s): Ann-Marie Saavedra is a 44 y.o. female with a pneumothorax secondary to recently diagnosed lymphangioleiomyomatosis. We recommended VATS/pleurodesis and after discussion of treatment options and risks and benefits of the current procedure, the patient elected to proceed with surger. Informed consent was obtained.    Procedure in Detail: The patient was identified and taken to the operating room and placed in supine position. Time-outs were performed using safety checklists to verify correct patient, procedure, site, pre-operative antibiotic administration and additional critical information prior to beginning the procedure. Anesthesia was induced and double-lumen endotracheal intubation was performed uneventfully. The patient was positioned on the operating table in left lateral decubitus position with the bed flexed.  SCDs and warming blankets were applied. The right chest was then prepped and draped in usual sterile fashion.     A 12 mm incision was made over the 7th intercostal space in the mid-axillary line. The chest was entered bluntly with a Rienhoff clamp. A thoracoport was placed through the incision and the 10 mm 30° thoracoscope was introduced. The pleural cavity was inspected. It showed severely diseased pulmonary parenchyma with innumerable cystic lesions. Additional thoracoscopic incisions were made in the anterior 7th intercostal space. There were no significant adhesions.     Next, an intercostal nerve blocks was performed using 0.25% Marcaine, injected interspaces 3-10 from the outside with a 25 gauge needle. Next a full mechanical pleurodesis was performed by rubbing a bovie scratchpad against the parietal pleura. After hemostasis was ensured, the chest cavity was sprayed with 500mg of Doxycyclin in a 100mL solution. A single 24 Kenyan chest tubes was inserted into the chest through the anterior port and directed apically. The lung was ventilated and inflated slowly under direct vision.  The chest tube was secured with a #0-Ethibond suture and connected to pleurovac at -10mmHg and elevated for 2 hours. The thoracoscopic incisions were closed in 3 layers. The muscle layers were reapproximated with a interrupted  #0 Vicryl suture. The adipose tissue layers were reapproximated a interrupted  2-0 Vicryl suture. The skin was reapproximated with 4-0 Monocryl. The sponge and instrument counts were correct x2.      I was present for the entire procedure and participated in the entire procedure. There no intraoperative surgical complications. The patient was extubated in the operating room and transported to the recovery room in stable condition.     Dr. Alvarado was present for the case.     Disposition: PACU, then hospital angeol

## 2024-12-12 NOTE — PLAN OF CARE
Problem: Respiratory Compromise (Pneumothorax)  Goal: Optimal Oxygenation and Ventilation  Outcome: Progressing     Problem: Wound  Goal: Absence of Infection Signs and Symptoms  Outcome: Progressing   Pt on continuous wall suction at -30mmHG. Pt c/o pain located on the R.side. MD notified and pain meds readjusted. Incentive spirometer. Pain meds given accordingly. Bed locked and in lowest position. Call light in reach. Purewick in place.

## 2024-12-12 NOTE — ASSESSMENT & PLAN NOTE
Pain control: Oxycodone 15 q 4 PRN and dilaudid 0.5 mg q 3 PRN  Acetaminophen 1 g TID  Gabapentin 300 mg qhs  Lidocaine patch to area of pain  Ultimately can  de-ecalate to low dose opioid therapy with some lidocaine patches at discharge  NO NSAIDS (to allow for inflammatory reaction to pleurodesis)

## 2024-12-12 NOTE — NURSING
Pt looking to see why her Sirilimus is d/c? It was d/c yesterday but pt states she shouldn't miss this med. Please advise.

## 2024-12-12 NOTE — ASSESSMENT & PLAN NOTE
Ann-Marie Saavedra is a 44 y.o. female with recently diagnosed lymphangioleiomyomatosis c/b pneumothorax who was admitted following chest tube placement by ED staff on 12/7/24. Thoracic surgery was consulted for evaluation of ongoing air leak.    Chest tube to suction x 48 hours. Suction increased to -30 today.   Hold sirliomus for inflammatory reaction needed for pleurodesis and wound healing.   Typical post pleurodesis pain regimen to include scheduled robaxin 500mg QID, gabapentin 300 nightly or BID, tylenol 1g TID, and lidoderm patch. Prn oxycodone. Suspect shoulder pain is related to tube.   Avoid NSAIDS following pleurodesis.   Daily CXR.     Thoracic surgery to continue to follow

## 2024-12-12 NOTE — ASSESSMENT & PLAN NOTE
Recent diagnosis   Being referred as outpatient to advanced lung disease and lung transplant  sirolimus 1 mg daily - held for pneumothorax healing  Pulm following

## 2024-12-12 NOTE — ASSESSMENT & PLAN NOTE
Hold sirliomus for inflammatory reaction needed for pleurodesis and wound healing.   Will have patient follow up in LungTransplant clinic once discharged.

## 2024-12-12 NOTE — ASSESSMENT & PLAN NOTE
Underwent VATS with doxycycline pleurodesis on 12/11. CXR much improved.  Chest tube to remain to suction for 48 hours.  Managed by thoracic surgery.

## 2024-12-12 NOTE — PROGRESS NOTES
Hospital Medicine  Progress note    Team: [facility] [place] [facility] Kendy Fallon MD  Admit Date: 12/7/2024  Code status: Full Code    Principal Problem:  Secondary spontaneous pneumothorax    Interval hx:  No new complaints. Pain not well-controlled post-operatively  PEx  Temp:  [97.3 °F (36.3 °C)-98.7 °F (37.1 °C)]   Pulse:  [64-78]   Resp:  [12-21]   BP: ()/(53-76)   SpO2:  [88 %-99 %]     Intake/Output Summary (Last 24 hours) at 12/12/2024 1312  Last data filed at 12/12/2024 0603  Gross per 24 hour   Intake 2240 ml   Output 1180 ml   Net 1060 ml       General Appearance: no acute distress, WDWN  Heart: regular rate and rhythm, no heave  Respiratory: Normal respiratory effort, symmetric excursion, bilateral vesicular breath sounds ; decreased breath sounds on right upper lung  Abdomen: Soft, non-tender; bowel sounds active  Skin: intact, no rash, no ulcers  Neurologic:  No focal numbness or weakness  Mental status: Alert, oriented x 4, affect appropriate    Recent Labs   Lab 12/08/24 0436 12/09/24 0238 12/10/24  0237   WBC 6.09 5.18 5.12   HGB 12.9 12.7 12.7   HCT 38.9 37.5 38.9    190 208     Recent Labs   Lab 12/08/24  0436 12/09/24 0238 12/10/24  0236   * 138 139   K 4.7 3.9 3.8    106 108   CO2 20* 24 22*   BUN 11 8 11   CREATININE 0.8 0.7 0.8   GLU 86 95 91   CALCIUM 8.6* 8.3* 8.5*   MG 1.8 1.8 1.9   PHOS 4.1 3.8 3.5     Recent Labs   Lab 12/08/24  0436 12/09/24  0238 12/10/24  0236   ALKPHOS 43 41 44   ALT 15 13 13   AST 20 17 17   ALBUMIN 3.7 3.5 3.6   PROT 6.0 5.7* 6.1   BILITOT 0.6 0.3 0.5      Scheduled Meds:   acetaminophen  1,000 mg Oral Q8H    doxycycline  50 mg Oral Q12H    enoxparin  40 mg Subcutaneous Daily    gabapentin  300 mg Oral QHS    LIDOcaine  1 patch Transdermal Q24H    senna  8.6 mg Oral Daily     Continuous Infusions:  As Needed:    Current Facility-Administered Medications:     albuterol, 2 puff, Inhalation, Q6H PRN    dextrose 10%, 12.5 g, Intravenous, PRN    dextrose  10%, 25 g, Intravenous, PRN    glucagon (human recombinant), 1 mg, Intramuscular, PRN    glucose, 16 g, Oral, PRN    glucose, 24 g, Oral, PRN    HYDROmorphone, 0.5 mg, Intravenous, Q3H PRN    naloxone, 0.02 mg, Intravenous, PRN    oxyCODONE, 15 mg, Oral, Q4H PRN    sodium chloride 0.9%, 10 mL, Intravenous, Q12H PRN    Assessment and Plan  / Problems managed today    * Secondary spontaneous pneumothorax  lymphangioleiomyomatosis  CXR/Ct chest on admission with PTX   S/p chest tube placement in ED  Pulmonology consulted and managing CT  Supplemental O2 via NC prn  PTX improved but not resolved and with persistent air leak initially  CT surgery consulted for assistance in management  Lung transplant consulted  Patient to underwent doxycycline VATS 11/11 - improved pneumothorax as well as SOB   Hold sirolimus to allow for inflammatory response of pleurodesis    Post-op pain  Pain control: Oxycodone 15 q 4 PRN and dilaudid 0.5 mg q 3 PRN  Gabapentin 300 mg qhs  Lidocaine patch to area of pain  Will add robaxin if still hurting    Hyperkalemia  Hyperkalemia is likely due to  Unclear cause .The patients most recent potassium results are listed below.  Recent Labs     12/09/24  0238 12/10/24  0236   K 3.9 3.8     Plan  - Monitor for arrhythmias with EKG and/or continuous telemetry.   - Repeat K - if still elevated will treat .   - Monitor potassium: Every 12 hours    Lymphangioleiomyomatosis  Recent diagnosis   Being referred as outpatient to advanced lung disease and lung transplant  C/w sirolimus 1 mg daily - held for pneumothorax healing  Pulm following    Rosacea  C/w home doxycycline daily        Diet:  regular  GI PPx: not needed  DVT PPx:  enoxaparin  Airways: room air  Drain: chest tube    Goals of Care:  Return to prior functional status     Discharge Planning   DAVID: 12/16/2024   Is the patient medically ready for discharge?:     Reason for patient still in hospital (select all that apply): Patient trending  condition, Treatment, and Consult recommendations  Discharge Plan A: Home with family        Kendy Fallon MD

## 2024-12-12 NOTE — PROGRESS NOTES
[facility]  Thoracic Surgery  Progress Note    Subjective:     History of Present Illness:  Ann-Marie Saavedra is a 44 y.o. 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 She underwent placement of pigtail chest tube in the ED. Thoracic surgery was consulted for persistent air leak.    Today she reports improvement in dyspnea and chest pain since placement of the chest tube. She is on 2L by NC. She is on Sirolimus, which was started on 12/3.     Post-Op Info:  Procedure(s) (LRB):  VATS, WITH PLEURODESIS (Right)  BLOCK, NERVE, INTERCOSTAL, 2 OR MORE (Right)   1 Day Post-Op     Interval History: c/o pain at insertion site and in shoulder. Minimal supplemental oxygen. Chest tube on suction -30.      Medications:  Continuous Infusions:  Scheduled Meds:   acetaminophen  1,000 mg Oral Q8H    doxycycline  50 mg Oral Q12H    enoxparin  40 mg Subcutaneous Daily    gabapentin  200 mg Oral QHS    senna  8.6 mg Oral Daily     PRN Meds:  Current Facility-Administered Medications:     albuterol, 2 puff, Inhalation, Q6H PRN    dextrose 10%, 12.5 g, Intravenous, PRN    dextrose 10%, 25 g, Intravenous, PRN    glucagon (human recombinant), 1 mg, Intramuscular, PRN    glucose, 16 g, Oral, PRN    glucose, 24 g, Oral, PRN    HYDROmorphone, 0.5 mg, Intravenous, Q3H PRN    naloxone, 0.02 mg, Intravenous, PRN    oxyCODONE, 15 mg, Oral, Q4H PRN    sodium chloride 0.9%, 10 mL, Intravenous, Q12H PRN     Review of patient's allergies indicates:  No Known Allergies  Objective:     Vital Signs (Most Recent):  Temp: 98.7 °F (37.1 °C) (12/12/24 0757)  Pulse: 78 (12/12/24 0757)  Resp: 18 (12/12/24 0914)  BP: 107/67 (12/12/24 0757)  SpO2: (!) 91 % (12/12/24 0757) Vital Signs (24h Range):  Temp:  [97.3 °F (36.3 °C)-98.7 °F (37.1 °C)] 98.7 °F (37.1 °C)  Pulse:   "[64-83] 78  Resp:  [12-21] 18  SpO2:  [88 %-99 %] 91 %  BP: ()/(53-81) 107/67     Intake/Output - Last 3 Shifts         12/10 0700 12/11 0659 12/11 0700  12/12 0659 12/12 0700 12/13 0659    P.O. 720 140     IV Piggyback  2100     Total Intake(mL/kg) 720 (12.6) 2240 (39.3)     Urine (mL/kg/hr) 0 (0) 700 (0.5)     Stool 0      Chest Tube 0 480     Total Output 0 1180     Net +720 +1060            Urine Occurrence 3 x      Stool Occurrence 0 x              SpO2: (!) 91 %        Physical Exam  Vitals reviewed.   Constitutional:       General: She is not in acute distress.     Appearance: She is not toxic-appearing.   HENT:      Head: Normocephalic and atraumatic.   Eyes:      Extraocular Movements: Extraocular movements intact.      Conjunctiva/sclera: Conjunctivae normal.   Cardiovascular:      Rate and Rhythm: Normal rate.   Pulmonary:      Effort: Pulmonary effort is normal. No respiratory distress.      Comments: CT to suction with air leak  Skin:     General: Skin is warm and dry.   Neurological:      General: No focal deficit present.      Mental Status: She is alert and oriented to person, place, and time.   Psychiatric:         Thought Content: Thought content normal.          Significant Labs:  BMP: No results for input(s): "GLU", "NA", "K", "CL", "CO2", "BUN", "CREATININE", "CALCIUM", "MG" in the last 48 hours.    Significant Diagnostics:  CXR: I have reviewed all pertinent results/findings within the past 24 hours    VTE Risk Mitigation (From admission, onward)           Ordered     enoxaparin injection 40 mg  Daily         12/07/24 1542     IP VTE LOW RISK PATIENT  Once         12/07/24 1542     Place sequential compression device  Until discontinued         12/07/24 1542                  Assessment/Plan:     Lymphangioleiomyomatosis  Ann-Marie Saavedra is a 44 y.o. female with recently diagnosed lymphangioleiomyomatosis c/b pneumothorax who was admitted following chest tube placement by ED staff on " 12/7/24. Thoracic surgery was consulted for evaluation of ongoing air leak.    Chest tube to suction x 48 hours. Suction increased to -30 today.   Hold sirliomus for inflammatory reaction needed for pleurodesis and wound healing.   Typical post pleurodesis pain regimen to include scheduled robaxin 500mg QID, gabapentin 300 nightly or BID, tylenol 1g TID, and lidoderm patch. Prn oxycodone. Suspect shoulder pain is related to tube.   Avoid NSAIDS following pleurodesis.   Daily CXR.     Thoracic surgery to continue to follow          Joie Weinstein PA-C  Thoracic Surgery  [facility] Tele[facility]

## 2024-12-13 PROBLEM — K59.03 OPIOID-INDUCED CONSTIPATION: Status: ACTIVE | Noted: 2024-12-13

## 2024-12-13 PROBLEM — T40.2X5A OPIOID-INDUCED CONSTIPATION: Status: ACTIVE | Noted: 2024-12-13

## 2024-12-13 PROCEDURE — 25000003 PHARM REV CODE 250: Mod: NTX | Performed by: STUDENT IN AN ORGANIZED HEALTH CARE EDUCATION/TRAINING PROGRAM

## 2024-12-13 PROCEDURE — 11000001 HC ACUTE MED/SURG PRIVATE ROOM: Mod: NTX

## 2024-12-13 PROCEDURE — 25000003 PHARM REV CODE 250: Mod: NTX | Performed by: INTERNAL MEDICINE

## 2024-12-13 PROCEDURE — 63600175 PHARM REV CODE 636 W HCPCS: Mod: NTX | Performed by: INTERNAL MEDICINE

## 2024-12-13 PROCEDURE — 63600175 PHARM REV CODE 636 W HCPCS: Mod: NTX | Performed by: STUDENT IN AN ORGANIZED HEALTH CARE EDUCATION/TRAINING PROGRAM

## 2024-12-13 PROCEDURE — 99233 SBSQ HOSP IP/OBS HIGH 50: CPT | Mod: NTX,,, | Performed by: NURSE PRACTITIONER

## 2024-12-13 RX ORDER — AMOXICILLIN 250 MG
2 CAPSULE ORAL 2 TIMES DAILY
Status: DISCONTINUED | OUTPATIENT
Start: 2024-12-13 | End: 2024-12-18 | Stop reason: HOSPADM

## 2024-12-13 RX ORDER — POLYETHYLENE GLYCOL 3350 17 G/17G
17 POWDER, FOR SOLUTION ORAL 2 TIMES DAILY
Status: DISCONTINUED | OUTPATIENT
Start: 2024-12-13 | End: 2024-12-18 | Stop reason: HOSPADM

## 2024-12-13 RX ADMIN — OXYCODONE HYDROCHLORIDE 15 MG: 10 TABLET ORAL at 07:12

## 2024-12-13 RX ADMIN — POLYETHYLENE GLYCOL 3350 17 G: 17 POWDER, FOR SOLUTION ORAL at 09:12

## 2024-12-13 RX ADMIN — ACETAMINOPHEN 1000 MG: 500 TABLET ORAL at 09:12

## 2024-12-13 RX ADMIN — SENNOSIDES 8.6 MG: 8.6 TABLET, FILM COATED ORAL at 09:12

## 2024-12-13 RX ADMIN — ACETAMINOPHEN 1000 MG: 500 TABLET ORAL at 01:12

## 2024-12-13 RX ADMIN — DOXYCYCLINE 50 MG: 50 CAPSULE ORAL at 09:12

## 2024-12-13 RX ADMIN — OXYCODONE HYDROCHLORIDE 15 MG: 10 TABLET ORAL at 09:12

## 2024-12-13 RX ADMIN — LIDOCAINE 1 PATCH: 50 PATCH CUTANEOUS at 01:12

## 2024-12-13 RX ADMIN — OXYCODONE HYDROCHLORIDE 15 MG: 10 TABLET ORAL at 03:12

## 2024-12-13 RX ADMIN — SENNOSIDES AND DOCUSATE SODIUM 2 TABLET: 50; 8.6 TABLET ORAL at 09:12

## 2024-12-13 RX ADMIN — GABAPENTIN 300 MG: 300 CAPSULE ORAL at 09:12

## 2024-12-13 RX ADMIN — SENNOSIDES AND DOCUSATE SODIUM 2 TABLET: 50; 8.6 TABLET ORAL at 01:12

## 2024-12-13 RX ADMIN — POLYETHYLENE GLYCOL 3350 17 G: 17 POWDER, FOR SOLUTION ORAL at 01:12

## 2024-12-13 RX ADMIN — HYDROMORPHONE HYDROCHLORIDE 0.5 MG: 1 INJECTION, SOLUTION INTRAMUSCULAR; INTRAVENOUS; SUBCUTANEOUS at 07:12

## 2024-12-13 RX ADMIN — ENOXAPARIN SODIUM 40 MG: 40 INJECTION SUBCUTANEOUS at 06:12

## 2024-12-13 RX ADMIN — OXYCODONE HYDROCHLORIDE 15 MG: 10 TABLET ORAL at 02:12

## 2024-12-13 RX ADMIN — HYDROMORPHONE HYDROCHLORIDE 0.5 MG: 1 INJECTION, SOLUTION INTRAMUSCULAR; INTRAVENOUS; SUBCUTANEOUS at 01:12

## 2024-12-13 NOTE — SUBJECTIVE & OBJECTIVE
Interval History: Ms. Saavedra reports improved pain control over the last 24 hours. Chest tube off suction on my assessment this morning, unclear when this was disconnected. Placed back to -30 mmHg. Continued air leak. CXR stable.     Medications:  Continuous Infusions:  Scheduled Meds:   acetaminophen  1,000 mg Oral Q8H    doxycycline  50 mg Oral Q12H    enoxparin  40 mg Subcutaneous Daily    gabapentin  300 mg Oral QHS    LIDOcaine  1 patch Transdermal Q24H    senna  8.6 mg Oral Daily     PRN Meds:  Current Facility-Administered Medications:     albuterol, 2 puff, Inhalation, Q6H PRN    dextrose 10%, 12.5 g, Intravenous, PRN    dextrose 10%, 25 g, Intravenous, PRN    glucagon (human recombinant), 1 mg, Intramuscular, PRN    glucose, 16 g, Oral, PRN    glucose, 24 g, Oral, PRN    HYDROmorphone, 0.5 mg, Intravenous, Q3H PRN    naloxone, 0.02 mg, Intravenous, PRN    oxyCODONE, 15 mg, Oral, Q4H PRN    sodium chloride 0.9%, 10 mL, Intravenous, Q12H PRN     Review of patient's allergies indicates:  No Known Allergies  Objective:     Vital Signs (Most Recent):  Temp: 98.2 °F (36.8 °C) (12/13/24 0807)  Pulse: 75 (12/13/24 0807)  Resp: 17 (12/13/24 0807)  BP: 102/68 (12/13/24 0807)  SpO2: (!) 87 % (12/13/24 0807) Vital Signs (24h Range):  Temp:  [97.7 °F (36.5 °C)-99.2 °F (37.3 °C)] 98.2 °F (36.8 °C)  Pulse:  [65-80] 75  Resp:  [16-18] 17  SpO2:  [87 %-99 %] 87 %  BP: ()/(60-70) 102/68     Intake/Output - Last 3 Shifts         12/11 0700  12/12 0659 12/12 0700  12/13 0659 12/13 0700  12/14 0659    P.O. 140      IV Piggyback 2100      Total Intake(mL/kg) 2240 (39.3)      Urine (mL/kg/hr) 700 (0.5) 200 (0.1)     Stool       Chest Tube 480 284     Total Output 1180 484     Net +1060 -484                    SpO2: (!) 87 %        Physical Exam  Vitals reviewed.   Constitutional:       General: She is not in acute distress.     Appearance: She is not toxic-appearing.   HENT:      Head: Normocephalic and atraumatic.    Eyes:      Extraocular Movements: Extraocular movements intact.   Cardiovascular:      Rate and Rhythm: Normal rate.   Pulmonary:      Effort: Pulmonary effort is normal. No respiratory distress.      Comments:   CT to suction with air leak on forced expiration   Skin:     General: Skin is warm and dry.   Neurological:      General: No focal deficit present.      Mental Status: She is alert.            Significant Labs:  All pertinent labs from the last 24 hours have been reviewed.    Significant Diagnostics:  I have reviewed all pertinent imaging results/findings within the past 24 hours.    VTE Risk Mitigation (From admission, onward)           Ordered     enoxaparin injection 40 mg  Daily         12/07/24 1542     IP VTE LOW RISK PATIENT  Once         12/07/24 1542     Place sequential compression device  Until discontinued         12/07/24 1542

## 2024-12-13 NOTE — ASSESSMENT & PLAN NOTE
Underwent VATS with doxycycline pleurodesis on 12/11. CXR much improved.  Chest tube to remain to suction for 48 hours, possibly change to water seal this evening.  Managed by thoracic surgery.

## 2024-12-13 NOTE — NURSING
Charge nurse notified this writer that she was checking patient's temp for complaint of new onset headache. Patient also called to  to request medication for pain. States pain is headache, 4/10. Noted flushing to patient's face across cheek and nose. T98.8. HR74, RR 16, /64 MAP 76 SpO2 800-83% on room air. Applied oxygen 2L/min via nasal cannula. SpO2 improved to 90-91%. Provider notified.

## 2024-12-13 NOTE — PROGRESS NOTES
12/13/24 1458   Vital Signs   Resp 16   SpO2 (!) 88 %   Oximetry Probe Status Assessed   Device (Oxygen Therapy) room air

## 2024-12-13 NOTE — PROGRESS NOTES
Hospital Medicine  Progress note    Team: [facility] MED A Kendy Fallon MD  Admit Date: 12/7/2024  Code status: Full Code    Principal Problem:  Secondary spontaneous pneumothorax    Interval hx:  No new complaints. Pain better controlled. No BM for a week. Does not feel uncomfortable    PEx  Temp:  [97.7 °F (36.5 °C)-99.2 °F (37.3 °C)]   Pulse:  [65-80]   Resp:  [16-18]   BP: ()/(60-74)   SpO2:  [87 %-99 %]     Intake/Output Summary (Last 24 hours) at 12/13/2024 1234  Last data filed at 12/13/2024 0636  Gross per 24 hour   Intake --   Output 484 ml   Net -484 ml       General Appearance: no acute distress, WDWN  Heart: regular rate and rhythm, no heave  Respiratory: Normal respiratory effort, symmetric excursion, bilateral vesicular breath sounds ; decreased breath sounds on right upper lung  Abdomen: Soft, non-tender; bowel sounds active  Skin: intact, no rash, no ulcers  Neurologic:  No focal numbness or weakness  Mental status: Alert, oriented x 4, affect appropriate    Recent Labs   Lab 12/08/24 0436 12/09/24 0238 12/10/24  0237   WBC 6.09 5.18 5.12   HGB 12.9 12.7 12.7   HCT 38.9 37.5 38.9    190 208     Recent Labs   Lab 12/08/24  0436 12/09/24 0238 12/10/24  0236   * 138 139   K 4.7 3.9 3.8    106 108   CO2 20* 24 22*   BUN 11 8 11   CREATININE 0.8 0.7 0.8   GLU 86 95 91   CALCIUM 8.6* 8.3* 8.5*   MG 1.8 1.8 1.9   PHOS 4.1 3.8 3.5     Recent Labs   Lab 12/08/24  0436 12/09/24 0238 12/10/24  0236   ALKPHOS 43 41 44   ALT 15 13 13   AST 20 17 17   ALBUMIN 3.7 3.5 3.6   PROT 6.0 5.7* 6.1   BILITOT 0.6 0.3 0.5      Scheduled Meds:   acetaminophen  1,000 mg Oral Q8H    doxycycline  50 mg Oral Q12H    enoxparin  40 mg Subcutaneous Daily    gabapentin  300 mg Oral QHS    LIDOcaine  1 patch Transdermal Q24H    senna  8.6 mg Oral Daily     Continuous Infusions:  As Needed:    Current Facility-Administered Medications:     albuterol, 2 puff, Inhalation, Q6H PRN    dextrose 10%, 12.5 g,  Intravenous, PRN    dextrose 10%, 25 g, Intravenous, PRN    glucagon (human recombinant), 1 mg, Intramuscular, PRN    glucose, 16 g, Oral, PRN    glucose, 24 g, Oral, PRN    HYDROmorphone, 0.5 mg, Intravenous, Q3H PRN    naloxone, 0.02 mg, Intravenous, PRN    oxyCODONE, 15 mg, Oral, Q4H PRN    sodium chloride 0.9%, 10 mL, Intravenous, Q12H PRN    Assessment and Plan  / Problems managed today    * Secondary spontaneous pneumothorax  lymphangioleiomyomatosis  CXR/Ct chest on admission with PTX   S/p chest tube placement in ED  Pulmonology consulted and managing CT  Supplemental O2 via NC prn  PTX improved but not resolved and with persistent air leak initially  CT surgery consulted for assistance in management  Lung transplant consulted  Patient to underwent doxycycline VATS 11/11 - improved pneumothorax as well as SOB   Hold sirolimus to allow for inflammatory response of pleurodesis    Opioid-induced constipation  Senokot ii po BID and miralax 17 po BID for now    Post-op pain  Pain control: Oxycodone 15 q 4 PRN and dilaudid 0.5 mg q 3 PRN  Gabapentin 300 mg qhs  Lidocaine patch to area of pain  Will add robaxin if still hurting    Hyperkalemia  Hyperkalemia is likely due to  Unclear cause .The patients most recent potassium results are listed below.  Recent Labs     12/09/24  0238 12/10/24  0236   K 3.9 3.8     Plan  - Monitor for arrhythmias with EKG and/or continuous telemetry.   - Repeat K - if still elevated will treat .   - Monitor potassium: Every 12 hours    Lymphangioleiomyomatosis  Recent diagnosis   Being referred as outpatient to advanced lung disease and lung transplant  C/w sirolimus 1 mg daily - held for pneumothorax healing  Pulm following    Rosacea  C/w home doxycycline daily        Diet:  regular  GI PPx: not needed  DVT PPx:  enoxaparin  Airways: room air  Drain: chest tube    Goals of Care:  Return to prior functional status     Discharge Planning   DAVID: 12/16/2024   Is the patient medically  ready for discharge?:     Reason for patient still in hospital (select all that apply): Patient trending condition, Treatment, and Consult recommendations  Discharge Plan A: Home with family        Kendy Fallon MD

## 2024-12-13 NOTE — SUBJECTIVE & OBJECTIVE
Subjective:     Interval History: Underwent VATS with doxy pleurodesis on 12/11. Chest tubes currently to suction.    Continuous Infusions:  Scheduled Meds:   acetaminophen  1,000 mg Oral Q8H    doxycycline  50 mg Oral Q12H    enoxparin  40 mg Subcutaneous Daily    gabapentin  300 mg Oral QHS    LIDOcaine  1 patch Transdermal Q24H    senna  8.6 mg Oral Daily     PRN Meds:  Current Facility-Administered Medications:     albuterol, 2 puff, Inhalation, Q6H PRN    dextrose 10%, 12.5 g, Intravenous, PRN    dextrose 10%, 25 g, Intravenous, PRN    glucagon (human recombinant), 1 mg, Intramuscular, PRN    glucose, 16 g, Oral, PRN    glucose, 24 g, Oral, PRN    HYDROmorphone, 0.5 mg, Intravenous, Q3H PRN    naloxone, 0.02 mg, Intravenous, PRN    oxyCODONE, 15 mg, Oral, Q4H PRN    sodium chloride 0.9%, 10 mL, Intravenous, Q12H PRN    Review of patient's allergies indicates:  No Known Allergies    Review of Systems   Constitutional:  Positive for activity change and fatigue. Negative for chills.   Cardiovascular:  Negative for palpitations and leg swelling.   Gastrointestinal:  Negative for abdominal distention and abdominal pain.   Musculoskeletal:  Negative for arthralgias and back pain.   Neurological:  Negative for syncope.   Psychiatric/Behavioral:  Negative for agitation and behavioral problems.      Objective:        Physical Exam  Vitals reviewed.   Constitutional:       General: She is not in acute distress.     Appearance: She is not toxic-appearing.   HENT:      Head: Normocephalic and atraumatic.   Eyes:      Extraocular Movements: Extraocular movements intact.      Conjunctiva/sclera: Conjunctivae normal.   Cardiovascular:      Rate and Rhythm: Normal rate.   Pulmonary:      Effort: Pulmonary effort is normal. No respiratory distress.      Comments: CT to suction with small air leak  Skin:     General: Skin is warm and dry.   Neurological:      General: No focal deficit present.      Mental Status: She is alert  "and oriented to person, place, and time.   Psychiatric:         Thought Content: Thought content normal.                Vital Signs (Most Recent):  Temp: 98.2 °F (36.8 °C) (12/13/24 0807)  Pulse: 75 (12/13/24 0807)  Resp: 16 (12/13/24 0951)  BP: 102/68 (12/13/24 0807)  SpO2: (!) 87 % (12/13/24 0807) Vital Signs (24h Range):  Temp:  [97.7 °F (36.5 °C)-99.2 °F (37.3 °C)] 98.2 °F (36.8 °C)  Pulse:  [65-80] 75  Resp:  [16-18] 16  SpO2:  [87 %-99 %] 87 %  BP: ()/(60-68) 102/68     Weight: 57 kg (125 lb 10.6 oz)  Body mass index is 20.28 kg/m².      Intake/Output Summary (Last 24 hours) at 12/13/2024 1118  Last data filed at 12/13/2024 0636  Gross per 24 hour   Intake --   Output 484 ml   Net -484 ml       Significant Labs:  CBC:  No results for input(s): "WBC", "RBC", "HGB", "HCT", "PLT", "MCV", "MCH", "MCHC" in the last 72 hours.    BMP:  No results for input(s): "GLUCOSE", "NA", "K", "CL", "CO2", "BUN", "CREATININE", "CALCIUM" in the last 72 hours.     Tacrolimus Levels:  No results for input(s): "TACROLIMUS" in the last 72 hours.  Microbiology:  Microbiology Results (last 7 days)       Procedure Component Value Units Date/Time    Culture, Respiratory with Gram Stain [[id number]]     Order Status: No result Specimen: Sputum, Expectorated             I have reviewed all pertinent labs within the past 24 hours.    Diagnostic Results:CXR--Right-sided chest tube is unchanged in position. No definitive evidence for pneumothorax. When compared with the prior study, there is no significant change in the cardiopulmonary status of the patient. Subcutaneous air in the right lateral chest wall remains grossly unchanged.       "

## 2024-12-13 NOTE — ASSESSMENT & PLAN NOTE
Holding sirliomus for inflammatory reaction needed for pleurodesis and wound healing.   Will have patient follow up in LungTransplant clinic once discharged.

## 2024-12-13 NOTE — HOSPITAL COURSE
Patient had chest tube placed in ER. Pulmonary, transplant lung and thoracic surgery consulted. Still with air leak. So patient underwent VATS pleurodesis with doxycycline 12/11 and upsizing of chest tube. Holding sirolimus to allow inflammatory reaction to seal the PTX. Still with Chest tube. CTS would like to do an extended clamp overnight to see how she does. Goal DC in am.

## 2024-12-13 NOTE — PROGRESS NOTES
[facility] Medicine Telemetry  Lung Transplant  Progress Note - Floor    Patient Name: Ann-Marie Saavedra  MRN: [id number]  Admission Date: 12/7/2024  Hospital Length of Stay: 5 days  Post-Operative Day:   Attending Physician: Kendy Fallon MD  Primary Care Provider: Elsi Klein MD     Subjective:     Interval History: Underwent VATS with doxy pleurodesis on 12/11. Chest tubes currently to suction.    Continuous Infusions:  Scheduled Meds:   acetaminophen  1,000 mg Oral Q8H    doxycycline  50 mg Oral Q12H    enoxparin  40 mg Subcutaneous Daily    gabapentin  300 mg Oral QHS    LIDOcaine  1 patch Transdermal Q24H    senna  8.6 mg Oral Daily     PRN Meds:  Current Facility-Administered Medications:     albuterol, 2 puff, Inhalation, Q6H PRN    dextrose 10%, 12.5 g, Intravenous, PRN    dextrose 10%, 25 g, Intravenous, PRN    glucagon (human recombinant), 1 mg, Intramuscular, PRN    glucose, 16 g, Oral, PRN    glucose, 24 g, Oral, PRN    HYDROmorphone, 0.5 mg, Intravenous, Q3H PRN    naloxone, 0.02 mg, Intravenous, PRN    oxyCODONE, 15 mg, Oral, Q4H PRN    sodium chloride 0.9%, 10 mL, Intravenous, Q12H PRN    Review of patient's allergies indicates:  No Known Allergies    Review of Systems   Constitutional:  Positive for activity change and fatigue. Negative for chills.   Cardiovascular:  Negative for palpitations and leg swelling.   Gastrointestinal:  Negative for abdominal distention and abdominal pain.   Musculoskeletal:  Negative for arthralgias and back pain.   Neurological:  Negative for syncope.   Psychiatric/Behavioral:  Negative for agitation and behavioral problems.      Objective:        Physical Exam  Vitals reviewed.   Constitutional:       General: She is not in acute distress.     Appearance: She is not toxic-appearing.   HENT:      Head: Normocephalic and atraumatic.   Eyes:      Extraocular Movements: Extraocular movements intact.      Conjunctiva/sclera: Conjunctivae normal.  "  Cardiovascular:      Rate and Rhythm: Normal rate.   Pulmonary:      Effort: Pulmonary effort is normal. No respiratory distress.      Comments: CT to suction with small air leak  Skin:     General: Skin is warm and dry.   Neurological:      General: No focal deficit present.      Mental Status: She is alert and oriented to person, place, and time.   Psychiatric:         Thought Content: Thought content normal.                Vital Signs (Most Recent):  Temp: 98.2 °F (36.8 °C) (12/13/24 0807)  Pulse: 75 (12/13/24 0807)  Resp: 16 (12/13/24 0951)  BP: 102/68 (12/13/24 0807)  SpO2: (!) 87 % (12/13/24 0807) Vital Signs (24h Range):  Temp:  [97.7 °F (36.5 °C)-99.2 °F (37.3 °C)] 98.2 °F (36.8 °C)  Pulse:  [65-80] 75  Resp:  [16-18] 16  SpO2:  [87 %-99 %] 87 %  BP: ()/(60-68) 102/68     Weight: 57 kg (125 lb 10.6 oz)  Body mass index is 20.28 kg/m².      Intake/Output Summary (Last 24 hours) at 12/13/2024 1118  Last data filed at 12/13/2024 0636  Gross per 24 hour   Intake --   Output 484 ml   Net -484 ml       Significant Labs:  CBC:  No results for input(s): "WBC", "RBC", "HGB", "HCT", "PLT", "MCV", "MCH", "MCHC" in the last 72 hours.    BMP:  No results for input(s): "GLUCOSE", "NA", "K", "CL", "CO2", "BUN", "CREATININE", "CALCIUM" in the last 72 hours.     Tacrolimus Levels:  No results for input(s): "TACROLIMUS" in the last 72 hours.  Microbiology:  Microbiology Results (last 7 days)       Procedure Component Value Units Date/Time    Culture, Respiratory with Gram Stain [[id number]]     Order Status: No result Specimen: Sputum, Expectorated             I have reviewed all pertinent labs within the past 24 hours.    Diagnostic Results:CXR--Right-sided chest tube is unchanged in position. No definitive evidence for pneumothorax. When compared with the prior study, there is no significant change in the cardiopulmonary status of the patient. Subcutaneous air in the right lateral chest wall remains grossly " unchanged.       Assessment/Plan:     * Secondary spontaneous pneumothorax  Underwent VATS with doxycycline pleurodesis on 12/11. CXR much improved.  Chest tube to remain to suction for 48 hours, possibly change to water seal this evening.  Managed by thoracic surgery.        Lymphangioleiomyomatosis  Holding sirliomus for inflammatory reaction needed for pleurodesis and wound healing.   Will have patient follow up in LungTransplant clinic once discharged.        Darius Gamboa NP  Lung Transplant  [facility] Tele[facility]

## 2024-12-13 NOTE — CARE UPDATE
Thoracic Surgery Care Update:    Interval exam performed following repeat chest x-ray this afternoon. While her imaging is stable, she does continue to have an air leak with expiration at this time.     -Keep chest tube to suction overnight  -The suction may be briefly disconnected to allow ambulation to the bathroom etc. Please ensure it is returned to suction as soon as possible.   -Will assess for water seal trial on morning rounds tomorrow, 12/14/24    Chad Diggs MD  General Surgery, PGY-4

## 2024-12-13 NOTE — PROGRESS NOTES
12/13/24 1500   Vital Signs   Temp 98.8 °F (37.1 °C)   Temp Source Oral   Pulse 75   Heart Rate Source Monitor   Resp 18   SpO2 (!) 92 %   Pulse Oximetry Type Intermittent   Probe Placed On (Pulse Ox) ear   Oximetry Probe Status Applied   Flow (L/min) (Oxygen Therapy) 2   Device (Oxygen Therapy) nasal cannula   /64   MAP (mmHg) 76   BP Location Left arm   BP Method Automatic   Patient Position Lying     Upon performing patient rounds, pt complain of a headache and feeling hot- notified primary nurse and obtain a set of vital signs.. check pulse ox - she was 80% on rm air- applied o2 @ 2l- oxygen increase to 91% on 2l- updated primary nurse - explain the situation to patient.  Pt informed me that the surgeon remove her oxygen - primary nurse is aware to monitor oxygen level and to decrease oxygen to off.  Updated charge nurse

## 2024-12-13 NOTE — PROGRESS NOTES
[facility] Medicine Telemetry  Thoracic Surgery  Progress Note    Subjective:     History of Present Illness:  Ann-Marie Saavedra is a 44 y.o. 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 She underwent placement of pigtail chest tube in the ED. Thoracic surgery was consulted for persistent air leak.    Today she reports improvement in dyspnea and chest pain since placement of the chest tube. She is on 2L by NC. She is on Sirolimus, which was started on 12/3.     Post-Op Info:  Procedure(s) (LRB):  VATS, WITH PLEURODESIS (Right)  BLOCK, NERVE, INTERCOSTAL, 2 OR MORE (Right)   2 Days Post-Op     Interval History: Ms. Saavedra reports improved pain control over the last 24 hours. Chest tube off suction on my assessment this morning, unclear when this was disconnected. Placed back to -30 mmHg. Continued air leak. CXR stable.     Medications:  Continuous Infusions:  Scheduled Meds:   acetaminophen  1,000 mg Oral Q8H    doxycycline  50 mg Oral Q12H    enoxparin  40 mg Subcutaneous Daily    gabapentin  300 mg Oral QHS    LIDOcaine  1 patch Transdermal Q24H    senna  8.6 mg Oral Daily     PRN Meds:  Current Facility-Administered Medications:     albuterol, 2 puff, Inhalation, Q6H PRN    dextrose 10%, 12.5 g, Intravenous, PRN    dextrose 10%, 25 g, Intravenous, PRN    glucagon (human recombinant), 1 mg, Intramuscular, PRN    glucose, 16 g, Oral, PRN    glucose, 24 g, Oral, PRN    HYDROmorphone, 0.5 mg, Intravenous, Q3H PRN    naloxone, 0.02 mg, Intravenous, PRN    oxyCODONE, 15 mg, Oral, Q4H PRN    sodium chloride 0.9%, 10 mL, Intravenous, Q12H PRN     Review of patient's allergies indicates:  No Known Allergies  Objective:     Vital Signs (Most Recent):  Temp: 98.2 °F (36.8 °C) (12/13/24 0807)  Pulse: 75 (12/13/24 0807)  Resp: 17 (12/13/24 0807)  BP:  102/68 (12/13/24 0807)  SpO2: (!) 87 % (12/13/24 0807) Vital Signs (24h Range):  Temp:  [97.7 °F (36.5 °C)-99.2 °F (37.3 °C)] 98.2 °F (36.8 °C)  Pulse:  [65-80] 75  Resp:  [16-18] 17  SpO2:  [87 %-99 %] 87 %  BP: ()/(60-70) 102/68     Intake/Output - Last 3 Shifts         12/11 0700  12/12 0659 12/12 0700  12/13 0659 12/13 0700 12/14 0659    P.O. 140      IV Piggyback 2100      Total Intake(mL/kg) 2240 (39.3)      Urine (mL/kg/hr) 700 (0.5) 200 (0.1)     Stool       Chest Tube 480 284     Total Output 1180 484     Net +1060 -484                    SpO2: (!) 87 %        Physical Exam  Vitals reviewed.   Constitutional:       General: She is not in acute distress.     Appearance: She is not toxic-appearing.   HENT:      Head: Normocephalic and atraumatic.   Eyes:      Extraocular Movements: Extraocular movements intact.   Cardiovascular:      Rate and Rhythm: Normal rate.   Pulmonary:      Effort: Pulmonary effort is normal. No respiratory distress.      Comments:   CT to suction with air leak on forced expiration   Skin:     General: Skin is warm and dry.   Neurological:      General: No focal deficit present.      Mental Status: She is alert.            Significant Labs:  All pertinent labs from the last 24 hours have been reviewed.    Significant Diagnostics:  I have reviewed all pertinent imaging results/findings within the past 24 hours.    VTE Risk Mitigation (From admission, onward)           Ordered     enoxaparin injection 40 mg  Daily         12/07/24 1542     IP VTE LOW RISK PATIENT  Once         12/07/24 1542     Place sequential compression device  Until discontinued         12/07/24 1542                  Assessment/Plan:     Lymphangioleiomyomatosis  Ann-Marie Saavedra is a 44 y.o. female with recently diagnosed lymphangioleiomyomatosis c/b pneumothorax who was admitted following chest tube placement by ED staff on 12/7/24. Thoracic surgery was consulted for evaluation of ongoing air  leak.    Keep chest tube to suction this morning. Will trial placement to WS at 3 PM with 7 PM interval CXR   Hold sirliomus for inflammatory reaction needed for pleurodesis and wound healing for two weeks following OR   MMPC, Avoid NSAIDS following pleurodesis.   Daily CXR.     Thoracic surgery to continue to follow          Chad Diggs MD  Thoracic Surgery  [facility] - [facility] Tele[facility]

## 2024-12-13 NOTE — ASSESSMENT & PLAN NOTE
Ann-Marie Saavedra is a 44 y.o. female with recently diagnosed lymphangioleiomyomatosis c/b pneumothorax who was admitted following chest tube placement by ED staff on 12/7/24. Thoracic surgery was consulted for evaluation of ongoing air leak.    Keep chest tube to suction this morning. Will trial placement to WS at 3 PM with 7 PM interval CXR   Hold sirliomus for inflammatory reaction needed for pleurodesis and wound healing for two weeks following OR   MMPC, Avoid NSAIDS following pleurodesis.   Daily CXR.     Thoracic surgery to continue to follow

## 2024-12-13 NOTE — ASSESSMENT & PLAN NOTE
Senokot ii po BID and miralax 17 po BID for now  Consider normal saline enema and/or oral mag citrate and/or bisacodyl suppository  If all above fails, then to consider relistor

## 2024-12-13 NOTE — PLAN OF CARE
[facility] - Internal Medicine Telemetry  Discharge Reassessment    Primary Care Provider: Elsi Klein MD    Expected Discharge Date: 12/16/2024    Reassessment (most recent)       Discharge Reassessment - 12/12/24 1430          Discharge Reassessment    Assessment Type Discharge Planning Reassessment (P)      Did the patient's condition or plan change since previous assessment? No (P)      Discharge Plan discussed with: Patient;Sibling;Spouse/sig other (P)      Name(s) and Number(s) Fred Saavedra (Spouse)  [phone] (P)      Communicated DAVID with patient/caregiver Yes (P)      Discharge Plan A Home with family (P)      Discharge Plan B Home (P)      DME Needed Upon Discharge  none (P)      Transition of Care Barriers None (P)      Why the patient remains in the hospital Requires continued medical care (P)         Post-Acute Status    Coverage [facility] - (P)                  Discharge Plan A and Plan B have been determined by review of patient's clinical status, future medical and therapeutic needs, and coverage/benefits for post-acute care in coordination with multidisciplinary team members.     SW completed DP reassessment w/ patient and sibling at the bedside; spouse present via phone. Patient confirmed plan of dc home w/ family once medically ready. Patient requesting additional DAVID forms to have medical records sent to outside facilities. SW to provide forms as requested. Patient expressed no further request or concerns.     Patient to dc home. Family to provide transport once medically ready.            KENNETH Spivey, LMSW  [facility]  Case Management  Ext. [phone]

## 2024-12-14 PROCEDURE — 63600175 PHARM REV CODE 636 W HCPCS: Mod: NTX | Performed by: INTERNAL MEDICINE

## 2024-12-14 PROCEDURE — 11000001 HC ACUTE MED/SURG PRIVATE ROOM: Mod: NTX

## 2024-12-14 PROCEDURE — 25000003 PHARM REV CODE 250: Mod: NTX | Performed by: STUDENT IN AN ORGANIZED HEALTH CARE EDUCATION/TRAINING PROGRAM

## 2024-12-14 PROCEDURE — 63600175 PHARM REV CODE 636 W HCPCS: Mod: NTX | Performed by: STUDENT IN AN ORGANIZED HEALTH CARE EDUCATION/TRAINING PROGRAM

## 2024-12-14 PROCEDURE — 25000003 PHARM REV CODE 250: Mod: NTX | Performed by: INTERNAL MEDICINE

## 2024-12-14 RX ADMIN — OXYCODONE HYDROCHLORIDE 15 MG: 10 TABLET ORAL at 03:12

## 2024-12-14 RX ADMIN — LIDOCAINE 1 PATCH: 50 PATCH CUTANEOUS at 12:12

## 2024-12-14 RX ADMIN — ENOXAPARIN SODIUM 40 MG: 40 INJECTION SUBCUTANEOUS at 04:12

## 2024-12-14 RX ADMIN — OXYCODONE HYDROCHLORIDE 15 MG: 10 TABLET ORAL at 12:12

## 2024-12-14 RX ADMIN — HYDROMORPHONE HYDROCHLORIDE 0.5 MG: 1 INJECTION, SOLUTION INTRAMUSCULAR; INTRAVENOUS; SUBCUTANEOUS at 02:12

## 2024-12-14 RX ADMIN — GABAPENTIN 300 MG: 300 CAPSULE ORAL at 08:12

## 2024-12-14 RX ADMIN — ACETAMINOPHEN 1000 MG: 500 TABLET ORAL at 08:12

## 2024-12-14 RX ADMIN — DOXYCYCLINE 50 MG: 50 CAPSULE ORAL at 08:12

## 2024-12-14 RX ADMIN — ACETAMINOPHEN 1000 MG: 500 TABLET ORAL at 05:12

## 2024-12-14 RX ADMIN — POLYETHYLENE GLYCOL 3350 17 G: 17 POWDER, FOR SOLUTION ORAL at 08:12

## 2024-12-14 RX ADMIN — SENNOSIDES AND DOCUSATE SODIUM 2 TABLET: 50; 8.6 TABLET ORAL at 08:12

## 2024-12-14 RX ADMIN — OXYCODONE HYDROCHLORIDE 15 MG: 10 TABLET ORAL at 08:12

## 2024-12-14 NOTE — ASSESSMENT & PLAN NOTE
Ann-Marie Saavedra is a 44 y.o. female with recently diagnosed lymphangioleiomyomatosis c/b pneumothorax who was admitted following chest tube placement by ED staff on 12/7/24. Thoracic surgery was consulted for evaluation of ongoing air leak.    Water seal trial this morning. Repeat CXR at noon.   Hold sirliomus for inflammatory reaction needed for pleurodesis and wound healing for two weeks following OR   MMPC, Avoid NSAIDS following pleurodesis.   Daily CXR.     Thoracic surgery to continue to follow

## 2024-12-14 NOTE — SUBJECTIVE & OBJECTIVE
Interval History: No acute events overnight. HDS, intermittently on O2. CT with 40 cc of output recorded. CXR stable.     Medications:  Continuous Infusions:  Scheduled Meds:   acetaminophen  1,000 mg Oral Q8H    doxycycline  50 mg Oral Q12H    enoxparin  40 mg Subcutaneous Daily    gabapentin  300 mg Oral QHS    LIDOcaine  1 patch Transdermal Q24H    polyethylene glycol  17 g Oral BID    senna-docusate 8.6-50 mg  2 tablet Oral BID     PRN Meds:  Current Facility-Administered Medications:     albuterol, 2 puff, Inhalation, Q6H PRN    dextrose 10%, 12.5 g, Intravenous, PRN    dextrose 10%, 25 g, Intravenous, PRN    glucagon (human recombinant), 1 mg, Intramuscular, PRN    glucose, 16 g, Oral, PRN    glucose, 24 g, Oral, PRN    HYDROmorphone, 0.5 mg, Intravenous, Q3H PRN    naloxone, 0.02 mg, Intravenous, PRN    oxyCODONE, 15 mg, Oral, Q4H PRN    sodium chloride 0.9%, 10 mL, Intravenous, Q12H PRN     Review of patient's allergies indicates:  No Known Allergies  Objective:     Vital Signs (Most Recent):  Temp: 98.4 °F (36.9 °C) (12/14/24 0745)  Pulse: 63 (12/14/24 0745)  Resp: 17 (12/14/24 0745)  BP: (!) 96/59 (12/14/24 0745)  SpO2: (!) 92 % (12/14/24 0929) Vital Signs (24h Range):  Temp:  [97.6 °F (36.4 °C)-98.8 °F (37.1 °C)] 98.4 °F (36.9 °C)  Pulse:  [63-75] 63  Resp:  [16-18] 17  SpO2:  [81 %-98 %] 92 %  BP: ()/(56-74) 96/59     Intake/Output - Last 3 Shifts         12/12 0700  12/13 0659 12/13 0700 12/14 0659 12/14 0700  12/15 0659    P.O.       IV Piggyback       Total Intake(mL/kg)       Urine (mL/kg/hr) 200 (0.1)      Chest Tube 284 40     Total Output 484 40     Net -484 -40                    SpO2: (!) 92 %        Physical Exam  Vitals reviewed.   Constitutional:       General: She is not in acute distress.     Appearance: She is not toxic-appearing.   HENT:      Head: Normocephalic and atraumatic.   Eyes:      Extraocular Movements: Extraocular movements intact.   Cardiovascular:      Rate and Rhythm:  "Normal rate.   Pulmonary:      Effort: Pulmonary effort is normal. No respiratory distress.      Comments: CT to suction with air leak  Skin:     General: Skin is warm and dry.   Neurological:      General: No focal deficit present.      Mental Status: She is alert.            Significant Labs:  CBC: No results for input(s): "WBC", "RBC", "HGB", "HCT", "PLT", "MCV", "MCH", "MCHC" in the last 48 hours.  CMP: No results for input(s): "GLU", "CALCIUM", "ALBUMIN", "PROT", "NA", "K", "CO2", "CL", "BUN", "CREATININE", "ALKPHOS", "ALT", "AST", "BILITOT" in the last 48 hours.    Significant Diagnostics:  I have reviewed all pertinent imaging results/findings within the past 24 hours.    VTE Risk Mitigation (From admission, onward)           Ordered     enoxaparin injection 40 mg  Daily         12/07/24 1542     IP VTE LOW RISK PATIENT  Once         12/07/24 1542     Place sequential compression device  Until discontinued         12/07/24 1542                  "

## 2024-12-14 NOTE — NURSING
Chest tube connected to water seal, unhooked from wall suction by thoracic team. Serosaguineous drainage noted, mild oscillation on water seal also observed.    Saturation at 88-89% on room air, placed back of 2L oxygen via nasal cannula ; 92% saturation.

## 2024-12-14 NOTE — NURSING
Chest tube hooked back to wall suction set to medium  per Adrien Mds order. Saturation 96% on 2L oxygen via nasal cannula.

## 2024-12-14 NOTE — PLAN OF CARE
Problem: Adult Inpatient Plan of Care  Goal: Plan of Care Review  Outcome: Progressing  Goal: Patient-Specific Goal (Individualized)  Outcome: Progressing  Goal: Absence of Hospital-Acquired Illness or Injury  Outcome: Progressing  Goal: Optimal Comfort and Wellbeing  Outcome: Progressing  Goal: Readiness for Transition of Care  Outcome: Progressing     Problem: Respiratory Compromise (Pneumothorax)  Goal: Optimal Oxygenation and Ventilation  Outcome: Progressing     Problem: Wound  Goal: Optimal Coping  Outcome: Progressing  Goal: Optimal Functional Ability  Outcome: Progressing  Goal: Absence of Infection Signs and Symptoms  Outcome: Progressing  Goal: Improved Oral Intake  Outcome: Progressing  Goal: Optimal Pain Control and Function  Outcome: Progressing  Goal: Skin Health and Integrity  Outcome: Progressing  Goal: Optimal Wound Healing  Outcome: Progressing     Problem: Skin Injury Risk Increased  Goal: Skin Health and Integrity  Outcome: Progressing

## 2024-12-14 NOTE — PLAN OF CARE
Problem: Adult Inpatient Plan of Care  Goal: Plan of Care Review  Outcome: Progressing  Goal: Patient-Specific Goal (Individualized)  Outcome: Progressing  Goal: Absence of Hospital-Acquired Illness or Injury  Outcome: Progressing  Goal: Optimal Comfort and Wellbeing  Outcome: Progressing  Goal: Readiness for Transition of Care  Outcome: Progressing     Pt able to walk with nursing to restroom. Did well. Pain medication given as needed. Chest tube in place and continues on suction.   Problem: Respiratory Compromise (Pneumothorax)  Goal: Optimal Oxygenation and Ventilation  Outcome: Progressing

## 2024-12-15 PROCEDURE — 63600175 PHARM REV CODE 636 W HCPCS: Mod: NTX | Performed by: STUDENT IN AN ORGANIZED HEALTH CARE EDUCATION/TRAINING PROGRAM

## 2024-12-15 PROCEDURE — 11000001 HC ACUTE MED/SURG PRIVATE ROOM: Mod: NTX

## 2024-12-15 PROCEDURE — 25000003 PHARM REV CODE 250: Mod: NTX | Performed by: STUDENT IN AN ORGANIZED HEALTH CARE EDUCATION/TRAINING PROGRAM

## 2024-12-15 PROCEDURE — 25000003 PHARM REV CODE 250: Mod: NTX | Performed by: INTERNAL MEDICINE

## 2024-12-15 RX ADMIN — DOXYCYCLINE 50 MG: 50 CAPSULE ORAL at 08:12

## 2024-12-15 RX ADMIN — ACETAMINOPHEN 1000 MG: 500 TABLET ORAL at 04:12

## 2024-12-15 RX ADMIN — POLYETHYLENE GLYCOL 3350 17 G: 17 POWDER, FOR SOLUTION ORAL at 08:12

## 2024-12-15 RX ADMIN — ENOXAPARIN SODIUM 40 MG: 40 INJECTION SUBCUTANEOUS at 04:12

## 2024-12-15 RX ADMIN — OXYCODONE HYDROCHLORIDE 15 MG: 10 TABLET ORAL at 08:12

## 2024-12-15 RX ADMIN — OXYCODONE HYDROCHLORIDE 15 MG: 10 TABLET ORAL at 12:12

## 2024-12-15 RX ADMIN — OXYCODONE HYDROCHLORIDE 15 MG: 10 TABLET ORAL at 04:12

## 2024-12-15 RX ADMIN — ACETAMINOPHEN 1000 MG: 500 TABLET ORAL at 09:12

## 2024-12-15 RX ADMIN — GABAPENTIN 300 MG: 300 CAPSULE ORAL at 08:12

## 2024-12-15 RX ADMIN — SENNOSIDES AND DOCUSATE SODIUM 2 TABLET: 50; 8.6 TABLET ORAL at 08:12

## 2024-12-15 RX ADMIN — LIDOCAINE 1 PATCH: 50 PATCH CUTANEOUS at 01:12

## 2024-12-15 RX ADMIN — ACETAMINOPHEN 1000 MG: 500 TABLET ORAL at 01:12

## 2024-12-15 NOTE — PROGRESS NOTES
[facility] - Internal Medicine [facility] Medicine  Progress Note    Patient Name: Ann-Marie Saavedra  MRN: [id number]  Patient Class: IP- Inpatient   Admission Date: 12/7/2024  Length of Stay: 7 days  Attending Physician: Kasandra Meredith MD  Primary Care Provider: Elsi Klein MD        Subjective     Principal Problem:Secondary spontaneous pneumothorax        HPI:  43 yo recent diagnosis of lymphangioleiomyomatosis based on CT chest who presented with shortness of breath with known pneumothorax.       Of note, she has been having worsening shortness of breath for years, intermittently with chest pain that has limited her work. Mostly SOB with  activity that has limited her tennis/running and daily work. 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 She is currently being referred to advanced lung disease and lung transplant.     Seen by pulmonology in ED, chest tube placed in ED. Chest xray after chest tube placement shows adequate placement with interval decrease in pneumothorax without complete resolution. Being admitted to  for workup and management of PTX and hyperkalemia.     Overview/Hospital Course:  Patient had chest tube placed in ER. Pulmonary, transplant lung and thoracic surgery consulted. Still with air leak. So patient underwent VATS pleurodesis with doxycycline 12/11 and upsizing of chest tube. Holding sirolimus to allow inflammatory reaction to seal the PTX. Improving. Some post-op pain. Medication adjusted. Still with Chest tube.  Plan for  water seal trial again tomorrow      Interval History: NAEON. CT with 70cc . plan for water seal trial again tomorrow     Review of Systems   Constitutional:  Negative for appetite change.   Respiratory:  Negative for cough and shortness of breath.    Cardiovascular:  Negative for chest pain and leg swelling.    Gastrointestinal:  Negative for abdominal distention, abdominal pain, constipation and diarrhea.   Genitourinary:  Negative for difficulty urinating and dysuria.   Neurological:  Negative for dizziness and headaches.     Objective:     Vital Signs (Most Recent):  Temp: 98.4 °F (36.9 °C) (12/15/24 1554)  Pulse: 82 (12/15/24 1612)  Resp: 19 (12/15/24 1607)  BP: 110/69 (12/15/24 1612)  SpO2: 96 % (12/15/24 1554) Vital Signs (24h Range):  Temp:  [97.5 °F (36.4 °C)-98.4 °F (36.9 °C)] 98.4 °F (36.9 °C)  Pulse:  [63-82] 82  Resp:  [17-19] 19  SpO2:  [95 %-99 %] 96 %  BP: ()/(61-74) 110/69     Weight: 57 kg (125 lb 10.6 oz)  Body mass index is 20.28 kg/m².    Intake/Output Summary (Last 24 hours) at 12/15/2024 1649  Last data filed at 12/15/2024 1607  Gross per 24 hour   Intake 1000 ml   Output 110 ml   Net 890 ml      Physical Exam  Constitutional:       Appearance: Normal appearance.   HENT:      Head: Normocephalic and atraumatic.      Mouth/Throat:      Mouth: Mucous membranes are moist.   Cardiovascular:      Rate and Rhythm: Normal rate and regular rhythm.      Pulses: Normal pulses.      Heart sounds: Normal heart sounds.   Pulmonary:      Effort: Pulmonary effort is normal.      Breath sounds: Normal breath sounds. No rales.      Comments: CT in place   Abdominal:      General: Abdomen is flat. Bowel sounds are normal. There is no distension.      Palpations: Abdomen is soft.      Tenderness: There is no abdominal tenderness.   Musculoskeletal:         General: Normal range of motion.      Cervical back: Normal range of motion and neck supple.   Skin:     General: Skin is warm and dry.   Neurological:      General: No focal deficit present.      Mental Status: She is alert and oriented to person, place, and time.   Psychiatric:         Mood and Affect: Mood normal.         Computed MELD 3.0 unavailable. One or more values for this score either were not found within the given timeframe or did not fit some  "other criterion.  Computed MELD-Na unavailable. One or more values for this score either were not found within the given timeframe or did not fit some other criterion.      Significant Labs:  CBC:  No results for input(s): "WBC", "HGB", "HCT", "PLT" in the last 48 hours.  CMP:  No results for input(s): "NA", "K", "CL", "CO2", "GLU", "BUN", "CREATININE", "CALCIUM", "PROT", "ALBUMIN", "BILITOT", "ALKPHOS", "AST", "ALT", "ANIONGAP", "EGFRNONAA" in the last 48 hours.    Invalid input(s): "ESTGFAFRICA"  PTINR:  No results for input(s): "INR" in the last 48 hours.    Significant Procedures:   Dobutamine Stress Test with Color Flow: No results found for this or any previous visit.    None    Assessment and Plan     * Secondary spontaneous pneumothorax  lymphangioleiomyomatosis  CXR/Ct chest on admission with PTX   S/p chest tube placement in ED  Pulmonology consulted and managing CT  Supplemental O2 via NC prn  PTX improved but not resolved and with persistent air leak initially  CT surgery consulted for assistance in management  Lung transplant consulted  Patient to underwent doxycycline VATS 11/11 - improved pneumothorax as well as SOB   Hold sirolimus to allow for inflammatory response of pleurodesis  plan for water seal trial again tomorrow     Opioid-induced constipation  Senokot ii po BID and miralax 17 po BID for now  Consider normal saline enema and/or oral mag citrate and/or bisacodyl suppository  If all above fails, then to consider relistor    Post-op pain  Pain control: Oxycodone 15 q 4 PRN and dilaudid 0.5 mg q 3 PRN  Acetaminophen 1 g TID  Gabapentin 300 mg qhs  Lidocaine patch to area of pain  Ultimately can  de-ecalate to low dose opioid therapy with some lidocaine patches at discharge  NO NSAIDS (to allow for inflammatory reaction to pleurodesis)    Hyperkalemia  Hyperkalemia is likely due to  Unclear cause .The patients most recent potassium results are listed below.  Recent Labs     12/09/24  0238 " 12/10/24  0236   K 3.9 3.8     Plan  - Monitor for arrhythmias with EKG and/or continuous telemetry.   - Repeat K - if still elevated will treat .   - Monitor potassium: Every 12 hours    Lymphangioleiomyomatosis  Recent diagnosis   Being referred as outpatient to advanced lung disease and lung transplant  sirolimus 1 mg daily - held for pneumothorax healing  Pulm following    Rosacea  C/w home doxycycline daily        VTE Risk Mitigation (From admission, onward)           Ordered     enoxaparin injection 40 mg  Daily         12/07/24 1542     IP VTE LOW RISK PATIENT  Once         12/07/24 1542     Place sequential compression device  Until discontinued         12/07/24 1542                    Discharge Planning   DAVID: 12/15/2024     Code Status: Full Code   Medical Readiness for Discharge Date:   Discharge Plan A: Home with family                        Kasandra Meredith MD  Department of Hospital Medicine   [facility] - Internal Medicine Telemetry

## 2024-12-15 NOTE — SUBJECTIVE & OBJECTIVE
Interval History: No acute events overnight. HDS on 2L. Pain well controlled. CT with 70 cc out. CXR with no pneumothorax.     Medications:  Continuous Infusions:  Scheduled Meds:   acetaminophen  1,000 mg Oral Q8H    doxycycline  50 mg Oral Q12H    enoxparin  40 mg Subcutaneous Daily    gabapentin  300 mg Oral QHS    LIDOcaine  1 patch Transdermal Q24H    polyethylene glycol  17 g Oral BID    senna-docusate 8.6-50 mg  2 tablet Oral BID     PRN Meds:  Current Facility-Administered Medications:     albuterol, 2 puff, Inhalation, Q6H PRN    dextrose 10%, 12.5 g, Intravenous, PRN    dextrose 10%, 25 g, Intravenous, PRN    glucagon (human recombinant), 1 mg, Intramuscular, PRN    glucose, 16 g, Oral, PRN    glucose, 24 g, Oral, PRN    HYDROmorphone, 0.5 mg, Intravenous, Q3H PRN    naloxone, 0.02 mg, Intravenous, PRN    oxyCODONE, 15 mg, Oral, Q4H PRN    sodium chloride 0.9%, 10 mL, Intravenous, Q12H PRN     Review of patient's allergies indicates:  No Known Allergies  Objective:     Vital Signs (Most Recent):  Temp: 98.3 °F (36.8 °C) (12/15/24 0800)  Pulse: 65 (12/15/24 0800)  Resp: 19 (12/15/24 0827)  BP: 100/65 (12/15/24 0800)  SpO2: 95 % (12/15/24 0800) Vital Signs (24h Range):  Temp:  [97.5 °F (36.4 °C)-98.3 °F (36.8 °C)] 98.3 °F (36.8 °C)  Pulse:  [63-73] 65  Resp:  [17-19] 19  SpO2:  [88 %-97 %] 95 %  BP: (100-113)/(60-74) 100/65     Intake/Output - Last 3 Shifts         12/13 0700 12/14 0659 12/14 0700  12/15 0659 12/15 0700  12/16 0659    P.O.  1000     Total Intake(mL/kg)  1000 (17.5)     Urine (mL/kg/hr)  0 (0)     Stool  0     Chest Tube 40 100     Total Output 40 100     Net -40 +900            Urine Occurrence  1 x     Stool Occurrence  0 x             SpO2: 95 %        Physical Exam  Vitals reviewed.   Constitutional:       General: She is not in acute distress.     Appearance: She is not toxic-appearing.   HENT:      Head: Normocephalic and atraumatic.   Eyes:      Extraocular Movements: Extraocular  "movements intact.   Cardiovascular:      Rate and Rhythm: Normal rate.   Pulmonary:      Effort: Pulmonary effort is normal. No respiratory distress.      Comments: CT to suction with air leak  Skin:     General: Skin is warm and dry.   Neurological:      General: No focal deficit present.      Mental Status: She is alert.            Significant Labs:  CBC: No results for input(s): "WBC", "RBC", "HGB", "HCT", "PLT", "MCV", "MCH", "MCHC" in the last 48 hours.  CMP: No results for input(s): "GLU", "CALCIUM", "ALBUMIN", "PROT", "NA", "K", "CO2", "CL", "BUN", "CREATININE", "ALKPHOS", "ALT", "AST", "BILITOT" in the last 48 hours.  All pertinent labs from the last 24 hours have been reviewed.    Significant Diagnostics:  I have reviewed all pertinent imaging results/findings within the past 24 hours.    VTE Risk Mitigation (From admission, onward)           Ordered     enoxaparin injection 40 mg  Daily         12/07/24 1542     IP VTE LOW RISK PATIENT  Once         12/07/24 1542     Place sequential compression device  Until discontinued         12/07/24 1542                  "

## 2024-12-15 NOTE — PLAN OF CARE
Problem: Adult Inpatient Plan of Care  Goal: Plan of Care Review  Outcome: Progressing  Goal: Patient-Specific Goal (Individualized)  Outcome: Progressing  Goal: Absence of Hospital-Acquired Illness or Injury  Outcome: Progressing  Goal: Optimal Comfort and Wellbeing  Outcome: Progressing  Goal: Readiness for Transition of Care  Outcome: Progressing     Problem: Respiratory Compromise (Pneumothorax)  Goal: Optimal Oxygenation and Ventilation  Outcome: Progressing     Problem: Wound  Goal: Optimal Coping  Outcome: Progressing  Goal: Optimal Functional Ability  Outcome: Progressing  Goal: Absence of Infection Signs and Symptoms  Outcome: Progressing  Goal: Improved Oral Intake  Outcome: Progressing  Goal: Optimal Pain Control and Function  Outcome: Progressing  Goal: Skin Health and Integrity  Outcome: Progressing  Goal: Optimal Wound Healing  Outcome: Progressing     Problem: Skin Injury Risk Increased  Goal: Skin Health and Integrity  Outcome: Progressing     30 cc output on right CT

## 2024-12-15 NOTE — PLAN OF CARE
Problem: Adult Inpatient Plan of Care  Goal: Plan of Care Review  Outcome: Progressing  Goal: Patient-Specific Goal (Individualized)  Outcome: Progressing  Goal: Absence of Hospital-Acquired Illness or Injury  Outcome: Progressing  Goal: Optimal Comfort and Wellbeing  Outcome: Progressing  Goal: Readiness for Transition of Care  Outcome: Progressing     Problem: Respiratory Compromise (Pneumothorax)  Goal: Optimal Oxygenation and Ventilation  Outcome: Progressing     Problem: Wound  Goal: Optimal Coping  Outcome: Progressing  Goal: Optimal Functional Ability  Outcome: Progressing  Goal: Absence of Infection Signs and Symptoms  Outcome: Progressing  Goal: Improved Oral Intake  Outcome: Progressing  Goal: Optimal Pain Control and Function  Outcome: Progressing  Goal: Skin Health and Integrity  Outcome: Progressing  Goal: Optimal Wound Healing  Outcome: Progressing     Problem: Skin Injury Risk Increased  Goal: Skin Health and Integrity  Outcome: Progressing     Problem: Fall Injury Risk  Goal: Absence of Fall and Fall-Related Injury  Outcome: Progressing     Problem: Pain Acute  Goal: Optimal Pain Control and Function  Outcome: Progressing

## 2024-12-15 NOTE — SUBJECTIVE & OBJECTIVE
Interval History: NAEON. CT with 70cc . plan for water seal trial again tomorrow     Review of Systems   Constitutional:  Negative for appetite change.   Respiratory:  Negative for cough and shortness of breath.    Cardiovascular:  Negative for chest pain and leg swelling.   Gastrointestinal:  Negative for abdominal distention, abdominal pain, constipation and diarrhea.   Genitourinary:  Negative for difficulty urinating and dysuria.   Neurological:  Negative for dizziness and headaches.     Objective:     Vital Signs (Most Recent):  Temp: 98.4 °F (36.9 °C) (12/15/24 1554)  Pulse: 82 (12/15/24 1612)  Resp: 19 (12/15/24 1607)  BP: 110/69 (12/15/24 1612)  SpO2: 96 % (12/15/24 1554) Vital Signs (24h Range):  Temp:  [97.5 °F (36.4 °C)-98.4 °F (36.9 °C)] 98.4 °F (36.9 °C)  Pulse:  [63-82] 82  Resp:  [17-19] 19  SpO2:  [95 %-99 %] 96 %  BP: ()/(61-74) 110/69     Weight: 57 kg (125 lb 10.6 oz)  Body mass index is 20.28 kg/m².    Intake/Output Summary (Last 24 hours) at 12/15/2024 1649  Last data filed at 12/15/2024 1607  Gross per 24 hour   Intake 1000 ml   Output 110 ml   Net 890 ml      Physical Exam  Constitutional:       Appearance: Normal appearance.   HENT:      Head: Normocephalic and atraumatic.      Mouth/Throat:      Mouth: Mucous membranes are moist.   Cardiovascular:      Rate and Rhythm: Normal rate and regular rhythm.      Pulses: Normal pulses.      Heart sounds: Normal heart sounds.   Pulmonary:      Effort: Pulmonary effort is normal.      Breath sounds: Normal breath sounds. No rales.      Comments: CT in place   Abdominal:      General: Abdomen is flat. Bowel sounds are normal. There is no distension.      Palpations: Abdomen is soft.      Tenderness: There is no abdominal tenderness.   Musculoskeletal:         General: Normal range of motion.      Cervical back: Normal range of motion and neck supple.   Skin:     General: Skin is warm and dry.   Neurological:      General: No focal deficit  "present.      Mental Status: She is alert and oriented to person, place, and time.   Psychiatric:         Mood and Affect: Mood normal.         Computed MELD 3.0 unavailable. One or more values for this score either were not found within the given timeframe or did not fit some other criterion.  Computed MELD-Na unavailable. One or more values for this score either were not found within the given timeframe or did not fit some other criterion.      Significant Labs:  CBC:  No results for input(s): "WBC", "HGB", "HCT", "PLT" in the last 48 hours.  CMP:  No results for input(s): "NA", "K", "CL", "CO2", "GLU", "BUN", "CREATININE", "CALCIUM", "PROT", "ALBUMIN", "BILITOT", "ALKPHOS", "AST", "ALT", "ANIONGAP", "EGFRNONAA" in the last 48 hours.    Invalid input(s): "ESTGFAFRICA"  PTINR:  No results for input(s): "INR" in the last 48 hours.    Significant Procedures:   Dobutamine Stress Test with Color Flow: No results found for this or any previous visit.    None  "

## 2024-12-15 NOTE — ASSESSMENT & PLAN NOTE
Ann-Marie Saavedra is a 44 y.o. female with recently diagnosed lymphangioleiomyomatosis c/b pneumothorax who was admitted following chest tube placement by ED staff on 12/7/24. Thoracic surgery was consulted for evaluation of ongoing air leak.    Continue to CT to suction today. Pneumothorax at the base has now resolved. Will plan for water seal trial again tomorrow.   Hold sirliomus for inflammatory reaction needed for pleurodesis and wound healing for two weeks following OR   MMPC, Avoid NSAIDS following pleurodesis.   Daily CXR.     Thoracic surgery to continue to follow

## 2024-12-15 NOTE — PROGRESS NOTES
[facility]  Thoracic Surgery  Progress Note    Subjective:     History of Present Illness:  Ann-Marie Saavedra is a 44 y.o. 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 She underwent placement of pigtail chest tube in the ED. Thoracic surgery was consulted for persistent air leak.    Today she reports improvement in dyspnea and chest pain since placement of the chest tube. She is on 2L by NC. She is on Sirolimus, which was started on 12/3.     Post-Op Info:  Procedure(s) (LRB):  VATS, WITH PLEURODESIS (Right)  BLOCK, NERVE, INTERCOSTAL, 2 OR MORE (Right)   4 Days Post-Op     Interval History: No acute events overnight. HDS on 2L. Pain well controlled. CT with 70 cc out. CXR with no pneumothorax.     Medications:  Continuous Infusions:  Scheduled Meds:   acetaminophen  1,000 mg Oral Q8H    doxycycline  50 mg Oral Q12H    enoxparin  40 mg Subcutaneous Daily    gabapentin  300 mg Oral QHS    LIDOcaine  1 patch Transdermal Q24H    polyethylene glycol  17 g Oral BID    senna-docusate 8.6-50 mg  2 tablet Oral BID     PRN Meds:  Current Facility-Administered Medications:     albuterol, 2 puff, Inhalation, Q6H PRN    dextrose 10%, 12.5 g, Intravenous, PRN    dextrose 10%, 25 g, Intravenous, PRN    glucagon (human recombinant), 1 mg, Intramuscular, PRN    glucose, 16 g, Oral, PRN    glucose, 24 g, Oral, PRN    HYDROmorphone, 0.5 mg, Intravenous, Q3H PRN    naloxone, 0.02 mg, Intravenous, PRN    oxyCODONE, 15 mg, Oral, Q4H PRN    sodium chloride 0.9%, 10 mL, Intravenous, Q12H PRN     Review of patient's allergies indicates:  No Known Allergies  Objective:     Vital Signs (Most Recent):  Temp: 98.3 °F (36.8 °C) (12/15/24 0800)  Pulse: 65 (12/15/24 0800)  Resp: 19 (12/15/24 0827)  BP: 100/65 (12/15/24 0800)  SpO2: 95 % (12/15/24 0800)  "Vital Signs (24h Range):  Temp:  [97.5 °F (36.4 °C)-98.3 °F (36.8 °C)] 98.3 °F (36.8 °C)  Pulse:  [63-73] 65  Resp:  [17-19] 19  SpO2:  [88 %-97 %] 95 %  BP: (100-113)/(60-74) 100/65     Intake/Output - Last 3 Shifts         12/13 0700  12/14 0659 12/14 0700  12/15 0659 12/15 0700  12/16 0659    P.O.  1000     Total Intake(mL/kg)  1000 (17.5)     Urine (mL/kg/hr)  0 (0)     Stool  0     Chest Tube 40 100     Total Output 40 100     Net -40 +900            Urine Occurrence  1 x     Stool Occurrence  0 x             SpO2: 95 %        Physical Exam  Vitals reviewed.   Constitutional:       General: She is not in acute distress.     Appearance: She is not toxic-appearing.   HENT:      Head: Normocephalic and atraumatic.   Eyes:      Extraocular Movements: Extraocular movements intact.   Cardiovascular:      Rate and Rhythm: Normal rate.   Pulmonary:      Effort: Pulmonary effort is normal. No respiratory distress.      Comments: CT to suction with air leak  Skin:     General: Skin is warm and dry.   Neurological:      General: No focal deficit present.      Mental Status: She is alert.            Significant Labs:  CBC: No results for input(s): "WBC", "RBC", "HGB", "HCT", "PLT", "MCV", "MCH", "MCHC" in the last 48 hours.  CMP: No results for input(s): "GLU", "CALCIUM", "ALBUMIN", "PROT", "NA", "K", "CO2", "CL", "BUN", "CREATININE", "ALKPHOS", "ALT", "AST", "BILITOT" in the last 48 hours.  All pertinent labs from the last 24 hours have been reviewed.    Significant Diagnostics:  I have reviewed all pertinent imaging results/findings within the past 24 hours.    VTE Risk Mitigation (From admission, onward)           Ordered     enoxaparin injection 40 mg  Daily         12/07/24 1542     IP VTE LOW RISK PATIENT  Once         12/07/24 1542     Place sequential compression device  Until discontinued         12/07/24 1542                  Assessment/Plan:     Lymphangioleiomyomatosis  Ann-Marie Saavedra is a 44 y.o. female " with recently diagnosed lymphangioleiomyomatosis c/b pneumothorax who was admitted following chest tube placement by ED staff on 12/7/24. Thoracic surgery was consulted for evaluation of ongoing air leak.    Continue to CT to suction today. Pneumothorax at the base has now resolved. Will plan for water seal trial again tomorrow.   Hold sirliomus for inflammatory reaction needed for pleurodesis and wound healing for two weeks following OR   MMPC, Avoid NSAIDS following pleurodesis.   Daily CXR.     Thoracic surgery to continue to follow          Enedina Nicole MD  Thoracic Surgery  [facility]

## 2024-12-16 ENCOUNTER — TELEPHONE (OUTPATIENT)
Dept: INTERNAL MEDICINE | Facility: CLINIC | Age: 44
End: 2024-12-16
Payer: COMMERCIAL

## 2024-12-16 PROCEDURE — 25000003 PHARM REV CODE 250: Mod: NTX | Performed by: INTERNAL MEDICINE

## 2024-12-16 PROCEDURE — 63600175 PHARM REV CODE 636 W HCPCS: Mod: NTX | Performed by: STUDENT IN AN ORGANIZED HEALTH CARE EDUCATION/TRAINING PROGRAM

## 2024-12-16 PROCEDURE — 25000003 PHARM REV CODE 250: Mod: NTX | Performed by: STUDENT IN AN ORGANIZED HEALTH CARE EDUCATION/TRAINING PROGRAM

## 2024-12-16 PROCEDURE — 11000001 HC ACUTE MED/SURG PRIVATE ROOM: Mod: NTX

## 2024-12-16 RX ADMIN — SENNOSIDES AND DOCUSATE SODIUM 2 TABLET: 50; 8.6 TABLET ORAL at 10:12

## 2024-12-16 RX ADMIN — POLYETHYLENE GLYCOL 3350 17 G: 17 POWDER, FOR SOLUTION ORAL at 10:12

## 2024-12-16 RX ADMIN — ACETAMINOPHEN 1000 MG: 500 TABLET ORAL at 05:12

## 2024-12-16 RX ADMIN — OXYCODONE HYDROCHLORIDE 15 MG: 10 TABLET ORAL at 10:12

## 2024-12-16 RX ADMIN — DOXYCYCLINE 50 MG: 50 CAPSULE ORAL at 10:12

## 2024-12-16 RX ADMIN — ACETAMINOPHEN 1000 MG: 500 TABLET ORAL at 03:12

## 2024-12-16 RX ADMIN — OXYCODONE HYDROCHLORIDE 15 MG: 10 TABLET ORAL at 05:12

## 2024-12-16 RX ADMIN — GABAPENTIN 300 MG: 300 CAPSULE ORAL at 10:12

## 2024-12-16 RX ADMIN — OXYCODONE HYDROCHLORIDE 15 MG: 10 TABLET ORAL at 06:12

## 2024-12-16 RX ADMIN — ENOXAPARIN SODIUM 40 MG: 40 INJECTION SUBCUTANEOUS at 06:12

## 2024-12-16 RX ADMIN — LIDOCAINE 1 PATCH: 50 PATCH CUTANEOUS at 03:12

## 2024-12-16 RX ADMIN — OXYCODONE HYDROCHLORIDE 15 MG: 10 TABLET ORAL at 12:12

## 2024-12-16 RX ADMIN — ACETAMINOPHEN 1000 MG: 500 TABLET ORAL at 10:12

## 2024-12-16 NOTE — PROGRESS NOTES
[facility]  Progress Note    Patient Name: Ann-Marie Saavedra  MRN: [id number]  Patient Class: IP- Inpatient   Admission Date: 12/7/2024  Length of Stay: 8 days  Attending Physician: Suellen Lipscomb MD  Primary Care Provider: Elsi Klein MD        Subjective     Principal Problem:Secondary spontaneous pneumothorax        HPI:  43 yo recent diagnosis of lymphangioleiomyomatosis based on CT chest who presented with shortness of breath with known pneumothorax.       Of note, she has been having worsening shortness of breath for years, intermittently with chest pain that has limited her work. Mostly SOB with  activity that has limited her tennis/running and daily work. 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 She is currently being referred to advanced lung disease and lung transplant.     Seen by pulmonology in ED, chest tube placed in ED. Chest xray after chest tube placement shows adequate placement with interval decrease in pneumothorax without complete resolution. Being admitted to  for workup and management of PTX and hyperkalemia.     Overview/Hospital Course:  Patient had chest tube placed in ER. Pulmonary, transplant lung and thoracic surgery consulted. Still with air leak. So patient underwent VATS pleurodesis with doxycycline 12/11 and upsizing of chest tube. Holding sirolimus to allow inflammatory reaction to seal the PTX. Still with Chest tube.  Currently on water seal trial again this morning - 12/16. If it PTX does not increase, will try heimlich valve. If it does increase, will need bleomycin pleurodesis.     Interval History: No acute events overnight. Patient has no acute complaints. Denies chest pain, SOB, n/v, c/d. Pain is well managed on current regimen.       Review of Systems  Objective:     Vital Signs  (Most Recent):  Temp: 97.4 °F (36.3 °C) (12/16/24 0749)  Pulse: 67 (12/16/24 0749)  Resp: 16 (12/16/24 1027)  BP: 104/68 (12/16/24 0749)  SpO2: 96 % (12/16/24 0749) Vital Signs (24h Range):  Temp:  [97.4 °F (36.3 °C)-98.9 °F (37.2 °C)] 97.4 °F (36.3 °C)  Pulse:  [54-82] 67  Resp:  [16-19] 16  SpO2:  [95 %-99 %] 96 %  BP: ()/(54-69) 104/68     Weight: 57 kg (125 lb 10.6 oz)  Body mass index is 20.28 kg/m².    Intake/Output Summary (Last 24 hours) at 12/16/2024 1113  Last data filed at 12/15/2024 1607  Gross per 24 hour   Intake --   Output 30 ml   Net -30 ml      Physical Exam  Vitals and nursing note reviewed.   Constitutional:       General: She is not in acute distress.     Appearance: Normal appearance. She is not ill-appearing or toxic-appearing.   HENT:      Head: Normocephalic.   Eyes:      General: No scleral icterus.  Cardiovascular:      Rate and Rhythm: Normal rate and regular rhythm.      Heart sounds: Normal heart sounds. No murmur heard.  Pulmonary:      Effort: No respiratory distress.      Breath sounds: Examination of the right-lower field reveals decreased breath sounds. Decreased breath sounds present. No wheezing or rales.   Abdominal:      General: Abdomen is flat.      Palpations: Abdomen is soft.   Musculoskeletal:      Right lower leg: No edema.      Left lower leg: No edema.   Skin:     General: Skin is warm and dry.   Neurological:      General: No focal deficit present.      Mental Status: She is alert and oriented to person, place, and time.   Psychiatric:         Mood and Affect: Mood normal.         Behavior: Behavior normal.         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 S/p chest tube placement in ED. Initially, pulmonology was consulted and managing CT. PTX improved but not resolved and with persistent air leak. CT surgery consulted for assistance in management. Lung transplant  consulted  Patient to underwent doxycycline VATS 11/11 - improved pneumothorax as well as SOB  - Hold sirolimus to allow for inflammatory response of pleurodesis  - daily CXR  - plan for water seal trial today  > If stable will place Heimlich valve, if worsening will replace to suction and plan for bleomycin pleurodesis at bedside    Lymphangioleiomyomatosis  Recent diagnosis   Being referred as outpatient to advanced lung disease and lung transplant  sirolimus 1 mg daily - held for pneumothorax healing  Pulm following    Opioid-induced constipation  Senokot ii po BID and miralax 17 po BID for now      Post-op pain  Pain control: Oxycodone 15 q 4 PRN and dilaudid 0.5 mg q 3 PRN  Acetaminophen 1 g TID  Gabapentin 300 mg qhs  Lidocaine patch to area of pain  Ultimately can  de-ecalate to low dose opioid therapy with some lidocaine patches at discharge  NO NSAIDS (to allow for inflammatory reaction to pleurodesis)    Hyperkalemia  Resolved    Rosacea  C/w home doxycycline daily        VTE Risk Mitigation (From admission, onward)           Ordered     enoxaparin injection 40 mg  Daily         12/07/24 1542     IP VTE LOW RISK PATIENT  Once         12/07/24 1542     Place sequential compression device  Until discontinued         12/07/24 1542                    Discharge Planning   DAVID: 12/17/2024     Code Status: Full Code   Medical Readiness for Discharge Date:   Discharge Plan A: Home with family                        Suellen Lipscomb MD  Department of Hospital Medicine   [facility]

## 2024-12-16 NOTE — SUBJECTIVE & OBJECTIVE
Interval History: Ms. Saavedra denies any new respiratory complaints this morning. Chest tube with intermittent expiratory leak, but this has improved from my last exam on Friday.     Medications:  Continuous Infusions:  Scheduled Meds:   acetaminophen  1,000 mg Oral Q8H    doxycycline  50 mg Oral Q12H    enoxparin  40 mg Subcutaneous Daily    gabapentin  300 mg Oral QHS    LIDOcaine  1 patch Transdermal Q24H    polyethylene glycol  17 g Oral BID    senna-docusate 8.6-50 mg  2 tablet Oral BID     PRN Meds:  Current Facility-Administered Medications:     albuterol, 2 puff, Inhalation, Q6H PRN    dextrose 10%, 12.5 g, Intravenous, PRN    dextrose 10%, 25 g, Intravenous, PRN    glucagon (human recombinant), 1 mg, Intramuscular, PRN    glucose, 16 g, Oral, PRN    glucose, 24 g, Oral, PRN    HYDROmorphone, 0.5 mg, Intravenous, Q3H PRN    naloxone, 0.02 mg, Intravenous, PRN    oxyCODONE, 15 mg, Oral, Q4H PRN    sodium chloride 0.9%, 10 mL, Intravenous, Q12H PRN     Review of patient's allergies indicates:  No Known Allergies  Objective:     Vital Signs (Most Recent):  Temp: 97.4 °F (36.3 °C) (12/16/24 0749)  Pulse: 67 (12/16/24 0749)  Resp: 17 (12/16/24 0749)  BP: 104/68 (12/16/24 0749)  SpO2: 96 % (12/16/24 0749) Vital Signs (24h Range):  Temp:  [97.4 °F (36.3 °C)-98.9 °F (37.2 °C)] 97.4 °F (36.3 °C)  Pulse:  [54-82] 67  Resp:  [17-19] 17  SpO2:  [95 %-99 %] 96 %  BP: ()/(54-69) 104/68     Intake/Output - Last 3 Shifts         12/14 0700  12/15 0659 12/15 0700 12/16 0659 12/16 0700 12/17 0659    P.O. 1000      Total Intake(mL/kg) 1000 (17.5)      Urine (mL/kg/hr) 0 (0)      Stool 0      Chest Tube 100 30     Total Output 100 30     Net +900 -30            Urine Occurrence 1 x      Stool Occurrence 0 x              SpO2: 96 %        Physical Exam  Vitals reviewed.   Constitutional:       General: She is not in acute distress.     Appearance: She is not toxic-appearing.   HENT:      Head: Normocephalic and  atraumatic.   Eyes:      Extraocular Movements: Extraocular movements intact.   Cardiovascular:      Rate and Rhythm: Normal rate.   Pulmonary:      Effort: Pulmonary effort is normal. No respiratory distress.      Comments: CT to suction with air leak  Skin:     General: Skin is warm and dry.   Neurological:      General: No focal deficit present.      Mental Status: She is alert.            Significant Labs:  All pertinent labs from the last 24 hours have been reviewed.    Significant Diagnostics:  I have reviewed and interpreted all pertinent imaging results/findings within the past 24 hours.    VTE Risk Mitigation (From admission, onward)           Ordered     enoxaparin injection 40 mg  Daily         12/07/24 1542     IP VTE LOW RISK PATIENT  Once         12/07/24 1542     Place sequential compression device  Until discontinued         12/07/24 1542

## 2024-12-16 NOTE — ASSESSMENT & PLAN NOTE
Ann-Marie Saavedra is a 44 y.o. female with recently diagnosed lymphangioleiomyomatosis c/b pneumothorax who was admitted following chest tube placement by ED staff on 12/7/24. Thoracic surgery was consulted for evaluation of ongoing air leak.    CT placed to WS this morning. AM CXR performed approximately 1.5 hours later with interval increase in apical pneumothorax.   Will obtain additional CXR at 11AM. If stable will place Heimlich valve, if worsening will replace to suction and plan for bleomycin pleurodesis at bedside  Hold sirliomus for inflammatory reaction needed for pleurodesis and wound healing for two weeks following OR   MMPC, Avoid NSAIDS following pleurodesis.   Daily CXR.     Thoracic surgery to continue to follow

## 2024-12-16 NOTE — ASSESSMENT & PLAN NOTE
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 S/p chest tube placement in ED. Initially, pulmonology was consulted and managing CT. PTX improved but not resolved and with persistent air leak. CT surgery consulted for assistance in management. Lung transplant consulted  Patient to underwent doxycycline VATS 11/11 - improved pneumothorax as well as SOB  - Hold sirolimus to allow for inflammatory response of pleurodesis  - daily CXR  - plan for water seal trial today  > If stable will place Heimlich valve, if worsening will replace to suction and plan for bleomycin pleurodesis at bedside

## 2024-12-16 NOTE — TELEPHONE ENCOUNTER
Received fax from [facility] requesting clinical notes, labs, medication list, and care opportunities for pt. Faxed requested documents to [phone].

## 2024-12-16 NOTE — PROGRESS NOTES
[facility] Medicine Tele[facility]  Thoracic Surgery  Progress Note    Subjective:     History of Present Illness:  Ann-Marie Saavedra is a 44 y.o. 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 She underwent placement of pigtail chest tube in the ED. Thoracic surgery was consulted for persistent air leak.    Today she reports improvement in dyspnea and chest pain since placement of the chest tube. She is on 2L by NC. She is on Sirolimus, which was started on 12/3.     Post-Op Info:  Procedure(s) (LRB):  VATS, WITH PLEURODESIS (Right)  BLOCK, NERVE, INTERCOSTAL, 2 OR MORE (Right)   5 Days Post-Op     Interval History: Ms. Saavedra denies any new respiratory complaints this morning. Chest tube with intermittent expiratory leak, but this has improved from my last exam on Friday.     Medications:  Continuous Infusions:  Scheduled Meds:   acetaminophen  1,000 mg Oral Q8H    doxycycline  50 mg Oral Q12H    enoxparin  40 mg Subcutaneous Daily    gabapentin  300 mg Oral QHS    LIDOcaine  1 patch Transdermal Q24H    polyethylene glycol  17 g Oral BID    senna-docusate 8.6-50 mg  2 tablet Oral BID     PRN Meds:  Current Facility-Administered Medications:     albuterol, 2 puff, Inhalation, Q6H PRN    dextrose 10%, 12.5 g, Intravenous, PRN    dextrose 10%, 25 g, Intravenous, PRN    glucagon (human recombinant), 1 mg, Intramuscular, PRN    glucose, 16 g, Oral, PRN    glucose, 24 g, Oral, PRN    HYDROmorphone, 0.5 mg, Intravenous, Q3H PRN    naloxone, 0.02 mg, Intravenous, PRN    oxyCODONE, 15 mg, Oral, Q4H PRN    sodium chloride 0.9%, 10 mL, Intravenous, Q12H PRN     Review of patient's allergies indicates:  No Known Allergies  Objective:     Vital Signs (Most Recent):  Temp: 97.4 °F (36.3 °C) (12/16/24 0749)  Pulse: 67 (12/16/24 0749)  Resp: 17 (12/16/24  0749)  BP: 104/68 (12/16/24 0749)  SpO2: 96 % (12/16/24 0749) Vital Signs (24h Range):  Temp:  [97.4 °F (36.3 °C)-98.9 °F (37.2 °C)] 97.4 °F (36.3 °C)  Pulse:  [54-82] 67  Resp:  [17-19] 17  SpO2:  [95 %-99 %] 96 %  BP: ()/(54-69) 104/68     Intake/Output - Last 3 Shifts         12/14 0700  12/15 0659 12/15 0700  12/16 0659 12/16 0700  12/17 0659    P.O. 1000      Total Intake(mL/kg) 1000 (17.5)      Urine (mL/kg/hr) 0 (0)      Stool 0      Chest Tube 100 30     Total Output 100 30     Net +900 -30            Urine Occurrence 1 x      Stool Occurrence 0 x              SpO2: 96 %        Physical Exam  Vitals reviewed.   Constitutional:       General: She is not in acute distress.     Appearance: She is not toxic-appearing.   HENT:      Head: Normocephalic and atraumatic.   Eyes:      Extraocular Movements: Extraocular movements intact.   Cardiovascular:      Rate and Rhythm: Normal rate.   Pulmonary:      Effort: Pulmonary effort is normal. No respiratory distress.      Comments: CT to suction with air leak  Skin:     General: Skin is warm and dry.   Neurological:      General: No focal deficit present.      Mental Status: She is alert.            Significant Labs:  All pertinent labs from the last 24 hours have been reviewed.    Significant Diagnostics:  I have reviewed and interpreted all pertinent imaging results/findings within the past 24 hours.    VTE Risk Mitigation (From admission, onward)           Ordered     enoxaparin injection 40 mg  Daily         12/07/24 1542     IP VTE LOW RISK PATIENT  Once         12/07/24 1542     Place sequential compression device  Until discontinued         12/07/24 1542                  Assessment/Plan:     Shaunoleiomyomatosis  Ann-Marie Saavedra is a 44 y.o. female with recently diagnosed lymphangioleiomyomatosis c/b pneumothorax who was admitted following chest tube placement by ED staff on 12/7/24. Thoracic surgery was consulted for evaluation of ongoing air  leak.    CT placed to WS this morning. AM CXR performed approximately 1.5 hours later with interval increase in apical pneumothorax.   Will obtain additional CXR at 11AM. If stable will place Heimlich valve, if worsening will replace to suction and plan for bleomycin pleurodesis at bedside  Hold sirliomus for inflammatory reaction needed for pleurodesis and wound healing for two weeks following OR   MMPC, Avoid NSAIDS following pleurodesis.   Daily CXR.     Thoracic surgery to continue to follow          Chad Diggs MD  Thoracic Surgery  [facility]

## 2024-12-16 NOTE — PLAN OF CARE
[facility] Medicine Telemetry  Discharge Reassessment    Primary Care Provider: Elsi Klein MD    Expected Discharge Date: 12/18/2024    Reassessment (most recent)       Discharge Reassessment - 12/16/24 1642          Discharge Reassessment    Assessment Type Discharge Planning Reassessment (P)      Did the patient's condition or plan change since previous assessment? No (P)      Discharge Plan discussed with: Patient (P)      Communicated DAVID with patient/caregiver Yes (P)      Discharge Plan A Home with family (P)      Discharge Plan B Home (P)      DME Needed Upon Discharge  none (P)      Transition of Care Barriers None (P)      Why the patient remains in the hospital Requires continued medical care (P)         Post-Acute Status    Post-Acute Authorization Other (P)      Coverage BLUE CROSS BLUE SHIELD [facility] (P)      Other Status Awaiting f/u Appts (P)                  Discharge Plan A and Plan B have been determined by review of patient's clinical status, future medical and therapeutic needs, and coverage/benefits for post-acute care in coordination with multidisciplinary team members.                     Rusty Herndon, MSW, LMSW  [facility]  Case Management  Ext. [phone]

## 2024-12-16 NOTE — SUBJECTIVE & OBJECTIVE
Interval History: No acute events overnight. Patient has no acute complaints. Denies chest pain, SOB, n/v, c/d. Pain is well managed on current regimen.       Review of Systems  Objective:     Vital Signs (Most Recent):  Temp: 97.4 °F (36.3 °C) (12/16/24 0749)  Pulse: 67 (12/16/24 0749)  Resp: 16 (12/16/24 1027)  BP: 104/68 (12/16/24 0749)  SpO2: 96 % (12/16/24 0749) Vital Signs (24h Range):  Temp:  [97.4 °F (36.3 °C)-98.9 °F (37.2 °C)] 97.4 °F (36.3 °C)  Pulse:  [54-82] 67  Resp:  [16-19] 16  SpO2:  [95 %-99 %] 96 %  BP: ()/(54-69) 104/68     Weight: 57 kg (125 lb 10.6 oz)  Body mass index is 20.28 kg/m².    Intake/Output Summary (Last 24 hours) at 12/16/2024 1113  Last data filed at 12/15/2024 1607  Gross per 24 hour   Intake --   Output 30 ml   Net -30 ml      Physical Exam  Vitals and nursing note reviewed.   Constitutional:       General: She is not in acute distress.     Appearance: Normal appearance. She is not ill-appearing or toxic-appearing.   HENT:      Head: Normocephalic.   Eyes:      General: No scleral icterus.  Cardiovascular:      Rate and Rhythm: Normal rate and regular rhythm.      Heart sounds: Normal heart sounds. No murmur heard.  Pulmonary:      Effort: No respiratory distress.      Breath sounds: Examination of the right-lower field reveals decreased breath sounds. Decreased breath sounds present. No wheezing or rales.   Abdominal:      General: Abdomen is flat.      Palpations: Abdomen is soft.   Musculoskeletal:      Right lower leg: No edema.      Left lower leg: No edema.   Skin:     General: Skin is warm and dry.   Neurological:      General: No focal deficit present.      Mental Status: She is alert and oriented to person, place, and time.   Psychiatric:         Mood and Affect: Mood normal.         Behavior: Behavior normal.

## 2024-12-17 PROCEDURE — 11000001 HC ACUTE MED/SURG PRIVATE ROOM: Mod: NTX

## 2024-12-17 PROCEDURE — 25000003 PHARM REV CODE 250: Mod: NTX | Performed by: STUDENT IN AN ORGANIZED HEALTH CARE EDUCATION/TRAINING PROGRAM

## 2024-12-17 PROCEDURE — 63600175 PHARM REV CODE 636 W HCPCS: Mod: NTX | Performed by: STUDENT IN AN ORGANIZED HEALTH CARE EDUCATION/TRAINING PROGRAM

## 2024-12-17 PROCEDURE — 25000003 PHARM REV CODE 250: Mod: NTX | Performed by: INTERNAL MEDICINE

## 2024-12-17 RX ADMIN — POLYETHYLENE GLYCOL 3350 17 G: 17 POWDER, FOR SOLUTION ORAL at 10:12

## 2024-12-17 RX ADMIN — POLYETHYLENE GLYCOL 3350 17 G: 17 POWDER, FOR SOLUTION ORAL at 09:12

## 2024-12-17 RX ADMIN — ACETAMINOPHEN 1000 MG: 500 TABLET ORAL at 10:12

## 2024-12-17 RX ADMIN — OXYCODONE HYDROCHLORIDE 15 MG: 10 TABLET ORAL at 06:12

## 2024-12-17 RX ADMIN — LIDOCAINE 1 PATCH: 50 PATCH CUTANEOUS at 01:12

## 2024-12-17 RX ADMIN — DOXYCYCLINE 50 MG: 50 CAPSULE ORAL at 09:12

## 2024-12-17 RX ADMIN — OXYCODONE HYDROCHLORIDE 15 MG: 10 TABLET ORAL at 12:12

## 2024-12-17 RX ADMIN — GABAPENTIN 300 MG: 300 CAPSULE ORAL at 09:12

## 2024-12-17 RX ADMIN — ENOXAPARIN SODIUM 40 MG: 40 INJECTION SUBCUTANEOUS at 05:12

## 2024-12-17 RX ADMIN — ACETAMINOPHEN 1000 MG: 500 TABLET ORAL at 01:12

## 2024-12-17 RX ADMIN — SENNOSIDES AND DOCUSATE SODIUM 2 TABLET: 50; 8.6 TABLET ORAL at 09:12

## 2024-12-17 RX ADMIN — DOXYCYCLINE 50 MG: 50 CAPSULE ORAL at 10:12

## 2024-12-17 RX ADMIN — SENNOSIDES AND DOCUSATE SODIUM 2 TABLET: 50; 8.6 TABLET ORAL at 10:12

## 2024-12-17 NOTE — SUBJECTIVE & OBJECTIVE
Interval History: NAEO, remains on room air - 2L NC. No air leak.      Medications:  Continuous Infusions:  Scheduled Meds:   acetaminophen  1,000 mg Oral Q8H    doxycycline  50 mg Oral Q12H    enoxparin  40 mg Subcutaneous Daily    gabapentin  300 mg Oral QHS    LIDOcaine  1 patch Transdermal Q24H    polyethylene glycol  17 g Oral BID    senna-docusate 8.6-50 mg  2 tablet Oral BID     PRN Meds:  Current Facility-Administered Medications:     albuterol, 2 puff, Inhalation, Q6H PRN    dextrose 10%, 12.5 g, Intravenous, PRN    dextrose 10%, 25 g, Intravenous, PRN    glucagon (human recombinant), 1 mg, Intramuscular, PRN    glucose, 16 g, Oral, PRN    glucose, 24 g, Oral, PRN    HYDROmorphone, 0.5 mg, Intravenous, Q3H PRN    naloxone, 0.02 mg, Intravenous, PRN    oxyCODONE, 15 mg, Oral, Q4H PRN    sodium chloride 0.9%, 10 mL, Intravenous, Q12H PRN     Objective:     Vital Signs (Most Recent):  Temp: 98.2 °F (36.8 °C) (12/17/24 0720)  Pulse: 66 (12/17/24 0720)  Resp: 17 (12/17/24 0720)  BP: 131/85 (12/17/24 0720)  SpO2: (!) 91 % (12/17/24 0720) Vital Signs (24h Range):  Temp:  [97.6 °F (36.4 °C)-98.5 °F (36.9 °C)] 98.2 °F (36.8 °C)  Pulse:  [56-75] 66  Resp:  [16-18] 17  SpO2:  [88 %-97 %] 91 %  BP: ()/(54-85) 131/85     Weight: 57 kg (125 lb 10.6 oz)  Body mass index is 20.28 kg/m².    SpO2: (!) 91 %       Intake/Output - Last 3 Shifts         12/15 0700  12/16 0659 12/16 0700 12/17 0659 12/17 0700 12/18 0659    P.O.  360     Total Intake(mL/kg)  360 (6.3)     Urine (mL/kg/hr)       Stool       Chest Tube 30      Total Output 30      Net -30 +360                    Lines/Drains/Airways       Drain  Duration                  Chest Tube 12/11/24 1505 Tube - 1 Right Pleural 28 Fr. 5 days              Peripheral Intravenous Line  Duration                  Peripheral IV - Single Lumen 12/11/24 1404 18 G 1 in Left Forearm 5 days                     Physical Exam  Vitals reviewed.   Constitutional:       General: She  is not in acute distress.     Appearance: She is not toxic-appearing.   Cardiovascular:      Rate and Rhythm: Normal rate.   Pulmonary:      Effort: Pulmonary effort is normal. No respiratory distress.      Comments: CT to WS  Skin:     General: Skin is warm and dry.   Neurological:      General: No focal deficit present.      Mental Status: She is alert.            Significant Labs:  All pertinent labs from the last 24 hours have been reviewed.    Significant Diagnostics:  CXR: I have reviewed all pertinent results/findings within the past 24 hours

## 2024-12-17 NOTE — ASSESSMENT & PLAN NOTE
Recent diagnosis. Referred as outpatient to advanced lung disease and lung transplant.  - sirolimus 1 mg daily - held for pneumothorax healing  - Pulm following

## 2024-12-17 NOTE — ASSESSMENT & PLAN NOTE
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 S/p chest tube placement in ED. Initially, pulmonology was consulted and managing CT. PTX improved but not resolved and with persistent air leak. CT surgery consulted for assistance in management. Lung transplant consulted  Patient to underwent doxycycline VATS 11/11 - improved pneumothorax as well as SOB  - Hold sirolimus to allow for inflammatory response of pleurodesis  - daily CXR  - extended clamp trial overnight

## 2024-12-17 NOTE — PROGRESS NOTES
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       Of note, she has been having worsening shortness of breath for years, intermittently with chest pain that has limited her work. Mostly SOB with  activity that has limited her tennis/running and daily work. 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 She is currently being referred to advanced lung disease and lung transplant.     Seen by pulmonology in ED, chest tube placed in ED. Chest xray after chest tube placement shows adequate placement with interval decrease in pneumothorax without complete resolution. Being admitted to  for workup and management of PTX and hyperkalemia.     Overview/Hospital Course:  Patient had chest tube placed in ER. Pulmonary, transplant lung and thoracic surgery consulted. Still with air leak. So patient underwent VATS pleurodesis with doxycycline 12/11 and upsizing of chest tube. Holding sirolimus to allow inflammatory reaction to seal the PTX. Still with Chest tube. CTS would like to do an extended clamp overnight to see how she does. Goal DC in am.     Interval History: No acute events overnight. Patient doing well. She is eager to dc.       Review of Systems  Objective:     Vital Signs (Most Recent):  Temp: 98 °F (36.7 °C) (12/17/24 1143)  Pulse: 80 (12/17/24 1143)  Resp: 16 (12/17/24 1218)  BP: 110/67 (12/17/24 1143)  SpO2: (!) 93 %  (12/17/24 1143) Vital Signs (24h Range):  Temp:  [97.6 °F (36.4 °C)-98.5 °F (36.9 °C)] 98 °F (36.7 °C)  Pulse:  [56-80] 80  Resp:  [16-18] 16  SpO2:  [88 %-97 %] 93 %  BP: ()/(54-85) 110/67     Weight: 57 kg (125 lb 10.6 oz)  Body mass index is 20.28 kg/m².    Intake/Output Summary (Last 24 hours) at 12/17/2024 1428  Last data filed at 12/17/2024 0656  Gross per 24 hour   Intake 360 ml   Output --   Net 360 ml      Physical Exam  Vitals and nursing note reviewed.   Constitutional:       General: She is not in acute distress.     Appearance: Normal appearance. She is not ill-appearing or toxic-appearing.   HENT:      Head: Normocephalic.   Eyes:      General: No scleral icterus.  Cardiovascular:      Rate and Rhythm: Normal rate and regular rhythm.      Heart sounds: Normal heart sounds. No murmur heard.  Pulmonary:      Effort: No respiratory distress.      Breath sounds: Examination of the right-lower field reveals decreased breath sounds. Decreased breath sounds present. No wheezing or rales.   Abdominal:      General: Abdomen is flat.      Palpations: Abdomen is soft.   Musculoskeletal:      Right lower leg: No edema.      Left lower leg: No edema.   Skin:     General: Skin is warm and dry.   Neurological:      General: No focal deficit present.      Mental Status: She is alert and oriented to person, place, and time.   Psychiatric:         Mood and Affect: Mood normal.         Behavior: Behavior normal.         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 S/p chest tube placement in ED. Initially, pulmonology was consulted and managing CT. PTX improved but not resolved and with persistent air leak. CT surgery consulted for assistance in management. Lung transplant consulted  Patient to underwent doxycycline VATS 11/11 - improved pneumothorax as well as SOB  - Hold sirolimus to allow for inflammatory response of pleurodesis  -  daily CXR  - extended clamp trial overnight    Lymphangioleiomyomatosis  Recent diagnosis. Referred as outpatient to advanced lung disease and lung transplant.  - sirolimus 1 mg daily - held for pneumothorax healing  - Pulm following    Opioid-induced constipation  Senokot ii po BID and miralax 17 po BID for now      Post-op pain  Pain control: Oxycodone 15 q 4 PRN and dilaudid 0.5 mg q 3 PRN  Acetaminophen 1 g TID  Gabapentin 300 mg qhs  Lidocaine patch to area of pain  Ultimately can  de-ecalate to low dose opioid therapy with some lidocaine patches at discharge  NO NSAIDS (to allow for inflammatory reaction to pleurodesis)    Hyperkalemia  Resolved    Rosacea  C/w home doxycycline daily        VTE Risk Mitigation (From admission, onward)           Ordered     enoxaparin injection 40 mg  Daily         12/07/24 1542     IP VTE LOW RISK PATIENT  Once         12/07/24 1542     Place sequential compression device  Until discontinued         12/07/24 1542                    Discharge Planning   DAVID: 12/18/2024     Code Status: Full Code   Medical Readiness for Discharge Date:   Discharge Plan A: Home with family                        Suellen Lipscomb MD  Department of Hospital Medicine   [facility] Internal Medicine Telemetry

## 2024-12-17 NOTE — PROGRESS NOTES
[facility] Medicine Telemetry  Cardiothoracic Surgery  Progress Note    Patient Name: Ann-Marie Saavedra  MRN: [id number]  Admission Date: 12/7/2024  Hospital Length of Stay: 9 days  Code Status: Full Code   Attending Physician: Suellen Lipscomb MD   Referring Provider: Self, Aaareferral  Principal Problem:Secondary spontaneous pneumothorax      Subjective:     Post-Op Info:  Procedure(s) (LRB):  VATS, WITH PLEURODESIS (Right)  BLOCK, NERVE, INTERCOSTAL, 2 OR MORE (Right)   6 Days Post-Op     Interval History: NAEO, remains on room air - 2L NC. No air leak.      Medications:  Continuous Infusions:  Scheduled Meds:   acetaminophen  1,000 mg Oral Q8H    doxycycline  50 mg Oral Q12H    enoxparin  40 mg Subcutaneous Daily    gabapentin  300 mg Oral QHS    LIDOcaine  1 patch Transdermal Q24H    polyethylene glycol  17 g Oral BID    senna-docusate 8.6-50 mg  2 tablet Oral BID     PRN Meds:  Current Facility-Administered Medications:     albuterol, 2 puff, Inhalation, Q6H PRN    dextrose 10%, 12.5 g, Intravenous, PRN    dextrose 10%, 25 g, Intravenous, PRN    glucagon (human recombinant), 1 mg, Intramuscular, PRN    glucose, 16 g, Oral, PRN    glucose, 24 g, Oral, PRN    HYDROmorphone, 0.5 mg, Intravenous, Q3H PRN    naloxone, 0.02 mg, Intravenous, PRN    oxyCODONE, 15 mg, Oral, Q4H PRN    sodium chloride 0.9%, 10 mL, Intravenous, Q12H PRN     Objective:     Vital Signs (Most Recent):  Temp: 98.2 °F (36.8 °C) (12/17/24 0720)  Pulse: 66 (12/17/24 0720)  Resp: 17 (12/17/24 0720)  BP: 131/85 (12/17/24 0720)  SpO2: (!) 91 % (12/17/24 0720) Vital Signs (24h Range):  Temp:  [97.6 °F (36.4 °C)-98.5 °F (36.9 °C)] 98.2 °F (36.8 °C)  Pulse:  [56-75] 66  Resp:  [16-18] 17  SpO2:  [88 %-97 %] 91 %  BP: ()/(54-85) 131/85     Weight: 57 kg (125 lb 10.6 oz)  Body mass index is 20.28 kg/m².    SpO2: (!) 91 %       Intake/Output - Last 3 Shifts         12/15 0700  12/16 0659 12/16 0700  12/17 0659 12/17 0700  12/18 0659     P.O.  360     Total Intake(mL/kg)  360 (6.3)     Urine (mL/kg/hr)       Stool       Chest Tube 30      Total Output 30      Net -30 +360                    Lines/Drains/Airways       Drain  Duration                  Chest Tube 12/11/24 1505 Tube - 1 Right Pleural 28 Fr. 5 days              Peripheral Intravenous Line  Duration                  Peripheral IV - Single Lumen 12/11/24 1404 18 G 1 in Left Forearm 5 days                     Physical Exam  Vitals reviewed.   Constitutional:       General: She is not in acute distress.     Appearance: She is not toxic-appearing.   Cardiovascular:      Rate and Rhythm: Normal rate.   Pulmonary:      Effort: Pulmonary effort is normal. No respiratory distress.      Comments: CT to WS  Skin:     General: Skin is warm and dry.   Neurological:      General: No focal deficit present.      Mental Status: She is alert.            Significant Labs:  All pertinent labs from the last 24 hours have been reviewed.    Significant Diagnostics:  CXR: I have reviewed all pertinent results/findings within the past 24 hours  Assessment/Plan:     * Secondary spontaneous pneumothorax  Ann-Marie Saavedra is a 44 y.o. female with recently diagnosed lymphangioleiomyomatosis c/b pneumothorax who was admitted following chest tube placement by ED staff on 12/7/24. Thoracic surgery was consulted for evaluation of ongoing air leak.     Chest tube clamped this morning  Interval CXR at 11:00 am, if stable will continue clamp trial overnight  Hold sirliomus for inflammatory reaction needed for pleurodesis and wound healing for two weeks following OR   Daily CXR.   Thoracic surgery will continue to follow        Bienvenido Szymanski DO  Cardiothoracic Surgery  [facility] - [facility] Medicine Tele[facility]

## 2024-12-17 NOTE — PROGRESS NOTES
"[facility] - Internal Medicine Telemetry  Adult Nutrition  Progress Note    SUMMARY       Recommendations   1. Continue Regular diet   2. Add Boost plus if po intake continues <50% meal consumption   3. RD following    Goals: Meet % of EEN/EPN by RD f/u date  Nutrition Goal Status: goal not met  Communication of RD Recs: POC    Assessment and Plan    Nutrition Problem  Inadequate (sub optimal) energy intake     Related to (etiology):   Poor intake     Signs and Symptoms (as evidenced by):   Energy intake < needs (25-50% meal consumption)    Interventions (treatment strategy):  Collaboration of nutrition care w/ other providers     Nutrition Diagnosis Status:   New      Reason for Assessment    Reason For Assessment: length of stay (LOS day 9)  Diagnosis: other (see comments)  General Information Comments: LOS day 9. 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 Pt consuming 25-50% of meals provided. Pt denies any issues chewing/swallowing at this time. NFPE completed, pt is at risk for malnutrition if po intake declines <50% meal consumption. RD following.  Nutrition Discharge Planning: adequate nutritional intake  Nutrition Related Social Determinants of Health: SDOH: Unable to assess at this time.       Nutrition Risk Screen    Nutrition Risk Screen: no indicators present    Nutrition/Diet History    Spiritual, Cultural Beliefs, Catholic Practices, Values that Affect Care: no  Food Allergies: NKFA    Anthropometrics    Temp: 98 °F (36.7 °C)  Height Method: Stated  Height: 5' 6" (167.6 cm)  Height (inches): 66 in  Weight Method: Bed Scale  Weight: 57 kg (125 lb 10.6 oz)  Weight (lb): 125.66 lb  Ideal Body Weight (IBW), Female: 130 lb  % Ideal Body Weight, Female (lb): 96.66 %  BMI (Calculated): 20.3  BMI Grade: 18.5-24.9 - normal       Lab/Procedures/Meds    Pertinent Labs Reviewed: reviewed  Pertinent Labs Comments: " -  Pertinent Medications Reviewed: reviewed  Pertinent Medications Comments: senna docusajames    Estimated/Assessed Needs    Weight Used For Calorie Calculations: 57 kg (125 lb 10.6 oz)  Energy Calorie Requirements (kcal): 8780-6461  Energy Need Method: Kcal/kg (25-30 kcal/kg)  Protein Requirements: 57-68 g (1.0-1.2 g/kg)  Weight Used For Protein Calculations: 57 kg (125 lb 10.6 oz)  RDA Method (mL): 1425       Nutrition Prescription Ordered    Current Diet Order: Regular diet    Evaluation of Received Nutrient/Fluid Intake    I/O: -  Energy Calories Required: not meeting needs  Protein Required: not meeting needs  Fluid Required: not meeting needs  Total Fluid Intake (mL/kg): 1 ml or fluid per MD  Tolerance: tolerating  % Intake of Estimated Energy Needs: 25-50%  % Meal Intake: 25-50%    Nutrition Risk    Level of Risk/Frequency of Follow-up: low ((1x/week))     Monitor and Evaluation    Food and Nutrient Intake: energy intake, food and beverage intake  Food and Nutrient Adminstration: diet order  Knowledge/Beliefs/Attitudes: food and nutrition knowledge/skill, beliefs and attitudes  Physical Activity and Function: nutrition-related ADLs and IADLs, factors affecting access to physical activity  Anthropometric Measurements: height/length, weight, weight change, body mass index, growth pattern indices/percentile ranks  Biochemical Data, Medical Tests and Procedures: gastrointestinal profile, electrolyte and renal panel, glucose/endocrine profile, inflammatory profile, lipid profile  Nutrition-Focused Physical Findings: overall appearance, extremities, muscles and bones, skin, head and eyes     Nutrition Follow-Up    RD Follow-up?: Yes

## 2024-12-17 NOTE — PLAN OF CARE
Recommendations   1. Continue Regular diet   2. Add Boost plus if po intake continues <50% meal consumption   3. RD following     Goals: Meet % of EEN/EPN by RD f/u date  Nutrition Goal Status: goal not met  Communication of RD Recs: POC

## 2024-12-17 NOTE — ASSESSMENT & PLAN NOTE
Ann-Marie Saavedra is a 44 y.o. female with recently diagnosed lymphangioleiomyomatosis c/b pneumothorax who was admitted following chest tube placement by ED staff on 12/7/24. Thoracic surgery was consulted for evaluation of ongoing air leak.     Chest tube clamped this morning  Interval CXR at 11:00 am, if stable will continue clamp trial overnight  Hold sirliomus for inflammatory reaction needed for pleurodesis and wound healing for two weeks following OR   Daily CXR.   Thoracic surgery will continue to follow

## 2024-12-17 NOTE — SUBJECTIVE & OBJECTIVE
Interval History: No acute events overnight. Patient doing well. She is eager to dc.       Review of Systems  Objective:     Vital Signs (Most Recent):  Temp: 98 °F (36.7 °C) (12/17/24 1143)  Pulse: 80 (12/17/24 1143)  Resp: 16 (12/17/24 1218)  BP: 110/67 (12/17/24 1143)  SpO2: (!) 93 % (12/17/24 1143) Vital Signs (24h Range):  Temp:  [97.6 °F (36.4 °C)-98.5 °F (36.9 °C)] 98 °F (36.7 °C)  Pulse:  [56-80] 80  Resp:  [16-18] 16  SpO2:  [88 %-97 %] 93 %  BP: ()/(54-85) 110/67     Weight: 57 kg (125 lb 10.6 oz)  Body mass index is 20.28 kg/m².    Intake/Output Summary (Last 24 hours) at 12/17/2024 1428  Last data filed at 12/17/2024 0656  Gross per 24 hour   Intake 360 ml   Output --   Net 360 ml      Physical Exam  Vitals and nursing note reviewed.   Constitutional:       General: She is not in acute distress.     Appearance: Normal appearance. She is not ill-appearing or toxic-appearing.   HENT:      Head: Normocephalic.   Eyes:      General: No scleral icterus.  Cardiovascular:      Rate and Rhythm: Normal rate and regular rhythm.      Heart sounds: Normal heart sounds. No murmur heard.  Pulmonary:      Effort: No respiratory distress.      Breath sounds: Examination of the right-lower field reveals decreased breath sounds. Decreased breath sounds present. No wheezing or rales.   Abdominal:      General: Abdomen is flat.      Palpations: Abdomen is soft.   Musculoskeletal:      Right lower leg: No edema.      Left lower leg: No edema.   Skin:     General: Skin is warm and dry.   Neurological:      General: No focal deficit present.      Mental Status: She is alert and oriented to person, place, and time.   Psychiatric:         Mood and Affect: Mood normal.         Behavior: Behavior normal.

## 2024-12-17 NOTE — PLAN OF CARE
Problem: Adult Inpatient Plan of Care  Goal: Plan of Care Review  Outcome: Progressing  Flowsheets (Taken 12/17/2024 0348)  Plan of Care Reviewed With: patient  Goal: Patient-Specific Goal (Individualized)  Outcome: Progressing  Goal: Absence of Hospital-Acquired Illness or Injury  Outcome: Progressing  Intervention: Prevent Skin Injury  Flowsheets (Taken 12/17/2024 0348)  Device Skin Pressure Protection: adhesive use limited  Intervention: Prevent and Manage VTE (Venous Thromboembolism) Risk  Flowsheets (Taken 12/17/2024 0348)  VTE Prevention/Management:   bleeding precautions maintained   bleeding risk assessed  Intervention: Prevent Infection  Flowsheets (Taken 12/17/2024 0348)  Infection Prevention: hand hygiene promoted  Goal: Optimal Comfort and Wellbeing  Outcome: Progressing  Intervention: Monitor Pain and Promote Comfort  Flowsheets (Taken 12/17/2024 0348)  Pain Management Interventions:   care clustered   pain management plan reviewed with patient/caregiver   position adjusted  Goal: Readiness for Transition of Care  Outcome: Progressing     Problem: Respiratory Compromise (Pneumothorax)  Goal: Optimal Oxygenation and Ventilation  Outcome: Progressing     Problem: Wound  Goal: Optimal Coping  Outcome: Progressing  Goal: Optimal Functional Ability  Outcome: Progressing  Goal: Absence of Infection Signs and Symptoms  Outcome: Progressing  Goal: Improved Oral Intake  Outcome: Progressing  Intervention: Promote and Optimize Oral Intake  Flowsheets (Taken 12/17/2024 0348)  Nutrition Interventions: meal set-up provided  Goal: Optimal Pain Control and Function  Outcome: Progressing  Intervention: Prevent or Manage Pain  Flowsheets (Taken 12/17/2024 0348)  Sleep/Rest Enhancement:   awakenings minimized   regular sleep/rest pattern promoted  Pain Management Interventions:   care clustered   pain management plan reviewed with patient/caregiver   position adjusted  Goal: Skin Health and Integrity  Outcome:  Progressing  Intervention: Optimize Skin Protection  Flowsheets (Taken 12/17/2024 0348)  Pressure Reduction Techniques: frequent weight shift encouraged  Goal: Optimal Wound Healing  Outcome: Progressing  Intervention: Promote Wound Healing  Flowsheets (Taken 12/17/2024 0348)  Sleep/Rest Enhancement:   awakenings minimized   regular sleep/rest pattern promoted     Problem: Skin Injury Risk Increased  Goal: Skin Health and Integrity  Outcome: Progressing  Intervention: Optimize Skin Protection  Flowsheets (Taken 12/17/2024 0348)  Pressure Reduction Techniques: frequent weight shift encouraged  Intervention: Promote and Optimize Oral Intake  Flowsheets (Taken 12/17/2024 0348)  Nutrition Interventions: meal set-up provided     Problem: Fall Injury Risk  Goal: Absence of Fall and Fall-Related Injury  Outcome: Progressing     Problem: Pain Acute  Goal: Optimal Pain Control and Function  Outcome: Progressing  Intervention: Develop Pain Management Plan  Flowsheets (Taken 12/17/2024 0348)  Pain Management Interventions:   care clustered   pain management plan reviewed with patient/caregiver   position adjusted  Intervention: Prevent or Manage Pain  Flowsheets (Taken 12/17/2024 0348)  Sleep/Rest Enhancement:   awakenings minimized   regular sleep/rest pattern promoted

## 2024-12-18 VITALS
HEIGHT: 66 IN | WEIGHT: 125.69 LBS | OXYGEN SATURATION: 97 % | SYSTOLIC BLOOD PRESSURE: 108 MMHG | HEART RATE: 65 BPM | RESPIRATION RATE: 17 BRPM | DIASTOLIC BLOOD PRESSURE: 66 MMHG | TEMPERATURE: 98 F | BODY MASS INDEX: 20.2 KG/M2

## 2024-12-18 DIAGNOSIS — J93.12 SECONDARY SPONTANEOUS PNEUMOTHORAX: Primary | ICD-10-CM

## 2024-12-18 PROCEDURE — 25000003 PHARM REV CODE 250: Mod: NTX | Performed by: STUDENT IN AN ORGANIZED HEALTH CARE EDUCATION/TRAINING PROGRAM

## 2024-12-18 PROCEDURE — 25000003 PHARM REV CODE 250: Mod: NTX | Performed by: INTERNAL MEDICINE

## 2024-12-18 RX ORDER — LIDOCAINE 50 MG/G
1 PATCH TOPICAL DAILY
Qty: 14 PATCH | Refills: 0 | Status: SHIPPED | OUTPATIENT
Start: 2024-12-18 | End: 2025-01-01

## 2024-12-18 RX ORDER — POLYETHYLENE GLYCOL 3350 17 G/17G
17 POWDER, FOR SOLUTION ORAL 2 TIMES DAILY
Qty: 238 G | Refills: 0 | Status: SHIPPED | OUTPATIENT
Start: 2024-12-18 | End: 2024-12-25

## 2024-12-18 RX ORDER — ACETAMINOPHEN 500 MG
1000 TABLET ORAL EVERY 8 HOURS
Qty: 84 TABLET | Refills: 0 | Status: SHIPPED | OUTPATIENT
Start: 2024-12-18 | End: 2025-01-01

## 2024-12-18 RX ORDER — OXYCODONE HYDROCHLORIDE 15 MG/1
15 TABLET ORAL EVERY 4 HOURS PRN
Qty: 18 TABLET | Refills: 0 | Status: SHIPPED | OUTPATIENT
Start: 2024-12-18 | End: 2024-12-21

## 2024-12-18 RX ORDER — GABAPENTIN 300 MG/1
300 CAPSULE ORAL NIGHTLY
Qty: 30 CAPSULE | Refills: 11 | Status: SHIPPED | OUTPATIENT
Start: 2024-12-18 | End: 2025-12-18

## 2024-12-18 RX ADMIN — ACETAMINOPHEN 1000 MG: 500 TABLET ORAL at 06:12

## 2024-12-18 RX ADMIN — DOXYCYCLINE 50 MG: 50 CAPSULE ORAL at 09:12

## 2024-12-18 RX ADMIN — SENNOSIDES AND DOCUSATE SODIUM 2 TABLET: 50; 8.6 TABLET ORAL at 09:12

## 2024-12-18 NOTE — NURSING
At 1200 IV Dc'd,  Discontinued chest tube at 11:00. Receiving Dc information from tele nurse and AVS provided.

## 2024-12-18 NOTE — PLAN OF CARE
Problem: Adult Inpatient Plan of Care  Goal: Plan of Care Review  Outcome: Adequate for Care Transition  Goal: Patient-Specific Goal (Individualized)  Outcome: Adequate for Care Transition  Goal: Absence of Hospital-Acquired Illness or Injury  Outcome: Adequate for Care Transition  Goal: Optimal Comfort and Wellbeing  Outcome: Adequate for Care Transition  Goal: Readiness for Transition of Care  Outcome: Adequate for Care Transition     Problem: Respiratory Compromise (Pneumothorax)  Goal: Optimal Oxygenation and Ventilation  Outcome: Adequate for Care Transition     Problem: Wound  Goal: Optimal Coping  Outcome: Adequate for Care Transition  Goal: Optimal Functional Ability  Outcome: Adequate for Care Transition  Goal: Absence of Infection Signs and Symptoms  Outcome: Adequate for Care Transition  Goal: Improved Oral Intake  Outcome: Adequate for Care Transition  Goal: Optimal Pain Control and Function  Outcome: Adequate for Care Transition  Goal: Skin Health and Integrity  Outcome: Adequate for Care Transition  Goal: Optimal Wound Healing  Outcome: Adequate for Care Transition     Problem: Skin Injury Risk Increased  Goal: Skin Health and Integrity  Outcome: Adequate for Care Transition     Problem: Fall Injury Risk  Goal: Absence of Fall and Fall-Related Injury  Outcome: Adequate for Care Transition     Problem: Pain Acute  Goal: Optimal Pain Control and Function  Outcome: Adequate for Care Transition

## 2024-12-18 NOTE — CARE UPDATE
"RAPID RESPONSE NURSE CHART REVIEW        Chart Reviewed: 12/17/24 2300    MRN: [id number]  Bed: 1157/1157 A    Dx: Secondary spontaneous pneumothorax    Ann-Marie Saavedra has a past medical history of Abnormal Pap smear, Keloid cicatrix, and Wound dehiscence in puerperium, perineal, delivered/postpartum.    Last VS: /64 (BP Location: Right arm, Patient Position: Lying)   Pulse 62   Temp 98.6 °F (37 °C) (Oral)   Resp 18   Ht 5' 6" (1.676 m)   Wt 57 kg (125 lb 10.6 oz)   SpO2 (!) 93%   Breastfeeding No   BMI 20.28 kg/m²     24H Vital Sign Range:  Temp:  [97.9 °F (36.6 °C)-98.6 °F (37 °C)]   Pulse:  [62-80]   Resp:  [16-18]   BP: (101-131)/(62-85)   SpO2:  [91 %-94 %]     Level of Consciousness (AVPU): alert    No results for input(s): "CBC", "WBC", "HGB", "HCT", "PLT" in the last 72 hours.    No results for input(s): "NA", "K", "CL", "CO2", "BUN", "CREATININE", "GLU", "PHOS", "MG" in the last 72 hours.    Invalid input(s): "CMP", "TBIL"     No results for input(s): "PH", "PCO2", "PO2", "HCO3", "POCSATURATED", "BE" in the last 72 hours.     OXYGEN:  Flow (L/min) (Oxygen Therapy): 2          MEWS score: 1    Rounding completed with charge ALFRED Chavez reports pt's chest tube remains clamped and pt is tolerating well.Reports pt is on 2L via NC. No additional concerns verbalized at this time. Instructed to call [phone] for further concerns or assistance.    Celsa Boucher RN       "

## 2024-12-18 NOTE — DISCHARGE SUMMARY
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       Of note, she has been having worsening shortness of breath for years, intermittently with chest pain that has limited her work. Mostly SOB with  activity that has limited her tennis/running and daily work. 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 She is currently being referred to advanced lung disease and lung transplant.     Seen by pulmonology in ED, chest tube placed in ED. Chest xray after chest tube placement shows adequate placement with interval decrease in pneumothorax without complete resolution. Being admitted to  for workup and management of PTX and hyperkalemia.     Procedure(s) (LRB):  VATS, WITH PLEURODESIS (Right)  BLOCK, NERVE, INTERCOSTAL, 2 OR MORE (Right)      Hospital Course:   Patient had chest tube placed in ER. Pulmonary, transplant lung and thoracic surgery consulted. Still with air leak. So patient underwent VATS pleurodesis with doxycycline 12/11 and upsizing of chest tube. Holding sirolimus to allow inflammatory reaction to seal the PTX. Chest tube removed morning 12/18. Plan for repeat CXR on 12/30. Patient instructed to hold sirolimus until 12/25 which would 14 days after the procedure.     Patient  seen and examined on day of discharge and deemed appropriate for discharge. Plan discussed with patient, who was agreeable and amenable. Medications were discussed and reviewed, outpatient follow-up scheduled, ER precautions were given, all questions were answered to the patient's satisfaction, and patient was subsequently discharged.      Goals of Care Treatment Preferences:  Code Status: Full Code      SDOH Screening:  The patient was screened for utility difficulties, food insecurity, transport difficulties, housing insecurity, and interpersonal safety and there were no concerns identified this admission.     Consults:   Consults (From admission, onward)          Status Ordering Provider     Inpatient consult to Lung Transplant  Once        Provider:  (Not yet assigned)    Completed LEELA CARDENAS     Inpatient consult to Cardiothoracic Surgery  Once        Provider:  (Not yet assigned)    Completed RAMAKRISHNA CADET     Inpatient consult to Pulmonology  Once        Provider:  (Not yet assigned)    Completed LUCY CHIRINOS          Physical Exam  Vitals and nursing note reviewed.   Constitutional:       General: She is not in acute distress.     Appearance: Normal appearance. She is not ill-appearing or toxic-appearing.   HENT:      Head: Normocephalic.   Eyes:      General: No scleral icterus.  Cardiovascular:      Rate and Rhythm: Normal rate and regular rhythm.      Heart sounds: Normal heart sounds. No murmur heard.  Pulmonary:      Effort: No respiratory distress.      Breath sounds: normal  Abdominal:      General: Abdomen is flat.      Palpations: Abdomen is soft.   Musculoskeletal:      Right lower leg: No edema.      Left lower leg: No edema.   Skin:     General: Skin is warm and dry.   Neurological:      General: No focal deficit present.      Mental Status: She is alert and oriented to person, place, and time.   Psychiatric:         Mood and Affect: Mood normal.         Behavior: Behavior normal.     *  Secondary spontaneous pneumothorax  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 S/p chest tube placement in ED. Initially, pulmonology was consulted and managing CT. PTX improved but not resolved and with persistent air leak. CT surgery consulted for assistance in management. Lung transplant consulted  Patient to underwent doxycycline VATS 11/11 - improved pneumothorax as well as SOB  - Hold sirolimus to allow for inflammatory response of pleurodesis  - daily CXR  - extended clamp trial overnight    Lymphangioleiomyomatosis  Recent diagnosis. Referred as outpatient to advanced lung disease and lung transplant.  - sirolimus 1 mg daily - held for pneumothorax healing  - Pulm following    Opioid-induced constipation  Senokot ii po BID and miralax 17 po BID for now      Post-op pain  Pain control: Oxycodone 15 q 4 PRN and dilaudid 0.5 mg q 3 PRN  Acetaminophen 1 g TID  Gabapentin 300 mg qhs  Lidocaine patch to area of pain  Ultimately can  de-ecalate to low dose opioid therapy with some lidocaine patches at discharge  NO NSAIDS (to allow for inflammatory reaction to pleurodesis)    Hyperkalemia  Resolved    Rosacea  C/w home doxycycline daily        Final Active Diagnoses:    Diagnosis Date Noted POA    PRINCIPAL PROBLEM:  Secondary spontaneous pneumothorax [J93.12] 12/03/2024 Yes    Lymphangioleiomyomatosis [J84.81] 12/03/2024 Yes    Opioid-induced constipation [K59.03, T40.2X5A] 12/13/2024 Yes    Post-op pain [G89.18] 12/12/2024 Yes    Hyperkalemia [E87.5] 12/07/2024 Yes    Rosacea [L71.9] 11/06/2019 Yes      Problems Resolved During this Admission:       Discharged Condition: stable    Disposition:     Follow Up:    Patient Instructions:   No discharge procedures on file.    Significant Diagnostic Studies: N/A    Pending Diagnostic Studies:       None           Medications:  Reconciled Home Medications:      Medication List        START taking these medications      acetaminophen 500 MG  tablet  Commonly known as: TYLENOL  Take 2 tablets (1,000 mg total) by mouth every 8 (eight) hours. for 14 days     gabapentin 300 MG capsule  Commonly known as: NEURONTIN  Take 1 capsule (300 mg total) by mouth every evening.     LIDOcaine 5 %  Commonly known as: LIDODERM  Place 1 patch onto the skin once daily. Remove & Discard patch within 12 hours or as directed by MD for 14 days     oxyCODONE 15 MG Tab  Commonly known as: ROXICODONE  Take 1 tablet (15 mg total) by mouth every 4 (four) hours as needed for Pain.     polyethylene glycol 17 gram/dose powder  Commonly known as: GLYCOLAX  Take 17 g by mouth 2 (two) times daily. for 7 days            CHANGE how you take these medications      sirolimus 1 MG Tab  Commonly known as: RAPAMUNE  Take 1 tablet (1 mg total) by mouth once daily.            CONTINUE taking these medications      albuterol 90 mcg/actuation inhaler  Commonly known as: VENTOLIN HFA  Inhale 2 puffs into the lungs every 6 (six) hours as needed for Wheezing or Shortness of Breath. Rescue     azelastine 137 mcg (0.1 %) nasal spray  Commonly known as: ASTELIN  2 sprays (274 mcg total) by Nasal route 2 (two) times daily.     doxycycline 40 mg capsule  Commonly known as: ORACEA  Take 40 mg by mouth once daily.     levocetirizine 5 MG tablet  Commonly known as: XYZAL  Take 1 tablet (5 mg total) by mouth every evening.     multivitamin per tablet  Commonly known as: THERAGRAN  Take 1 tablet by mouth once daily.              Indwelling Lines/Drains at time of discharge:   Lines/Drains/Airways       Drain  Duration                  Chest Tube 12/11/24 1505 Tube - 1 Right Pleural 28 Fr. 6 days                    Time spent on the discharge of patient: 35 minutes         Suellen Lipscomb MD  Department of Hospital Medicine  [facility] - Internal Medicine Telemetry

## 2024-12-18 NOTE — PLAN OF CARE
[facility] Internal Medicine Telemetry  Discharge Final Note    Primary Care Provider: Elsi Klein MD    Expected Discharge Date: 12/18/2024    Final Discharge Note (most recent)       Final Note - 12/18/24 1423          Final Note    Assessment Type Final Discharge Note (P)      Anticipated Discharge Disposition Home or Self Care (P)      What phone number can be called within the next 1-3 days to see how you are doing after discharge? [phone] (P)         Post-Acute Status    Post-Acute Authorization Other (P)      Coverage [facility]      Other Status Awaiting f/u Appts (P)                    Future Appointments   Date Time Provider Department Center   12/30/2024  8:15 AM [facility] OIC-XRAY [facility] X[facility]   12/30/2024  8:45 AM Gerson Alvarado MD [facility] THORAC [facility]   1/9/2025  1:00 PM [facility]        Important Message from Medicare             Patient discharged home w/ family via personal vehicle.                KENNETH Spivey, LMSW  [facility]  Case Management  Ext. [phone]

## 2024-12-24 ENCOUNTER — PATIENT OUTREACH (OUTPATIENT)
Dept: ADMINISTRATIVE | Facility: CLINIC | Age: 44
End: 2024-12-24
Payer: COMMERCIAL

## 2024-12-24 ENCOUNTER — PATIENT MESSAGE (OUTPATIENT)
Dept: ADMINISTRATIVE | Facility: CLINIC | Age: 44
End: 2024-12-24
Payer: COMMERCIAL

## 2024-12-24 NOTE — PROGRESS NOTES
C3 nurse attempted to contact Ann-Marie Saavedra for a TCC post hospital discharge follow up call. No answer. Left voicemail with callback information. The patient has a scheduled HOSFU appointment with Elsi Klein MD on 01/09/2024 @ 1300.

## 2024-12-27 ENCOUNTER — TELEPHONE (OUTPATIENT)
Dept: TRANSPLANT | Facility: CLINIC | Age: 44
End: 2024-12-27
Payer: COMMERCIAL

## 2024-12-27 NOTE — PROGRESS NOTES
"Subjective     Patient ID: Ann-Marie Saavedra is a 44 y.o. female.    Diagnosis:  recurrent spontaneous pneumothorax    Procedure(s) and date(s): 12/11/24 - right VATS mechanical and chemical pleurodesis      Chief Complaint: Post-op Evaluation    HPI    44 y.o. female with lymphangioleiomyomatosis presents to clinic for 2 week follow up s/p right VATS pleurodesis. Tolerated procedure well. Uncomplicated post op course. Sirolimus held post-op. Restarted 12/25. Developed URI with cough and congestion shortly after discharge. Continues with cough with occasional clear sputum. No fever. She has weaned off gabapentin and pain medications. Transplant follow-up planned. Not scheduled yet.        Review of Systems   Constitutional:  Negative for activity change and fever.   Respiratory:  Positive for cough and shortness of breath.    Genitourinary:  Negative for bladder incontinence.   Neurological:  Negative for coordination difficulties.   Hematological:  Negative for adenopathy.   Psychiatric/Behavioral:  Negative for agitation. The patient is not nervous/anxious.           Objective   Vitals:    12/30/24 0852   BP: 114/77   Pulse: 72   SpO2: (!) 92%   Weight: 52.3 kg (115 lb 4.8 oz)   Height: 5' 6" (1.676 m)   PainSc: 0-No pain         Physical Exam  Constitutional:       Appearance: Normal appearance.   HENT:      Head: Atraumatic.   Eyes:      Extraocular Movements: Extraocular movements intact.   Cardiovascular:      Rate and Rhythm: Normal rate and regular rhythm.      Pulses: Normal pulses.      Heart sounds: Normal heart sounds.   Pulmonary:      Effort: Pulmonary effort is normal.      Breath sounds: Normal breath sounds. No wheezing, rhonchi or rales.   Abdominal:      General: Abdomen is flat.      Palpations: Abdomen is soft.   Musculoskeletal:         General: Normal range of motion.      Cervical back: Normal range of motion.      Right lower leg: No edema.      Left lower leg: No edema.   Skin:     " General: Skin is warm and dry.   Neurological:      General: No focal deficit present.      Mental Status: She is alert and oriented to person, place, and time.   Psychiatric:         Mood and Affect: Mood normal.         Behavior: Behavior normal.       CXR 12/30/24:   FINDINGS:  Heart size is small which can be seen with anorexia or vitamin deficiency.  There is a right costophrenic angle pneumothorax status post chest tube removal.  There is partial right lung base subsegmental atelectasis.  There are changes of lymph angio leiomyomatosis.     Impression:     Small right pneumothorax and chronic heart and lung changes with right lung base subsegmental atelectasis.      Assessment and Plan     44 y.o. female with lymphangioleiomyomatosis presents to clinic for 2 week follow up s/p right VATS pleurodesis.   Pain controlled off medications. CXR expected post op. Possible small costophrenic angle Ptx     PLAN    Follow-up with Pulmonology for HEBERT. Return to thoracic prn.

## 2024-12-27 NOTE — TELEPHONE ENCOUNTER
Received financial authorization from . Attempted to contact patient about lung transplant referral, scheduling. Left voicemail requesting return call. Patient will need CXR and 6MWT per provider request. Note patient referred to [facility]'s as well. Requested patient return call to verify  interest in referral scheduling..

## 2024-12-30 ENCOUNTER — HOSPITAL ENCOUNTER (OUTPATIENT)
Dept: RADIOLOGY | Facility: HOSPITAL | Age: 44
Discharge: HOME | End: 2024-12-30
Attending: STUDENT IN AN ORGANIZED HEALTH CARE EDUCATION/TRAINING PROGRAM
Payer: COMMERCIAL

## 2024-12-30 ENCOUNTER — TELEPHONE (OUTPATIENT)
Dept: TRANSPLANT | Facility: CLINIC | Age: 44
End: 2024-12-30
Payer: COMMERCIAL

## 2024-12-30 ENCOUNTER — OFFICE VISIT (OUTPATIENT)
Dept: CARDIOTHORACIC SURGERY | Facility: CLINIC | Age: 44
End: 2024-12-30
Attending: STUDENT IN AN ORGANIZED HEALTH CARE EDUCATION/TRAINING PROGRAM
Payer: COMMERCIAL

## 2024-12-30 VITALS
SYSTOLIC BLOOD PRESSURE: 114 MMHG | OXYGEN SATURATION: 92 % | HEIGHT: 66 IN | DIASTOLIC BLOOD PRESSURE: 77 MMHG | HEART RATE: 72 BPM | WEIGHT: 115.31 LBS | BODY MASS INDEX: 18.53 KG/M2

## 2024-12-30 DIAGNOSIS — J93.12 SECONDARY SPONTANEOUS PNEUMOTHORAX: ICD-10-CM

## 2024-12-30 DIAGNOSIS — Z76.82 LUNG TRANSPLANT CANDIDATE: ICD-10-CM

## 2024-12-30 DIAGNOSIS — J84.81 LYMPHANGIOLEIOMYOMATOSIS: Primary | ICD-10-CM

## 2024-12-30 DIAGNOSIS — J93.12 SECONDARY SPONTANEOUS PNEUMOTHORAX: Primary | ICD-10-CM

## 2024-12-30 PROCEDURE — 1159F MED LIST DOCD IN RCRD: CPT | Mod: CPTII,NTX,S$GLB, | Performed by: STUDENT IN AN ORGANIZED HEALTH CARE EDUCATION/TRAINING PROGRAM

## 2024-12-30 PROCEDURE — 3074F SYST BP LT 130 MM HG: CPT | Mod: CPTII,NTX,S$GLB, | Performed by: STUDENT IN AN ORGANIZED HEALTH CARE EDUCATION/TRAINING PROGRAM

## 2024-12-30 PROCEDURE — 99024 POSTOP FOLLOW-UP VISIT: CPT | Mod: NTX,S$GLB,, | Performed by: STUDENT IN AN ORGANIZED HEALTH CARE EDUCATION/TRAINING PROGRAM

## 2024-12-30 PROCEDURE — 3078F DIAST BP <80 MM HG: CPT | Mod: CPTII,NTX,S$GLB, | Performed by: STUDENT IN AN ORGANIZED HEALTH CARE EDUCATION/TRAINING PROGRAM

## 2024-12-30 PROCEDURE — 99999 PR PBB SHADOW E&M-EST. PATIENT-LVL III: CPT | Mod: PBBFAC,TXP,, | Performed by: STUDENT IN AN ORGANIZED HEALTH CARE EDUCATION/TRAINING PROGRAM

## 2024-12-30 PROCEDURE — 71046 X-RAY EXAM CHEST 2 VIEWS: CPT | Mod: TC,TXP

## 2024-12-30 NOTE — TELEPHONE ENCOUNTER
"Received financial authorization from . Contacted patient about lung transplant referral, scheduling. Patient is interested and requests next available date with Dr. Brandon. Offered January 6. Patient agreeable to this date and time. Patient is aware she will need to obtain 6MWT with visit, CXR completed today (12/30/24). All questions answered at this time. Appointment information available in [facility] portal.     ----- Message from Kofi sent at 12/30/2024  9:51 AM CST -----  Regarding: miss call  Consult/Advisory:        Name Of Caller: Self        Contact Preference?:[phone]        What is the nature of the call?: Returning call to Kaylen        Additional Notes:  "Thank you for all that you do for our patients"  "

## 2025-01-03 ENCOUNTER — TELEPHONE (OUTPATIENT)
Dept: TRANSPLANT | Facility: CLINIC | Age: 45
End: 2025-01-03
Payer: COMMERCIAL

## 2025-01-06 ENCOUNTER — OFFICE VISIT (OUTPATIENT)
Dept: TRANSPLANT | Facility: CLINIC | Age: 45
End: 2025-01-06
Payer: COMMERCIAL

## 2025-01-06 ENCOUNTER — HOSPITAL ENCOUNTER (OUTPATIENT)
Dept: RADIOLOGY | Facility: HOSPITAL | Age: 45
Discharge: HOME | End: 2025-01-06
Attending: INTERNAL MEDICINE
Payer: COMMERCIAL

## 2025-01-06 ENCOUNTER — HOSPITAL ENCOUNTER (OUTPATIENT)
Dept: PULMONOLOGY | Facility: CLINIC | Age: 45
Discharge: HOME | End: 2025-01-06
Payer: COMMERCIAL

## 2025-01-06 VITALS
BODY MASS INDEX: 18.48 KG/M2 | BODY MASS INDEX: 18.48 KG/M2 | OXYGEN SATURATION: 94 % | RESPIRATION RATE: 20 BRPM | SYSTOLIC BLOOD PRESSURE: 123 MMHG | HEART RATE: 104 BPM | WEIGHT: 115 LBS | TEMPERATURE: 99 F | DIASTOLIC BLOOD PRESSURE: 76 MMHG | HEIGHT: 66 IN | WEIGHT: 115 LBS | HEIGHT: 66 IN

## 2025-01-06 DIAGNOSIS — Z76.82 LUNG TRANSPLANT CANDIDATE: Primary | ICD-10-CM

## 2025-01-06 DIAGNOSIS — J84.81 LYMPHANGIOLEIOMYOMATOSIS: ICD-10-CM

## 2025-01-06 DIAGNOSIS — J06.9 URTI (ACUTE UPPER RESPIRATORY INFECTION): ICD-10-CM

## 2025-01-06 DIAGNOSIS — J96.11 CHRONIC RESPIRATORY FAILURE WITH HYPOXIA: ICD-10-CM

## 2025-01-06 DIAGNOSIS — Z76.82 LUNG TRANSPLANT CANDIDATE: ICD-10-CM

## 2025-01-06 LAB
INFLUENZA A, MOLECULAR: NOT DETECTED
INFLUENZA B, MOLECULAR: NOT DETECTED
RSV AG BY MOLECULAR METHOD: NOT DETECTED
SARS-COV-2 RNA RESP QL NAA+PROBE: NOT DETECTED

## 2025-01-06 PROCEDURE — 99999 PR PBB SHADOW E&M-EST. PATIENT-LVL V: CPT | Mod: PBBFAC,TXP,, | Performed by: INTERNAL MEDICINE

## 2025-01-06 PROCEDURE — 94618 PULMONARY STRESS TESTING: CPT | Mod: NTX,S$GLB,, | Performed by: INTERNAL MEDICINE

## 2025-01-06 PROCEDURE — 0241U SARS-COV2 (COVID) WITH FLU/RSV BY PCR: CPT | Mod: TXP | Performed by: INTERNAL MEDICINE

## 2025-01-06 PROCEDURE — 1159F MED LIST DOCD IN RCRD: CPT | Mod: CPTII,NTX,S$GLB, | Performed by: INTERNAL MEDICINE

## 2025-01-06 PROCEDURE — 71046 X-RAY EXAM CHEST 2 VIEWS: CPT | Mod: TC,TXP

## 2025-01-06 PROCEDURE — 99214 OFFICE O/P EST MOD 30 MIN: CPT | Mod: 25,NTX,S$GLB, | Performed by: INTERNAL MEDICINE

## 2025-01-06 PROCEDURE — 1111F DSCHRG MED/CURRENT MED MERGE: CPT | Mod: CPTII,NTX,S$GLB, | Performed by: INTERNAL MEDICINE

## 2025-01-06 PROCEDURE — 3008F BODY MASS INDEX DOCD: CPT | Mod: CPTII,NTX,S$GLB, | Performed by: INTERNAL MEDICINE

## 2025-01-06 PROCEDURE — 3078F DIAST BP <80 MM HG: CPT | Mod: CPTII,NTX,S$GLB, | Performed by: INTERNAL MEDICINE

## 2025-01-06 PROCEDURE — 71046 X-RAY EXAM CHEST 2 VIEWS: CPT | Mod: 26,NTX,, | Performed by: RADIOLOGY

## 2025-01-06 PROCEDURE — 3074F SYST BP LT 130 MM HG: CPT | Mod: CPTII,NTX,S$GLB, | Performed by: INTERNAL MEDICINE

## 2025-01-06 PROCEDURE — 1160F RVW MEDS BY RX/DR IN RCRD: CPT | Mod: CPTII,NTX,S$GLB, | Performed by: INTERNAL MEDICINE

## 2025-01-06 RX ORDER — AMOXICILLIN AND CLAVULANATE POTASSIUM 500; 125 MG/1; MG/1
1 TABLET, FILM COATED ORAL 2 TIMES DAILY
Qty: 20 TABLET | Refills: 0 | Status: SHIPPED | OUTPATIENT
Start: 2025-01-06

## 2025-01-06 RX ORDER — OXYMETAZOLINE HYDROCHLORIDE 30 G/1
CREAM TOPICAL DAILY
COMMUNITY
Start: 2024-12-31

## 2025-01-06 RX ORDER — IVERMECTIN 10 MG/G
CREAM TOPICAL NIGHTLY
COMMUNITY
Start: 2024-11-07

## 2025-01-06 NOTE — LETTER
January 7, 2025        Becky Herzog  [address] [place] [address]  Phone: [phone]  Fax: [phone]             [facility] - Transplant 1st Fl  [address]  [facility] [address]  Phone: [phone]   Patient: Ann-Marie Saavedra   MR Number: [id number]   YOB: 1980   Date of Visit: 1/6/2025       Dear Dr. Becky Herzog    Thank you for referring Ann-Marie Saavedra to me for evaluation. Attached you will find relevant portions of my assessment and plan of care.    If you have questions, please do not hesitate to call me. I look forward to following Ann-Marie Saavedra along with you.    Sincerely,    Kesha Brandon, DO    Enclosure    If you would like to receive this communication electronically, please contact [email or web address] or [phone] to request [employer] Link access.    [employer] is a tool which provides read-only access to select patient information with whom you have a relationship. Its easy to use and provides real time access to review your patients record including encounter summaries, notes, results, and demographic information.    If you feel you have received this communication in error or would no longer like to receive these types of communications, please e-mail [email or web address]

## 2025-01-06 NOTE — PROGRESS NOTES
LUNG TRANSPLANT INITIAL EVALUATION                                                                                                                                             Reason for Visit:  Evaluation for lung transplant; HEBERT    Referring Physician: Becky Herzog MD    History of Present Illness: Ann-Marie Saavedra is a 44 y.o. female who is on 0L of oxygen.  She is on no assisted ventilation.  Her New York Heart Association Class is III and a Karnofsky score of 70% - Cares for self: Unable to carry on normal activity or active work. She is not diabetic.    Requires Supplemental O2: No    Massive Hemoptysis: 0 occurrences  (Enter the number of times in the last year)    Exacerbations: 1 occurrences (1 spontaneous PTX)  (Enter the number of times in the last year)    Microbiology Infections: No    Thoracic Surgery: Spontaneous RIGHT sided PTX with VATs and doxy pleurodesis     Pt here today for initial lung transplant evaluation for a diagnosis of HEBERT.  She was recently diagnosed with HEBERT the end of 2024.  She presented to Dr. Herzog with complaints of dyspnea worsening over the last few years.  She was previously very healthy and very active.  Ran marathons and played tennis regularly.  She has young children and has noticed difficulty ambulating up stairs and having more dyspnea with daily activities.  She was found to have severe obstruction on PFTs during her visit with Dr. Herzog.  CT chest showed a moderate right sided pneumothorax and innumerable pulmonary cysts consistent with HEBERT.  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  She then continued to have dyspnea and presented to [facility] where she had a right sided chest tube placed for the pneumothorax.  She continued to have an airleak so thoracic surgery was consulted and after discussions with the LUT team, she proceeded with right sided VATs/pleurodesis.      Since then, she feels slightly  "improved.  Still has significant dyspnea with climbing stairs and daily activities.  Does not wear supplemental oxygen.  Is taking sirolimus.  Has noted sinus congestion and a cough since discharge.  She denies any productive sputum but states that her cough feels very congested and "wet".  She has been taking OTC meds    She is  with children.  No smoking history.  No history of blood transfusions.  No illicit drugs or heavy alcohol use.  No family hx of HEBERT or tuberous sclerosis.  No skin lesions.  No neuro history or seizures.  No renal dysfunction or GYN issues.  She was found to have a renal mass possibly related to HEBERT.        Past Medical History:   Diagnosis Date    Abnormal Pap smear 2013    no colpo    Keloid cicatrix     under right breast    Wound dehiscence in puerperium, perineal, delivered/postpartum 9/25/2015       Past Surgical History:   Procedure Laterality Date    CYST REMOVAL Right 2009    chest wall    FOOT TENDON SURGERY Bilateral 2008    bunion    INJECTION OF ANESTHETIC AGENT AROUND MULTIPLE INTERCOSTAL NERVES Right 12/11/2024    Procedure: BLOCK, NERVE, INTERCOSTAL, 2 OR MORE;  Surgeon: Gerson Alvarado MD;  Location: [facility] OR [address] [place] [address];  Service: Cardiothoracic;  Laterality: Right;    PLEURODESIS WITH VIDEO-ASSISTED THORACOSCOPIC SURGERY (VATS) Right 12/11/2024    Procedure: VATS, WITH PLEURODESIS;  Surgeon: Gerson Alvarado MD;  Location: [facility] OR [address] [place] [address];  Service: Cardiothoracic;  Laterality: Right;       Allergies: Patient has no known allergies.    Current Outpatient Medications   Medication Sig    doxycycline (ORACEA) 40 mg capsule Take 40 mg by mouth once daily.    ivermectin (SOOLANTRA) 1 % Crea nightly.    RHOFADE 1 % Crea Apply topically once daily.    sirolimus (RAPAMUNE) 1 MG Tab Take 1 tablet (1 mg total) by mouth once daily.    albuterol (VENTOLIN HFA) 90 mcg/actuation inhaler Inhale 2 puffs into the lungs every 6 (six) hours as needed for Wheezing or Shortness of " Breath. Rescue (Patient not taking: Reported on 1/6/2025)    azelastine (ASTELIN) 137 mcg (0.1 %) nasal spray 2 sprays (274 mcg total) by Nasal route 2 (two) times daily. (Patient not taking: Reported on 1/6/2025)    gabapentin (NEURONTIN) 300 MG capsule Take 1 capsule (300 mg total) by mouth every evening. (Patient not taking: Reported on 1/6/2025)    levocetirizine (XYZAL) 5 MG tablet Take 1 tablet (5 mg total) by mouth every evening. (Patient not taking: Reported on 1/6/2025)    multivitamin (THERAGRAN) per tablet Take 1 tablet by mouth once daily. (Patient not taking: Reported on 1/6/2025)     No current facility-administered medications for this visit.       Immunization History   Administered Date(s) Administered    COVID-19, MRNA, LN-S, PF (Pfizer) (Purple Cap) 03/09/2021, 03/30/2021, 11/20/2021    Influenza - Quadrivalent - MDCK - PF 10/06/2020    Influenza - Quadrivalent - PF *Preferred* (6 months and older) 09/17/2015, 10/26/2017, 10/29/2022    Influenza - Trivalent - Fluarix, Flulaval, Fluzone, Afluria - PF 11/27/2024    Tdap 06/22/2015, 09/07/2017     Family History:    Family History   Problem Relation Name Age of Onset    Hyperlipidemia Mother      No Known Problems Father      Eczema Sister      No Known Problems Sister      No Known Problems Brother      Lung cancer Maternal Grandmother Polly Reeves     Cancer Maternal Grandmother Polly Reeves     Colon cancer Maternal Grandfather MIGNONRACHEL Leandro     Cancer Maternal Grandfather SHOSHANASOPHIA Leandro     Colon cancer Paternal Grandfather Hugo Glez 67    Cancer Paternal Grandfather Hugo Glez     Prostate cancer Paternal Uncle Shady Glez     Cancer Paternal Uncle Shady Glez     Melanoma Neg Hx      Acne Neg Hx      Lupus Neg Hx      Psoriasis Neg Hx      Breast cancer Neg Hx      Ovarian cancer Neg Hx      Heart attack Neg Hx      Stroke Neg Hx      Diabetes Neg Hx      Osteoporosis Neg Hx       Social History     Substance and Sexual Activity    Alcohol Use Yes    Alcohol/week: 5.0 standard drinks of alcohol    Types: 5 Glasses of wine per week      Social History     Substance and Sexual Activity   Drug Use No      Social History     Socioeconomic History    Marital status:    Occupational History    Occupation:      Employer: OTHER   Tobacco Use    Smoking status: Never    Smokeless tobacco: Never   Substance and Sexual Activity    Alcohol use: Yes     Alcohol/week: 5.0 standard drinks of alcohol     Types: 5 Glasses of wine per week    Drug use: No    Sexual activity: Yes     Partners: Male     Birth control/protection: None     Social Drivers of Health     Financial Resource Strain: Low Risk  (12/10/2024)    Overall Financial Resource Strain (CARDIA)     Difficulty of Paying Living Expenses: Not hard at all   Food Insecurity: No Food Insecurity (12/10/2024)    Hunger Vital Sign     Worried About Running Out of Food in the Last Year: Never true     Ran Out of Food in the Last Year: Never true   Transportation Needs: No Transportation Needs (12/10/2024)    TRANSPORTATION NEEDS     Transportation : No   Physical Activity: Sufficiently Active (12/7/2024)    Exercise Vital Sign     Days of Exercise per Week: 4 days     Minutes of Exercise per Session: 40 min   Stress: Stress Concern Present (12/10/2024)    [employer] [place] [employer] - Occupational Stress Questionnaire     Feeling of Stress : To some extent   Housing Stability: Low Risk  (12/10/2024)    Housing Stability Vital Sign     Unable to Pay for Housing in the Last Year: No     Homeless in the Last Year: No     Review of Systems   Constitutional:  Negative for chills, diaphoresis, fever, malaise/fatigue and weight loss.   HENT:  Negative for congestion, ear discharge, ear pain, hearing loss, nosebleeds, sinus pain, sore throat and tinnitus.    Eyes:  Negative for blurred vision, double vision, photophobia, pain, discharge and redness.   Respiratory:  Positive for  "cough and shortness of breath. Negative for hemoptysis, sputum production, wheezing and stridor.    Cardiovascular:  Positive for chest pain. Negative for palpitations, orthopnea, claudication, leg swelling and PND.   Gastrointestinal:  Negative for abdominal pain, blood in stool, constipation, diarrhea, heartburn, melena, nausea and vomiting.   Genitourinary:  Negative for dysuria, flank pain, frequency, hematuria and urgency.   Musculoskeletal:  Negative for back pain, falls, joint pain, myalgias and neck pain.   Skin:  Negative for itching and rash.   Neurological:  Negative for dizziness, tingling, tremors, sensory change, speech change, focal weakness, seizures, loss of consciousness, weakness and headaches.   Endo/Heme/Allergies:  Negative for environmental allergies and polydipsia. Does not bruise/bleed easily.   Psychiatric/Behavioral:  Negative for depression, hallucinations, memory loss, substance abuse and suicidal ideas. The patient is not nervous/anxious and does not have insomnia.      Vitals  /76 (BP Location: Left arm, Patient Position: Sitting)   Pulse 104   Temp 98.8 °F (37.1 °C) (Oral)   Resp 20   Ht 5' 6" (1.676 m)   Wt 52.2 kg (115 lb)   SpO2 (!) 94%   BMI 18.56 kg/m²   Physical Exam  Vitals and nursing note reviewed.   Constitutional:       General: She is not in acute distress.     Appearance: She is well-developed. She is not diaphoretic.   HENT:      Head: Normocephalic and atraumatic.      Nose: Nose normal.      Mouth/Throat:      Pharynx: No oropharyngeal exudate.   Eyes:      General: No scleral icterus.     Conjunctiva/sclera: Conjunctivae normal.      Pupils: Pupils are equal, round, and reactive to light.   Neck:      Thyroid: No thyromegaly.      Vascular: No JVD.      Trachea: No tracheal deviation.   Cardiovascular:      Rate and Rhythm: Normal rate and regular rhythm.      Heart sounds: Normal heart sounds. No murmur heard.     No friction rub. No gallop.   Pulmonary: "      Effort: Pulmonary effort is normal. No respiratory distress.      Breath sounds: Decreased breath sounds present. No wheezing or rales.   Abdominal:      General: Bowel sounds are normal. There is no distension.      Palpations: Abdomen is soft.      Tenderness: There is no abdominal tenderness.   Musculoskeletal:         General: No deformity. Normal range of motion.      Cervical back: Normal range of motion and neck supple.   Skin:     General: Skin is warm and dry.      Capillary Refill: Capillary refill takes less than 2 seconds.      Coloration: Skin is not pale.      Findings: No erythema or rash.   Neurological:      Mental Status: She is alert and oriented to person, place, and time.      Cranial Nerves: No cranial nerve deficit.   Psychiatric:         Judgment: Judgment normal.         Labs:  No visits with results within 7 Day(s) from this visit.   Latest known visit with results is:   No results displayed because visit has over 200 results.               No data to display                  1/6/2025    12:49 PM   6MW   6MWT Status completed without stopping   Patient Reported Dyspnea   Was O2 used? Yes   Delivery Method Cannula;Pull Tank;Continuous Flow   6MW Distance walked (feet) 800 feet   Distance walked (meters) 243.84 meters   Did patient stop? No   Oxygen Saturation 93 %   Supplemental Oxygen Room Air   Heart Rate 98 bpm   Blood Pressure 105/51   Vitaliy Dyspnea Rating  light   Oxygen Saturation 90 %   Supplemental Oxygen 2 L/M   Heart Rate 110 bpm   Blood Pressure 118/77   Vitaliy Dyspnea Rating  moderate   Recovery Time (seconds) 104 seconds   Oxygen Saturation 93 %   Supplemental Oxygen 2 L/M   Heart Rate 102 bpm       Imaging:  Results for orders placed during the hospital encounter of 12/30/24    X-Ray Chest PA And Lateral    Narrative  EXAMINATION:  XR CHEST PA AND LATERAL    CLINICAL HISTORY:  Secondary spontaneous pneumothorax    FINDINGS:  Heart size is small which can be seen with anorexia  or vitamin deficiency.  There is a right costophrenic angle pneumothorax status post chest tube removal.  There is partial right lung base subsegmental atelectasis.  There are changes of lymph angio leiomyomatosis.    Impression  Small right pneumothorax and chronic heart and lung changes with right lung base subsegmental atelectasis.      Electronically signed by: Julius Bautista MD  Date:    12/31/2024  Time:    10:50    Results for orders placed during the hospital encounter of 12/03/24    CT Chest Without Contrast    Narrative  EXAMINATION:  CT CHEST WITHOUT CONTRAST    CLINICAL HISTORY:  Pneumothorax; Pneumothorax, unspecified    TECHNIQUE:  Low dose axial images, sagittal and coronal reformations were obtained from the thoracic inlet to the lung bases. Contrast was not administered.    COMPARISON:  12/02/2024    FINDINGS:  Support tubes and lines: None.    Aorta: Normal caliber.    Heart: Normal size.    Coronary arteries: No calcifications.    Pericardium: Normal. No effusion, thickening, or calcification.    Central pulmonary arteries: Normal caliber.    Base of neck/thyroid: Unremarkable.    Lymph nodes: No supraclavicular, axillary, internal mammary, mediastinal, or hilar adenopathy.    Esophagus: Unremarkable.    Pleura: No effusion, thickening, or calcification.    Upper abdomen: There is a questionable fluid attenuation lesion in the left upper quadrant, incompletely evaluated, but measuring up to 5.3 x 4.3 cm on this exam (series 2, image 149).    Body wall: Unremarkable.    Airways: Unremarkable.    Lungs: A small right pneumothorax is present, stable to slightly improved when compared to 12/02/2024.  Innumerable thin walled cysts are seen throughout both lungs.    Bones: Unremarkable.    Impression  1. Small right pneumothorax, not substantially changed when compared to 12/02/2023.  2. Innumerable thin walled cysts seen throughout both lungs.  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  3. Fluid attenuation lesion in the left upper quadrant, incompletely evaluated on this exam, but measuring up to 5.3 cm.  In light of the lung findings, this may represent a lymphangioleiomyoma.  A CT of the abdomen and pelvis contrast is recommended for further evaluation.      Electronically signed by: Vanessa Gray  Date:    12/03/2024  Time:    11:41        Cardiodiagnostics:  Results for orders placed during the hospital encounter of 12/07/24    Echo    Interpretation Summary    Left Ventricle: The left ventricle is normal in size. Normal wall thickness. Septal motion is abnormal. There is normal systolic function with a visually estimated ejection fraction of 55 - 60%. There is normal diastolic function. Normal left ventricular filling pressure.    Right Ventricle: Normal right ventricular cavity size. Right ventricle wall motion  is normal. Systolic function is normal.    Left Atrium: Left atrium is mildly dilated.    Mitral Valve: There is trivial-mild regurgitation.    Tricuspid Valve: There is trace regurgitation.    IVC/SVC: Intermediate venous pressure at 8 mmHg.        Assessment:  1. Lung transplant candidate    2. Lymphangioleiomyomatosis    3. Chronic respiratory failure with hypoxia    4. URTI (acute upper respiratory infection)      Plan:   Seen as an inpatient for newly diagnosed HEBERT with spontaneous RIGHT sided pneumothorax.  S/p VATs/pleurodesis.  CXR shows small loculated right lower PTX.  CT imaging consistent with HEBERT with innumerable thin walled cysts throughout all lung fields.  Currently on sirolimus.  Diagnosis made by imaging and history.  Would not pursue lung biopsy given her hx of PTX  CBC, CMP, and sirolimus level today.  Will aim for sirolimus level of 5-10.  Will need UA, lipid panel, and pregnancy test as there are not any in our system.    Refer to pulmonary rehab.  Prescribe exertional oxygen use.  No needs at rest.    MRI abdomen to further  assess renal mass which is likely a angioleiomyolipoma  Prescribe oxygen at 2L with exertion.  Refer to pulmonary rehab.  CXR shows no focal infiltrates.  Augmentin x10 days.  Mucinex.  RIP negative for flu, RSV, and COVID.  Follow up in 3 weeks.      Kesha Brandon D.O.  Pulmonary/Critical Care and Lung Transplantation  [facility]

## 2025-01-07 ENCOUNTER — TELEPHONE (OUTPATIENT)
Dept: TRANSPLANT | Facility: CLINIC | Age: 45
End: 2025-01-07
Payer: COMMERCIAL

## 2025-01-07 DIAGNOSIS — J84.81 LYMPHANGIOLEIOMYOMATOSIS: ICD-10-CM

## 2025-01-07 DIAGNOSIS — R19.02 ABDOMINAL MASS, LEFT UPPER QUADRANT: Primary | ICD-10-CM

## 2025-01-07 DIAGNOSIS — Z79.899 HIGH RISK MEDICATION USE: ICD-10-CM

## 2025-01-07 NOTE — PROCEDURES
Ann-Marie Saavedra is a 44 y.o.   female patient, who presents for a 6 minute walk test ordered by DO Aleksandr.  The diagnosis is Pre Lung Transplant Evaluation.  The patient's BMI is 18.6 kg/m2.  Predicted distance (lower limit of normal) is 517.08 meters.      Test Results:    The test was completed without stopping.     The total time walked was 360 seconds.  During walking, the patient reported:  Dyspnea. The patient used supplemental oxygen, no assistive devices and supplemental oxygen during testing.     01/07/2025---------Distance: 243.84 meters (800 feet)    Lap Walk Time  sec O2 Sat % Supplemental Oxygen  lpm Heart Rate  bpm Blood Pressure  mmHg Vitaliy Scale   Pre-exercise  (Resting) 0  93 RA 98 105/51 2   During Exercise 1 88 88      During Exercise 2 178 93 2 113     During Exercise 3 267 90 2 117     End of Exercise  360 90 2 110 118/77 3   Post-exercise  (Recovery)   93 2 102         Recovery Time: 104 seconds    Performing nurse/tech: Michelle SIMON      PREVIOUS STUDY:   The patient has not had a previous study.      CLINICAL INTERPRETATION:  Six minute walk distance is 243.84 meters (800 feet) with moderate dyspnea.  During exercise, there was significant desaturation while breathing room air.  Both blood pressure and heart rate remained stable with walking.  The patient did not report non-pulmonary symptoms during exercise.  The patient did complete the study, walking 360 seconds of the 360 second test.  The patient may benefit from using supplemental oxygen during exertion.  No previous study performed.  Based upon age and body mass index, exercise capacity is less than predicted.

## 2025-01-07 NOTE — TELEPHONE ENCOUNTER
YADIRA faxed oxygen orders to [facility] (ph: [phone], fx: [phone]). YADIRA is following and remains available.         ----- Message from Petty Dove sent at 1/6/2025  2:00 PM CST -----  Regarding: oxygen  Orders for oxygen written. Patient will be travelling to [place] in a couple of weeks, so they want to use a national company. Can you send the orders to [facility]?    Thanks,  sl

## 2025-01-07 NOTE — TELEPHONE ENCOUNTER
----- Message from Kesha Brandon DO sent at 1/7/2025  1:54 PM CST -----  Sirolimus level at goal 5-10.  Would not change dose at this time given her current respiratory infection.  WBC likely reflective of infection; continue with augmentin.  Will need UA, pregnancy test, and lipid panel when she returns to clinic for completeness while on sirolimus.  Needs MRI abdomen w/ IV contrast for evaluation of her renal mass.      Contacted patient and notified her of the lab and nasal swab results and Dr. Brandon's instructions. Informed her that her sirolimus level is at the goal of 5 - 10. Informed her that Dr. Brandon will not adjust her dose at this time. Informed her that the elevated white blood cell count is likely reflective of her current infection. Instructed her to complete the Augmentin prescription that was prescribed per Dr. Brandon yesterday. Informed her that she will need to have a lipid panel (labs) and a pregnancy test and urinalysis when she returns to clinic which is protocol for patients who are taking sirolimus. Also notified her of Dr. Brandon's instructions for a MRI of the abdomen with IV contrast. She verbalized her understanding of all discussed and denied having any questions or concerns.

## 2025-01-08 ENCOUNTER — PATIENT MESSAGE (OUTPATIENT)
Dept: TRANSPLANT | Facility: CLINIC | Age: 45
End: 2025-01-08
Payer: COMMERCIAL

## 2025-01-08 DIAGNOSIS — J84.81 LYMPHANGIOLEIOMYOMATOSIS: Primary | ICD-10-CM

## 2025-01-08 DIAGNOSIS — R06.02 SHORTNESS OF BREATH: ICD-10-CM

## 2025-01-09 ENCOUNTER — OFFICE VISIT (OUTPATIENT)
Dept: INTERNAL MEDICINE | Facility: CLINIC | Age: 45
End: 2025-01-09
Payer: COMMERCIAL

## 2025-01-09 ENCOUNTER — TELEPHONE (OUTPATIENT)
Dept: TRANSPLANT | Facility: CLINIC | Age: 45
End: 2025-01-09
Payer: COMMERCIAL

## 2025-01-09 DIAGNOSIS — R05.9 COUGH, UNSPECIFIED TYPE: ICD-10-CM

## 2025-01-09 DIAGNOSIS — J84.81 PULMONARY LYMPHANGIOLEIOMYOMATOSIS: Primary | ICD-10-CM

## 2025-01-09 RX ORDER — PROMETHAZINE HYDROCHLORIDE AND DEXTROMETHORPHAN HYDROBROMIDE 6.25; 15 MG/5ML; MG/5ML
5 SYRUP ORAL EVERY 6 HOURS PRN
Qty: 120 ML | Refills: 0 | Status: SHIPPED | OUTPATIENT
Start: 2025-01-09 | End: 2025-01-19

## 2025-01-09 NOTE — TELEPHONE ENCOUNTER
YADIRA routed internal pulmonary rehab orders to [facility]/ [facility]. YADIRA is following and remains available.       ----- Message from Petty Dove sent at 1/8/2025  4:00 PM CST -----  Regarding: Pulmonary Rehab  Orders were entered for pulmonary rehab from Dr. Brandon. Patient would like to go to [facility].    Thanks,  sl

## 2025-01-09 NOTE — PROGRESS NOTES
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 This includes time spent in discussion with the patient and time preparing for the patient appointment: review of tests, obtaining and/or reviewing separately obtained history, documenting clinical information in the electronic or other health record, independently interpreting results (not separately reported), and communicating results to the patient/family/caregiver or care coordination (not separately reported).     Each patient to whom he or she provides medical services by telemedicine is: (1) informed of the relationship between the physician and patient and the respective role of any other health care provider with respect to management of the patient; and (2) notified that he or she may decline to receive medical services by telemedicine and may withdraw from such care at any time.      Subjective:   Ann-Marie Saavedra is a 44 y.o. female.    Patient is established with me, here today for the following:    History of Present Illness      RESPIRATORY STATUS:  She is healing from a pneumothorax, status post VATS procedure and pleurodesis with lung reinflation after hospitalization. She experiences shortness of breath with activities such as folding laundry or walking around the block. The surgical wound is almost healed with only a small scab remaining. She is awaiting delivery of pulmonary equipment, including an oxygen concentrator. She currently has a nagging cold with severe cough and phlegm that disrupts her sleep. She is taking Mucinex and cold medicine for symptom management. She has not received the pneumonia vaccine.    RENAL:  She is awaiting scheduling for an MRI next week to evaluate kidney findings.      ROS:  Respiratory: +cough,  +shortness of breath       CT chest showed a moderate right sided pneumothorax and innumerable pulmonary cysts consistent with HEBERT   Diagnosed HEBERT  Hospitalized for persistent SOB  Right sided chest tube was placed for pneumothorax  Underwent right sided VATS with pleurodesis   Taking Sirolimus  Following with Dr. Brandon for transplant pulmonology   Referred pulm rehab  Oxygen 2 lpm with activity   Taking Augmentin for URI     Current Outpatient Medications   Medication Instructions    albuterol (VENTOLIN HFA) 90 mcg/actuation inhaler 2 puffs, Inhalation, Every 6 hours PRN, Rescue    amoxicillin-clavulanate 500-125mg (AUGMENTIN) 500-125 mg Tab 500 mg, Oral, 2 times daily    azelastine (ASTELIN) 274 mcg, Nasal, 2 times daily    doxycycline (ORACEA) 40 mg, Daily    gabapentin (NEURONTIN) 300 mg, Oral, Nightly    ivermectin (SOOLANTRA) 1 % Crea Nightly    levocetirizine (XYZAL) 5 mg, Oral, Nightly    multivitamin (THERAGRAN) per tablet 1 tablet, Daily    promethazine-dextromethorphan (PROMETHAZINE-DM) 6.25-15 mg/5 mL Syrp 5 mLs, Oral, Every 6 hours PRN    RHOFADE 1 % Crea Daily    sirolimus (RAPAMUNE) 1 mg, Oral, Daily       Objective:   There were no vitals filed for this visit.     Physical Exam  Vitals reviewed.   Constitutional:       General: She is not in acute distress.     Appearance: Normal appearance. She is not ill-appearing or diaphoretic.   HENT:      Head: Normocephalic and atraumatic.      Right Ear: Tympanic membrane, ear canal and external ear normal. There is no impacted cerumen.      Left Ear: Tympanic membrane, ear canal and external ear normal. There is no impacted cerumen.      Nose: Congestion present. No rhinorrhea.      Right Nostril: No foreign body, epistaxis, septal hematoma or occlusion.      Left Nostril: No foreign body, epistaxis, septal hematoma or occlusion.      Right Turbinates: Swollen. Not pale.      Left Turbinates: Swollen. Not pale.      Mouth/Throat:      Mouth: Mucous  membranes are moist.      Pharynx: Oropharynx is clear. Posterior oropharyngeal erythema (moderate, posterior oropharynx) present. No pharyngeal swelling, oropharyngeal exudate or uvula swelling.   Eyes:      General: No scleral icterus.        Right eye: No discharge.         Left eye: No discharge.      Conjunctiva/sclera: Conjunctivae normal.   Neck:      Thyroid: No thyromegaly or thyroid tenderness.      Trachea: Trachea normal.   Cardiovascular:      Rate and Rhythm: Normal rate and regular rhythm.      Heart sounds: Normal heart sounds. No murmur heard.     No friction rub. No gallop.   Pulmonary:      Effort: Pulmonary effort is normal. No respiratory distress.      Breath sounds: No stridor. Decreased breath sounds present. No wheezing, rhonchi or rales.   Musculoskeletal:      Cervical back: Neck supple.   Lymphadenopathy:      Head:      Right side of head: No submandibular or posterior auricular adenopathy.      Left side of head: No submandibular or posterior auricular adenopathy.      Cervical: No cervical adenopathy.      Right cervical: No superficial, deep or posterior cervical adenopathy.     Left cervical: No superficial, deep or posterior cervical adenopathy.      Upper Body:      Right upper body: No supraclavicular adenopathy.      Left upper body: No supraclavicular adenopathy.   Skin:     General: Skin is warm and dry.   Neurological:      Mental Status: She is alert. Mental status is at baseline.   Psychiatric:         Mood and Affect: Mood normal.         Behavior: Behavior normal.           Assessment/Plan:   Pulmonary lymphangioleiomyomatosis    Cough, unspecified type  -     promethazine-dextromethorphan (PROMETHAZINE-DM) 6.25-15 mg/5 mL Syrp; Take 5 mLs by mouth every 6 (six) hours as needed (cough).  Dispense: 120 mL; Refill: 0      Assessment & Plan    IMPRESSION:  - Assessed patient's recovery from recent pneumothorax and VATS procedure with pleurodesis  - Evaluated ongoing  respiratory symptoms, likely due to current upper respiratory infection  - Considered need for pneumonia vaccine and additional COVID-19 vaccine dose due to patient's increased risk  - Reviewed pulmonary rehabilitation prescription by Dr. Brandon  - Considered need for further imaging if respiratory symptoms persist after antibiotic course    LYMPHANGIOLEIOMYOMATOSIS (HEBERT):  - Initiated sirolimus treatment following patient's hospitalization.  - Ann-Marie experiences dyspnea with physical exertion.  - Following with Dr. Brandon in pulmonology for evaluation.  - Scheduled follow-up visits with pulmonology and HEBERT clinic.  - Prescribed pulmonary rehabilitation.  - Considered portable oxygen concentrator for improved mobility.  - Ann-Marie requires oxygen supplementation during physical activity.    PNEUMOTHORAX:  - Confirmed resolution of patient's pneumothorax.  - Surgeon verified lung reinflation and wound healing.  - Ann-Marie underwent VATS procedure and pleurodesis.    UPPER RESPIRATORY INFECTION:  - Ann-Marie reports persistent cold symptoms with cough and sputum production.  - Considered possibility of atypical pneumonia if symptoms persist.  - Prescribed Augmentin for 10 days to treat current upper respiratory infection.  - Recommend increasing Mucinex dosage and maintaining proper hydration.  - Suggested use of Azelastin nasal spray and nasal rinses.  - Advised on proper use of distilled water for nasal rinses.  - Prescribed cough medicine.  - Ann-Marie reports persistent cough with sputum production.  - Discussed importance of hydration when using Mucinex for optimal effectiveness.    KIDNEY ABNORMALITY:  - Scheduled MRI to examine kidney abnormality.    TRANSPLANT EVALUATION:  - Ann-Marie is undergoing transplant evaluation at [facility].    FOLLOW UP:  - Will order chest XR if respiratory symptoms persist after completing antibiotic course.  - Instructed the patient to contact the office if respiratory symptoms do not  improve.         Elsi Klein MD  01/09/2025

## 2025-01-16 ENCOUNTER — PATIENT MESSAGE (OUTPATIENT)
Dept: INTERNAL MEDICINE | Facility: CLINIC | Age: 45
End: 2025-01-16
Payer: COMMERCIAL

## 2025-01-17 ENCOUNTER — PATIENT MESSAGE (OUTPATIENT)
Dept: TRANSPLANT | Facility: CLINIC | Age: 45
End: 2025-01-17
Payer: COMMERCIAL

## 2025-01-28 ENCOUNTER — HOSPITAL ENCOUNTER (OUTPATIENT)
Dept: RADIOLOGY | Facility: HOSPITAL | Age: 45
Discharge: HOME | End: 2025-01-28
Attending: INTERNAL MEDICINE
Payer: COMMERCIAL

## 2025-01-28 ENCOUNTER — OFFICE VISIT (OUTPATIENT)
Dept: TRANSPLANT | Facility: CLINIC | Age: 45
End: 2025-01-28
Payer: COMMERCIAL

## 2025-01-28 VITALS
RESPIRATION RATE: 18 BRPM | OXYGEN SATURATION: 96 % | HEIGHT: 66 IN | HEART RATE: 74 BPM | BODY MASS INDEX: 18.49 KG/M2 | DIASTOLIC BLOOD PRESSURE: 82 MMHG | WEIGHT: 115.06 LBS | SYSTOLIC BLOOD PRESSURE: 124 MMHG

## 2025-01-28 DIAGNOSIS — J84.81 LYMPHANGIOLEIOMYOMATOSIS: ICD-10-CM

## 2025-01-28 DIAGNOSIS — J96.11 CHRONIC RESPIRATORY FAILURE WITH HYPOXIA: ICD-10-CM

## 2025-01-28 DIAGNOSIS — Z76.82 LUNG TRANSPLANT CANDIDATE: Primary | ICD-10-CM

## 2025-01-28 DIAGNOSIS — R19.02 ABDOMINAL MASS, LEFT UPPER QUADRANT: ICD-10-CM

## 2025-01-28 DIAGNOSIS — Z79.899 HIGH RISK MEDICATION USE: ICD-10-CM

## 2025-01-28 PROCEDURE — A9585 GADOBUTROL INJECTION: HCPCS | Mod: TXP | Performed by: INTERNAL MEDICINE

## 2025-01-28 PROCEDURE — 3079F DIAST BP 80-89 MM HG: CPT | Mod: CPTII,S$GLB,TXP, | Performed by: INTERNAL MEDICINE

## 2025-01-28 PROCEDURE — 3074F SYST BP LT 130 MM HG: CPT | Mod: CPTII,S$GLB,TXP, | Performed by: INTERNAL MEDICINE

## 2025-01-28 PROCEDURE — 99999 PR PBB SHADOW E&M-EST. PATIENT-LVL IV: CPT | Mod: PBBFAC,TXP,, | Performed by: INTERNAL MEDICINE

## 2025-01-28 PROCEDURE — 74183 MRI ABD W/O CNTR FLWD CNTR: CPT | Mod: TC,TXP

## 2025-01-28 PROCEDURE — 3008F BODY MASS INDEX DOCD: CPT | Mod: CPTII,S$GLB,TXP, | Performed by: INTERNAL MEDICINE

## 2025-01-28 PROCEDURE — 25500020 PHARM REV CODE 255: Mod: TXP | Performed by: INTERNAL MEDICINE

## 2025-01-28 PROCEDURE — 74183 MRI ABD W/O CNTR FLWD CNTR: CPT | Mod: 26,TXP,, | Performed by: RADIOLOGY

## 2025-01-28 PROCEDURE — 1160F RVW MEDS BY RX/DR IN RCRD: CPT | Mod: CPTII,S$GLB,TXP, | Performed by: INTERNAL MEDICINE

## 2025-01-28 PROCEDURE — 99214 OFFICE O/P EST MOD 30 MIN: CPT | Mod: S$GLB,TXP,, | Performed by: INTERNAL MEDICINE

## 2025-01-28 PROCEDURE — 1159F MED LIST DOCD IN RCRD: CPT | Mod: CPTII,S$GLB,TXP, | Performed by: INTERNAL MEDICINE

## 2025-01-28 RX ORDER — GADOBUTROL 604.72 MG/ML
10 INJECTION INTRAVENOUS
Status: COMPLETED | OUTPATIENT
Start: 2025-01-28 | End: 2025-01-28

## 2025-01-28 RX ADMIN — GADOBUTROL 10 ML: 604.72 INJECTION INTRAVENOUS at 04:01

## 2025-01-28 NOTE — PROGRESS NOTES
PRE LUNG TRANSPLANT FOLLOW UP                                                                                                                                           Reason for Visit:  Evaluation for lung transplant; HEBERT    Referring Physician: No ref. provider found    History of Present Illness: Ann-Marie Saavedra is a 44 y.o. female who is on 0L of oxygen.  She is on no assisted ventilation.  Her New York Heart Association Class is III and a Karnofsky score of 70% - Cares for self: Unable to carry on normal activity or active work. She is not diabetic.    Requires Supplemental O2: No    Massive Hemoptysis: 0 occurrences  (Enter the number of times in the last year)    Exacerbations: 1 occurrences (1 spontaneous PTX)  (Enter the number of times in the last year)    Microbiology Infections: No    Thoracic Surgery: Spontaneous RIGHT sided PTX with VATs and doxy pleurodesis     Presents today for routine follow up.  Since her last visit, she has been feeling much improved.  Her cough and URTI symptoms have resolved.  She has been increasing her activity and is walking more.  She has oxygen which was prescribed at her last visit but she states that since she has been feeling better, she no longer desaturates with exertion and no longer needs oxygen.  She no longer has chest pain at the incision site.  Wants to do more activity.  Overall, doing much better than previous.      INITIAL HPI:  Pt here today for initial lung transplant evaluation for a diagnosis of HEBERT.  She was recently diagnosed with HEBERT the end of 2024.  She presented to Dr. Herzog with complaints of dyspnea worsening over the last few years.  She was previously very healthy and very active.  Ran marathons and played tennis regularly.  She has young children and has noticed difficulty ambulating up stairs and having more dyspnea with daily activities.  She was found to have severe obstruction on PFTs during her visit with Dr. Herzog.  CT chest showed a  "moderate right sided pneumothorax and innumerable pulmonary cysts consistent with HEBERT.  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  She then continued to have dyspnea and presented to [facility] ED where she had a right sided chest tube placed for the pneumothorax.  She continued to have an airleak so thoracic surgery was consulted and after discussions with the LUT team, she proceeded with right sided VATs/pleurodesis.      Since then, she feels slightly improved.  Still has significant dyspnea with climbing stairs and daily activities.  Does not wear supplemental oxygen.  Is taking sirolimus.  Has noted sinus congestion and a cough since discharge.  She denies any productive sputum but states that her cough feels very congested and "wet".  She has been taking OTC meds    She is  with children.  No smoking history.  No history of blood transfusions.  No illicit drugs or heavy alcohol use.  No family hx of HEBERT or tuberous sclerosis.  No skin lesions.  No neuro history or seizures.  No renal dysfunction or GYN issues.  She was found to have a renal mass possibly related to HEBERT.        Past Medical History:   Diagnosis Date    Abnormal Pap smear 2013    no colpo    Keloid cicatrix     under right breast    Wound dehiscence in puerperium, perineal, delivered/postpartum 9/25/2015       Past Surgical History:   Procedure Laterality Date    CYST REMOVAL Right 2009    chest wall    FOOT TENDON SURGERY Bilateral 2008    bunion    INJECTION OF ANESTHETIC AGENT AROUND MULTIPLE INTERCOSTAL NERVES Right 12/11/2024    Procedure: BLOCK, NERVE, INTERCOSTAL, 2 OR MORE;  Surgeon: Gerson Alvarado MD;  Location: [facility] OR [address] [place] [address];  Service: Cardiothoracic;  Laterality: Right;    PLEURODESIS WITH VIDEO-ASSISTED THORACOSCOPIC SURGERY (VATS) Right 12/11/2024    Procedure: VATS, WITH PLEURODESIS;  Surgeon: Gerson Alvarado MD;  Location: [facility] OR [address] [place] [address];  Service: " Cardiothoracic;  Laterality: Right;       Allergies: Patient has no known allergies.    Current Outpatient Medications   Medication Sig    doxycycline (ORACEA) 40 mg capsule Take 40 mg by mouth once daily.    ivermectin (SOOLANTRA) 1 % Crea nightly.    RHOFADE 1 % Crea Apply topically once daily.    sirolimus (RAPAMUNE) 1 MG Tab Take 1 tablet (1 mg total) by mouth once daily.    multivitamin (THERAGRAN) per tablet Take 1 tablet by mouth once daily. (Patient not taking: Reported on 1/28/2025)     No current facility-administered medications for this visit.       Immunization History   Administered Date(s) Administered    COVID-19, MRNA, LN-S, PF (Pfizer) (Purple Cap) 03/09/2021, 03/30/2021, 11/20/2021    Influenza - Quadrivalent - MDCK - PF 10/06/2020    Influenza - Quadrivalent - PF *Preferred* (6 months and older) 09/17/2015, 10/26/2017, 10/29/2022    Influenza - Trivalent - Fluarix, Flulaval, Fluzone, Afluria - PF 11/27/2024    Tdap 06/22/2015, 09/07/2017     Family History:    Family History   Problem Relation Name Age of Onset    Hyperlipidemia Mother      No Known Problems Father      Eczema Sister      No Known Problems Sister      No Known Problems Brother      Lung cancer Maternal Grandmother Polly Lewis     Cancer Maternal Grandmother Pollytea Reeves     Colon cancer Maternal Grandfather MIGNONRACHEL Leandro     Cancer Maternal Grandfather MIGNONRACHEL Leandro     Colon cancer Paternal Grandfather Hugo Glez 67    Cancer Paternal Grandfather Hugo Glez     Prostate cancer Paternal Uncle Shady Glez     Cancer Paternal Uncle Shady Glez     Melanoma Neg Hx      Acne Neg Hx      Lupus Neg Hx      Psoriasis Neg Hx      Breast cancer Neg Hx      Ovarian cancer Neg Hx      Heart attack Neg Hx      Stroke Neg Hx      Diabetes Neg Hx      Osteoporosis Neg Hx       Social History     Substance and Sexual Activity   Alcohol Use Yes    Alcohol/week: 5.0 standard drinks of alcohol    Types: 5 Glasses of wine per week       Social History     Substance and Sexual Activity   Drug Use No      Social History     Socioeconomic History    Marital status:    Occupational History    Occupation:      Employer: OTHER   Tobacco Use    Smoking status: Never    Smokeless tobacco: Never   Substance and Sexual Activity    Alcohol use: Yes     Alcohol/week: 5.0 standard drinks of alcohol     Types: 5 Glasses of wine per week    Drug use: No    Sexual activity: Yes     Partners: Male     Birth control/protection: None     Social Drivers of Health     Financial Resource Strain: Low Risk  (12/10/2024)    Overall Financial Resource Strain (CARDIA)     Difficulty of Paying Living Expenses: Not hard at all   Food Insecurity: No Food Insecurity (12/10/2024)    Hunger Vital Sign     Worried About Running Out of Food in the Last Year: Never true     Ran Out of Food in the Last Year: Never true   Transportation Needs: No Transportation Needs (12/10/2024)    TRANSPORTATION NEEDS     Transportation : No   Physical Activity: Sufficiently Active (12/7/2024)    Exercise Vital Sign     Days of Exercise per Week: 4 days     Minutes of Exercise per Session: 40 min   Stress: Stress Concern Present (12/10/2024)    [employer] [place] [employer] - Occupational Stress Questionnaire     Feeling of Stress : To some extent   Housing Stability: Low Risk  (12/10/2024)    Housing Stability Vital Sign     Unable to Pay for Housing in the Last Year: No     Homeless in the Last Year: No     Review of Systems   Constitutional:  Negative for chills, diaphoresis, fever, malaise/fatigue and weight loss.   HENT:  Negative for congestion, ear discharge, ear pain, hearing loss, nosebleeds, sinus pain, sore throat and tinnitus.    Eyes:  Negative for blurred vision, double vision, photophobia, pain, discharge and redness.   Respiratory:  Positive for cough and shortness of breath. Negative for hemoptysis, sputum production, wheezing and stridor.   "  Cardiovascular:  Positive for chest pain. Negative for palpitations, orthopnea, claudication, leg swelling and PND.   Gastrointestinal:  Negative for abdominal pain, blood in stool, constipation, diarrhea, heartburn, melena, nausea and vomiting.   Genitourinary:  Negative for dysuria, flank pain, frequency, hematuria and urgency.   Musculoskeletal:  Negative for back pain, falls, joint pain, myalgias and neck pain.   Skin:  Negative for itching and rash.   Neurological:  Negative for dizziness, tingling, tremors, sensory change, speech change, focal weakness, seizures, loss of consciousness, weakness and headaches.   Endo/Heme/Allergies:  Negative for environmental allergies and polydipsia. Does not bruise/bleed easily.   Psychiatric/Behavioral:  Negative for depression, hallucinations, memory loss, substance abuse and suicidal ideas. The patient is not nervous/anxious and does not have insomnia.      Vitals  /82 (BP Location: Right arm, Patient Position: Sitting)   Pulse 74   Resp 18   Ht 5' 6" (1.676 m)   Wt 52.2 kg (115 lb 1.3 oz)   SpO2 96%   BMI 18.57 kg/m²   Physical Exam  Vitals and nursing note reviewed.   Constitutional:       General: She is not in acute distress.     Appearance: She is well-developed. She is not diaphoretic.   HENT:      Head: Normocephalic and atraumatic.      Nose: Nose normal.      Mouth/Throat:      Pharynx: No oropharyngeal exudate.   Eyes:      General: No scleral icterus.     Conjunctiva/sclera: Conjunctivae normal.      Pupils: Pupils are equal, round, and reactive to light.   Neck:      Thyroid: No thyromegaly.      Vascular: No JVD.      Trachea: No tracheal deviation.   Cardiovascular:      Rate and Rhythm: Normal rate and regular rhythm.      Heart sounds: Normal heart sounds. No murmur heard.     No friction rub. No gallop.   Pulmonary:      Effort: Pulmonary effort is normal. No respiratory distress.      Breath sounds: Decreased breath sounds present. No " wheezing or rales.   Abdominal:      General: Bowel sounds are normal. There is no distension.      Palpations: Abdomen is soft.      Tenderness: There is no abdominal tenderness.   Musculoskeletal:         General: No deformity. Normal range of motion.      Cervical back: Normal range of motion and neck supple.   Skin:     General: Skin is warm and dry.      Capillary Refill: Capillary refill takes less than 2 seconds.      Coloration: Skin is not pale.      Findings: No erythema or rash.   Neurological:      Mental Status: She is alert and oriented to person, place, and time.      Cranial Nerves: No cranial nerve deficit.   Psychiatric:         Judgment: Judgment normal.         Labs:  Lab Visit on 01/28/2025   Component Date Value    Cholesterol 01/28/2025 199     Triglycerides 01/28/2025 65     HDL 01/28/2025 75     LDL Cholesterol 01/28/2025 111.0     HDL/Cholesterol Ratio 01/28/2025 37.7     Total Cholesterol/HDL Ra* 01/28/2025 2.7     Non-HDL Cholesterol 01/28/2025 124             No data to display                  1/6/2025    12:49 PM   6MW   6MWT Status completed without stopping   Patient Reported Dyspnea   Was O2 used? Yes   Delivery Method Cannula;Pull Tank;Continuous Flow   6MW Distance walked (feet) 800 feet   Distance walked (meters) 243.84 meters   Did patient stop? No   Oxygen Saturation 93 %   Supplemental Oxygen Room Air   Heart Rate 98 bpm   Blood Pressure 105/51   Vitaliy Dyspnea Rating  light   Oxygen Saturation 90 %   Supplemental Oxygen 2 L/M   Heart Rate 110 bpm   Blood Pressure 118/77   Vitaliy Dyspnea Rating  moderate   Recovery Time (seconds) 104 seconds   Oxygen Saturation 93 %   Supplemental Oxygen 2 L/M   Heart Rate 102 bpm       Imaging:  Results for orders placed during the hospital encounter of 12/30/24    X-Ray Chest PA And Lateral    Narrative  EXAMINATION:  XR CHEST PA AND LATERAL    CLINICAL HISTORY:  Secondary spontaneous pneumothorax    FINDINGS:  Heart size is small which can be  seen with anorexia or vitamin deficiency.  There is a right costophrenic angle pneumothorax status post chest tube removal.  There is partial right lung base subsegmental atelectasis.  There are changes of lymph angio leiomyomatosis.    Impression  Small right pneumothorax and chronic heart and lung changes with right lung base subsegmental atelectasis.      Electronically signed by: Julius Bautista MD  Date:    12/31/2024  Time:    10:50    Results for orders placed during the hospital encounter of 12/03/24    CT Chest Without Contrast    Narrative  EXAMINATION:  CT CHEST WITHOUT CONTRAST    CLINICAL HISTORY:  Pneumothorax; Pneumothorax, unspecified    TECHNIQUE:  Low dose axial images, sagittal and coronal reformations were obtained from the thoracic inlet to the lung bases. Contrast was not administered.    COMPARISON:  12/02/2024    FINDINGS:  Support tubes and lines: None.    Aorta: Normal caliber.    Heart: Normal size.    Coronary arteries: No calcifications.    Pericardium: Normal. No effusion, thickening, or calcification.    Central pulmonary arteries: Normal caliber.    Base of neck/thyroid: Unremarkable.    Lymph nodes: No supraclavicular, axillary, internal mammary, mediastinal, or hilar adenopathy.    Esophagus: Unremarkable.    Pleura: No effusion, thickening, or calcification.    Upper abdomen: There is a questionable fluid attenuation lesion in the left upper quadrant, incompletely evaluated, but measuring up to 5.3 x 4.3 cm on this exam (series 2, image 149).    Body wall: Unremarkable.    Airways: Unremarkable.    Lungs: A small right pneumothorax is present, stable to slightly improved when compared to 12/02/2024.  Innumerable thin walled cysts are seen throughout both lungs.    Bones: Unremarkable.    Impression  1. Small right pneumothorax, not substantially changed when compared to 12/02/2023.  2. Innumerable thin walled cysts seen throughout both lungs.  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  3. Fluid attenuation lesion in the left upper quadrant, incompletely evaluated on this exam, but measuring up to 5.3 cm.  In light of the lung findings, this may represent a lymphangioleiomyoma.  A CT of the abdomen and pelvis contrast is recommended for further evaluation.      Electronically signed by: Vanessa Gray  Date:    12/03/2024  Time:    11:41        Cardiodiagnostics:  Results for orders placed during the hospital encounter of 12/07/24    Echo    Interpretation Summary    Left Ventricle: The left ventricle is normal in size. Normal wall thickness. Septal motion is abnormal. There is normal systolic function with a visually estimated ejection fraction of 55 - 60%. There is normal diastolic function. Normal left ventricular filling pressure.    Right Ventricle: Normal right ventricular cavity size. Right ventricle wall motion  is normal. Systolic function is normal.    Left Atrium: Left atrium is mildly dilated.    Mitral Valve: There is trivial-mild regurgitation.    Tricuspid Valve: There is trace regurgitation.    IVC/SVC: Intermediate venous pressure at 8 mmHg.        Assessment:  1. Lung transplant candidate    2. Lymphangioleiomyomatosis    3. Chronic respiratory failure with hypoxia    4. Abdominal mass, left upper quadrant    5. High risk medication use        Plan:   Seen as an inpatient for newly diagnosed HEBERT with spontaneous RIGHT sided pneumothorax.  S/p VATs/pleurodesis.  Today is second ALD visit.  CT imaging consistent with HEBERT with innumerable thin walled cysts throughout all lung fields.  Currently on sirolimus 1mg.  Diagnosis made by imaging and history.  Would not pursue lung biopsy given her hx of PTX  CBC, CMP, UA, lipid panel, and urine pregnancy test reviewed.  Sirolimus level 5.2 (goal 5-10).      Increase exercise as tolerated.      MRI abdomen to further assess renal mass which is likely a angioleiomyolipoma  Prescribed oxygen at 2L with  exertion.  Will follow up 6MWT from [facility] as she states that now that she feels better she no longer desaturates with exertion.    Scheduled for MRI for abdominal mass likely related to HEBERT  Continue to monitor sirolimus levels with routine labs, lipids, UA, and pregnancy tests per protocols.  Has IUD in place.    Follow up in 3 months with PFTs and 6MWT.  Repeat labs in 1 month to monitor sirolimus.        Kesha Brandon D.O.  Pulmonary/Critical Care and Lung Transplantation  [facility] [place]

## 2025-01-28 NOTE — LETTER
January 28, 2025        Becky Herzog  [address] [place] [address]  Phone: [phone]  Fax: [phone]             [facility] Transplant 1st Fl  [address]  [facility] [address]  Phone: [phone]   Patient: Ann-Marie Saavedra   MR Number: [id number]   YOB: 1980   Date of Visit: 1/28/2025       Dear Dr. Becky Herzog    Thank you for referring Ann-Marie Saavedra to me for evaluation. Attached you will find relevant portions of my assessment and plan of care.    If you have questions, please do not hesitate to call me. I look forward to following Ann-Marie Saavedra along with you.    Sincerely,    Kesha Brandon, DO    Enclosure    If you would like to receive this communication electronically, please contact [email or web address] or [phone] to request [employer] access.    [employer] is a tool which provides read-only access to select patient information with whom you have a relationship. Its easy to use and provides real time access to review your patients record including encounter summaries, notes, results, and demographic information.    If you feel you have received this communication in error or would no longer like to receive these types of communications, please e-mail [email or web address]

## 2025-01-29 ENCOUNTER — PATIENT MESSAGE (OUTPATIENT)
Dept: TRANSPLANT | Facility: CLINIC | Age: 45
End: 2025-01-29
Payer: COMMERCIAL

## 2025-02-03 DIAGNOSIS — Z79.899 HIGH RISK MEDICATION USE: ICD-10-CM

## 2025-02-03 DIAGNOSIS — J84.81 LYMPHANGIOLEIOMYOMATOSIS: Primary | ICD-10-CM

## 2025-02-11 ENCOUNTER — TELEPHONE (OUTPATIENT)
Dept: TRANSPLANT | Facility: CLINIC | Age: 45
End: 2025-02-11
Payer: COMMERCIAL

## 2025-02-11 NOTE — TELEPHONE ENCOUNTER
----- Message from Tacho Aceves sent at 2/11/2025 10:29 AM CST -----  Good morning,    Can you please give me a status of patient.  I see that she is deferred and [facility]  needs a status of patient's evaluation.    Thank you  Ketan Titus    Discussed treatment plan with Dr. Brandon. Instructions received from Dr. Brandon for a lung transplant evaluation. Patient still had a pneumothorax on her repeat CT of the chest that was done in [place] last week. Dr. Brandon spoke with Dr. Owens in [place]. The plan was for patient to proceed with the lung transplant evaluation. Informed Dr. Brandon that I would contact patient to discuss proceeding with the lung transplant evaluation and to determine if she plans to stay here or to go to [place]. Instructions received from Dr. Brandon to transition patient to ALD Clinic if she decides to pursue lung transplantation in [place].     Attempted to contact patient to notify her of Dr. Brandon's instructions and to obtain patient's decision. No answer. Left a message requesting that she return my call.    2/12/25 - Contacted patient. Informed her that her insurance is inquiring on her lung transplant status. Informed patient that Dr. Brandon spoke to Dr. Owens and she agrees that she should proceed with the lung transplant evaluation testing since she still has the pneumothorax. Inquired if she planned on proceeding with the evaluation testing and lung transplant with [place] or here. Patient stated that she plans to stay with [place]. Informed patient that her lung transplant episode will be closed here and she will be transitioned to our advanced lung disease clinic. Informed her that Dr. Brandon can continue to manage her lung disease. Patient verbalized her understanding and agreed with the plan. She also mentioned that she saw a HEBERT specialist yesterday in [place]. She recommended that her sirolimus level be drawn earlier in the morning. Patient asked  that we change the time of her scheduled lab on 2/17/25 to 0800. Informed her that the appointment time will be changed. She again verbalized her understanding.    Message sent to Ketan Titus and Muriel Mendez informing them that the patient's lung transplant episode will be closed.    Lung transplant episode closed. Transitioned to ALD Clinic.

## 2025-02-17 ENCOUNTER — LAB VISIT (OUTPATIENT)
Dept: LAB | Facility: OTHER | Age: 45
End: 2025-02-17
Attending: INTERNAL MEDICINE
Payer: COMMERCIAL

## 2025-02-17 DIAGNOSIS — J84.81 LYMPHANGIOLEIOMYOMATOSIS: ICD-10-CM

## 2025-02-17 DIAGNOSIS — Z79.899 HIGH RISK MEDICATION USE: ICD-10-CM

## 2025-02-17 LAB
ALBUMIN SERPL BCP-MCNC: 4.6 G/DL (ref 3.5–5.2)
ALP SERPL-CCNC: 66 U/L (ref 40–150)
ALT SERPL W/O P-5'-P-CCNC: 20 U/L (ref 10–44)
ANION GAP SERPL CALC-SCNC: 8 MMOL/L (ref 8–16)
AST SERPL-CCNC: 18 U/L (ref 10–40)
B-HCG UR QL: NEGATIVE
BACTERIA #/AREA URNS HPF: NORMAL /HPF
BASOPHILS # BLD AUTO: 0.04 K/UL (ref 0–0.2)
BASOPHILS NFR BLD: 0.9 % (ref 0–1.9)
BILIRUB SERPL-MCNC: 0.5 MG/DL (ref 0.1–1)
BILIRUB UR QL STRIP: NEGATIVE
BUN SERPL-MCNC: 15 MG/DL (ref 6–20)
CALCIUM SERPL-MCNC: 9.8 MG/DL (ref 8.7–10.5)
CHLORIDE SERPL-SCNC: 111 MMOL/L (ref 95–110)
CHOLEST SERPL-MCNC: 181 MG/DL (ref 120–199)
CHOLEST/HDLC SERPL: 2.7 {RATIO} (ref 2–5)
CLARITY UR: CLEAR
CO2 SERPL-SCNC: 23 MMOL/L (ref 23–29)
COLOR UR: COLORLESS
CREAT SERPL-MCNC: 0.8 MG/DL (ref 0.5–1.4)
DIFFERENTIAL METHOD BLD: ABNORMAL
EOSINOPHIL # BLD AUTO: 0.2 K/UL (ref 0–0.5)
EOSINOPHIL NFR BLD: 4.4 % (ref 0–8)
ERYTHROCYTE [DISTWIDTH] IN BLOOD BY AUTOMATED COUNT: 11.6 % (ref 11.5–14.5)
EST. GFR  (NO RACE VARIABLE): >60 ML/MIN/1.73 M^2
GLUCOSE SERPL-MCNC: 64 MG/DL (ref 70–110)
GLUCOSE UR QL STRIP: NEGATIVE
HCT VFR BLD AUTO: 48.7 % (ref 37–48.5)
HDLC SERPL-MCNC: 68 MG/DL (ref 40–75)
HDLC SERPL: 37.6 % (ref 20–50)
HGB BLD-MCNC: 15.9 G/DL (ref 12–16)
HGB UR QL STRIP: ABNORMAL
IMM GRANULOCYTES # BLD AUTO: 0.01 K/UL (ref 0–0.04)
IMM GRANULOCYTES NFR BLD AUTO: 0.2 % (ref 0–0.5)
KETONES UR QL STRIP: NEGATIVE
LDLC SERPL CALC-MCNC: 98.4 MG/DL (ref 63–159)
LEUKOCYTE ESTERASE UR QL STRIP: NEGATIVE
LYMPHOCYTES # BLD AUTO: 1.2 K/UL (ref 1–4.8)
LYMPHOCYTES NFR BLD: 27.8 % (ref 18–48)
MCH RBC QN AUTO: 29 PG (ref 27–31)
MCHC RBC AUTO-ENTMCNC: 32.6 G/DL (ref 32–36)
MCV RBC AUTO: 89 FL (ref 82–98)
MICROSCOPIC COMMENT: NORMAL
MONOCYTES # BLD AUTO: 0.4 K/UL (ref 0.3–1)
MONOCYTES NFR BLD: 8.1 % (ref 4–15)
NEUTROPHILS # BLD AUTO: 2.5 K/UL (ref 1.8–7.7)
NEUTROPHILS NFR BLD: 58.6 % (ref 38–73)
NITRITE UR QL STRIP: NEGATIVE
NONHDLC SERPL-MCNC: 113 MG/DL
NRBC BLD-RTO: 0 /100 WBC
PH UR STRIP: 5 [PH] (ref 5–8)
PLATELET # BLD AUTO: 224 K/UL (ref 150–450)
PMV BLD AUTO: 9.7 FL (ref 9.2–12.9)
POTASSIUM SERPL-SCNC: 4.8 MMOL/L (ref 3.5–5.1)
PROT SERPL-MCNC: 7.8 G/DL (ref 6–8.4)
PROT UR QL STRIP: NEGATIVE
RBC # BLD AUTO: 5.49 M/UL (ref 4–5.4)
RBC #/AREA URNS HPF: 1 /HPF (ref 0–4)
SODIUM SERPL-SCNC: 142 MMOL/L (ref 136–145)
SP GR UR STRIP: 1.01 (ref 1–1.03)
SQUAMOUS #/AREA URNS HPF: 3 /HPF
TRIGL SERPL-MCNC: 73 MG/DL (ref 30–150)
URN SPEC COLLECT METH UR: ABNORMAL
UROBILINOGEN UR STRIP-ACNC: NEGATIVE EU/DL
WBC # BLD AUTO: 4.32 K/UL (ref 3.9–12.7)
WBC #/AREA URNS HPF: 3 /HPF (ref 0–5)

## 2025-02-17 PROCEDURE — 80053 COMPREHEN METABOLIC PANEL: CPT | Mod: TXP | Performed by: INTERNAL MEDICINE

## 2025-02-17 PROCEDURE — 81025 URINE PREGNANCY TEST: CPT | Mod: TXP | Performed by: INTERNAL MEDICINE

## 2025-02-17 PROCEDURE — 81000 URINALYSIS NONAUTO W/SCOPE: CPT | Mod: TXP | Performed by: INTERNAL MEDICINE

## 2025-02-17 PROCEDURE — 36415 COLL VENOUS BLD VENIPUNCTURE: CPT | Mod: TXP | Performed by: INTERNAL MEDICINE

## 2025-02-17 PROCEDURE — 80195 ASSAY OF SIROLIMUS: CPT | Mod: TXP | Performed by: INTERNAL MEDICINE

## 2025-02-17 PROCEDURE — 85025 COMPLETE CBC W/AUTO DIFF WBC: CPT | Mod: TXP | Performed by: INTERNAL MEDICINE

## 2025-02-17 PROCEDURE — 80061 LIPID PANEL: CPT | Mod: TXP | Performed by: INTERNAL MEDICINE

## 2025-02-18 LAB — SIROLIMUS BLD-MCNC: 4 NG/ML (ref 4–20)

## 2025-02-19 ENCOUNTER — PATIENT MESSAGE (OUTPATIENT)
Dept: INTERNAL MEDICINE | Facility: CLINIC | Age: 45
End: 2025-02-19
Payer: COMMERCIAL

## 2025-02-19 DIAGNOSIS — J84.81 PULMONARY LYMPHANGIOLEIOMYOMATOSIS: Primary | ICD-10-CM

## 2025-02-20 ENCOUNTER — TELEPHONE (OUTPATIENT)
Dept: TRANSPLANT | Facility: CLINIC | Age: 45
End: 2025-02-20
Payer: COMMERCIAL

## 2025-02-20 ENCOUNTER — PATIENT MESSAGE (OUTPATIENT)
Dept: TRANSPLANT | Facility: CLINIC | Age: 45
End: 2025-02-20
Payer: COMMERCIAL

## 2025-02-20 DIAGNOSIS — J84.81 LYMPHANGIOLEIOMYOMATOSIS: ICD-10-CM

## 2025-02-20 RX ORDER — SIROLIMUS 1 MG/1
2 TABLET, FILM COATED ORAL DAILY
Qty: 60 TABLET | Refills: 11 | Status: SHIPPED | OUTPATIENT
Start: 2025-02-20 | End: 2026-02-20

## 2025-02-20 RX ORDER — SIROLIMUS 1 MG/1
2 TABLET, FILM COATED ORAL DAILY
Qty: 60 TABLET | Refills: 11 | Status: SHIPPED | OUTPATIENT
Start: 2025-02-20 | End: 2025-02-20 | Stop reason: SDUPTHER

## 2025-02-20 NOTE — TELEPHONE ENCOUNTER
"Faxed referral to cardiology with Dr. Gypsy Arias twice to:    [facility]  [address] [place] [address]  Phone: [phone]  Fax: [phone]    Your fax has been successfully sent to [phone] at [phone].  ------------------------------------------------------------  From: [id number]  ------------------------------------------------------------  2/20/2025 11:33:33 AM Transmission Record          Sent to [phone] with remote ID "[email or web address]                 "          Result: (0/339;0/0) Success          Page record: 1 - 4          Elapsed time: 01:47 on channel 49   "

## 2025-02-20 NOTE — TELEPHONE ENCOUNTER
Orders submitted/scheduled in one week. [facility] message sent.  ----- Message from Kesha Brandon DO sent at 2/20/2025 10:18 AM CST -----  CMP, CBC, and sirolimus level in 1 week.  Thanks.  ----- Message -----  From: Kaylen Simon  Sent: 2/20/2025  10:15 AM CST  To: Kesha Brandon, DO    I have left her a message /routed a [facility] message to the patient. When  do you want her to repeat labs? Just sirolimus level?  ----- Message -----  From: Kesha Brandon DO  Sent: 2/20/2025   8:42 AM CST  To: Kaylen Simon    Increase sirolimus to 2mg daily.  Did she decide to go with [facility] for transplant??  ----- Message -----  From: Kaylen Simon  Sent: 2/19/2025   4:43 PM CST  To: Kesha Brandon DO    1 mg daily.  ----- Message -----  From: Kesha Brandon DO  Sent: 2/19/2025   9:24 AM CST  To: Petty Andersen RN    What's her current dose?  ----- Message -----  From: Petty Andersen RN  Sent: 2/18/2025   8:40 PM CST  To: Kesha Brandon, DO    Any changes to sirolimus?

## 2025-02-27 ENCOUNTER — LAB VISIT (OUTPATIENT)
Dept: LAB | Facility: OTHER | Age: 45
End: 2025-02-27
Attending: INTERNAL MEDICINE
Payer: COMMERCIAL

## 2025-02-27 DIAGNOSIS — J84.81 LYMPHANGIOLEIOMYOMATOSIS: ICD-10-CM

## 2025-02-27 DIAGNOSIS — Z79.899 HIGH RISK MEDICATION USE: ICD-10-CM

## 2025-02-27 LAB
ALBUMIN SERPL BCP-MCNC: 4.5 G/DL (ref 3.5–5.2)
ALP SERPL-CCNC: 58 U/L (ref 40–150)
ALT SERPL W/O P-5'-P-CCNC: 18 U/L (ref 10–44)
ANION GAP SERPL CALC-SCNC: 7 MMOL/L (ref 8–16)
AST SERPL-CCNC: 21 U/L (ref 10–40)
BASOPHILS # BLD AUTO: 0.03 K/UL (ref 0–0.2)
BASOPHILS NFR BLD: 0.9 % (ref 0–1.9)
BILIRUB SERPL-MCNC: 0.7 MG/DL (ref 0.1–1)
BUN SERPL-MCNC: 16 MG/DL (ref 6–20)
CALCIUM SERPL-MCNC: 9.4 MG/DL (ref 8.7–10.5)
CHLORIDE SERPL-SCNC: 111 MMOL/L (ref 95–110)
CO2 SERPL-SCNC: 22 MMOL/L (ref 23–29)
CREAT SERPL-MCNC: 0.7 MG/DL (ref 0.5–1.4)
DIFFERENTIAL METHOD BLD: ABNORMAL
EOSINOPHIL # BLD AUTO: 0.1 K/UL (ref 0–0.5)
EOSINOPHIL NFR BLD: 3.5 % (ref 0–8)
ERYTHROCYTE [DISTWIDTH] IN BLOOD BY AUTOMATED COUNT: 11.6 % (ref 11.5–14.5)
EST. GFR  (NO RACE VARIABLE): >60 ML/MIN/1.73 M^2
GLUCOSE SERPL-MCNC: 72 MG/DL (ref 70–110)
HCT VFR BLD AUTO: 47.1 % (ref 37–48.5)
HGB BLD-MCNC: 15.3 G/DL (ref 12–16)
IMM GRANULOCYTES # BLD AUTO: 0.01 K/UL (ref 0–0.04)
IMM GRANULOCYTES NFR BLD AUTO: 0.3 % (ref 0–0.5)
LYMPHOCYTES # BLD AUTO: 1.1 K/UL (ref 1–4.8)
LYMPHOCYTES NFR BLD: 32 % (ref 18–48)
MCH RBC QN AUTO: 28.6 PG (ref 27–31)
MCHC RBC AUTO-ENTMCNC: 32.5 G/DL (ref 32–36)
MCV RBC AUTO: 88 FL (ref 82–98)
MONOCYTES # BLD AUTO: 0.4 K/UL (ref 0.3–1)
MONOCYTES NFR BLD: 11.7 % (ref 4–15)
NEUTROPHILS # BLD AUTO: 1.8 K/UL (ref 1.8–7.7)
NEUTROPHILS NFR BLD: 51.6 % (ref 38–73)
NRBC BLD-RTO: 0 /100 WBC
PLATELET # BLD AUTO: 230 K/UL (ref 150–450)
PMV BLD AUTO: 10.1 FL (ref 9.2–12.9)
POTASSIUM SERPL-SCNC: 4.5 MMOL/L (ref 3.5–5.1)
PROT SERPL-MCNC: 7.7 G/DL (ref 6–8.4)
RBC # BLD AUTO: 5.35 M/UL (ref 4–5.4)
SODIUM SERPL-SCNC: 140 MMOL/L (ref 136–145)
WBC # BLD AUTO: 3.41 K/UL (ref 3.9–12.7)

## 2025-02-27 PROCEDURE — 80195 ASSAY OF SIROLIMUS: CPT | Mod: TXP | Performed by: INTERNAL MEDICINE

## 2025-02-27 PROCEDURE — 36415 COLL VENOUS BLD VENIPUNCTURE: CPT | Mod: TXP | Performed by: INTERNAL MEDICINE

## 2025-02-27 PROCEDURE — 85025 COMPLETE CBC W/AUTO DIFF WBC: CPT | Mod: TXP | Performed by: INTERNAL MEDICINE

## 2025-02-27 PROCEDURE — 80053 COMPREHEN METABOLIC PANEL: CPT | Mod: TXP | Performed by: INTERNAL MEDICINE

## 2025-02-28 ENCOUNTER — PATIENT MESSAGE (OUTPATIENT)
Dept: TRANSPLANT | Facility: CLINIC | Age: 45
End: 2025-02-28
Payer: COMMERCIAL

## 2025-02-28 ENCOUNTER — RESULTS FOLLOW-UP (OUTPATIENT)
Dept: TRANSPLANT | Facility: HOSPITAL | Age: 45
End: 2025-02-28
Payer: COMMERCIAL

## 2025-02-28 LAB — SIROLIMUS BLD-MCNC: 6.5 NG/ML (ref 4–20)

## 2025-02-28 NOTE — TELEPHONE ENCOUNTER
----- Message from Kesha Brandon DO sent at 2/28/2025  2:12 PM CST -----    No changes to sirolimus    ----- Message -----  From: [place], Soft Lab Interface  Sent: 2/27/2025  10:10 AM CST  To: Kesha Brandon DO

## 2025-03-13 ENCOUNTER — OFFICE VISIT (OUTPATIENT)
Dept: OBSTETRICS AND GYNECOLOGY | Facility: CLINIC | Age: 45
End: 2025-03-13
Payer: COMMERCIAL

## 2025-03-13 VITALS — BODY MASS INDEX: 18.97 KG/M2 | SYSTOLIC BLOOD PRESSURE: 102 MMHG | WEIGHT: 117.5 LBS | DIASTOLIC BLOOD PRESSURE: 68 MMHG

## 2025-03-13 DIAGNOSIS — Z11.51 ENCOUNTER FOR SCREENING FOR HUMAN PAPILLOMAVIRUS (HPV): ICD-10-CM

## 2025-03-13 DIAGNOSIS — Z01.419 ENCOUNTER FOR GYNECOLOGICAL EXAMINATION: Primary | ICD-10-CM

## 2025-03-13 DIAGNOSIS — Z12.4 PAP SMEAR FOR CERVICAL CANCER SCREENING: ICD-10-CM

## 2025-03-13 DIAGNOSIS — Z30.431 IUD CHECK UP: ICD-10-CM

## 2025-03-13 PROCEDURE — 99999 PR PBB SHADOW E&M-EST. PATIENT-LVL III: CPT | Mod: PBBFAC,,, | Performed by: OBSTETRICS & GYNECOLOGY

## 2025-03-13 NOTE — PROGRESS NOTES
HPI: Pt is a 44 y.o.  female who presents for routine annual exam. She uses Mirena for contraception which was placed 1/30/2024. She is doing well with this.     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 She was experiencing worsening SOB with exertion over time and this was discovered on workup. She is now being worked up to be placed on the lung transplant list. Having everything done at [facility]. Is on Siromilus right now to help prevent the progression of disease while awaiting transplant.     Last pap/HPV 1/30/2024 Unsatisfactory, HPV negative.   MMG 5/8/2024 NORMAL T-C 14.89%      ROS:  GENERAL: Feeling well overall. Denies fever or chills.   SKIN: Denies rash or lesions.   HEAD: Denies head injury or headache.   NODES: Denies enlarged lymph nodes.   CHEST: Denies chest pain or shortness of breath.   CARDIOVASCULAR: Denies palpitations or left sided chest pain.   ABDOMEN: No abdominal pain, constipation, diarrhea, nausea or vomiting   URINARY: No dysuria, hematuria, or burning on urination.  REPRODUCTIVE: See HPI.   BREASTS: Denies pain, lumps, or nipple discharge.   HEMATOLOGIC: No easy bruisability or excessive bleeding.   MUSCULOSKELETAL: Denies joint pain or swelling.   NEUROLOGIC: Denies syncope or weakness.   PSYCHIATRIC: Denies acute depression or anxiety     PE:   APPEARANCE: Well nourished, well developed, friendly White female in no acute distress.  NODES: no inguinal lymphadenopathy  BREASTS: Symmetrical, no skin changes or visible lesions. No palpable masses, nipple discharge or adenopathy bilaterally.  ABDOMEN: Soft. No tenderness or masses. No distention.   VULVA: No lesions. Normal external female genitalia.  URETHRAL MEATUS: Normal size and location, no lesions, no prolapse.  URETHRA: No masses, tenderness, or prolapse.  VAGINA: Moist. No lesions or lacerations noted. No abnormal discharge present. No odor present.   CERVIX: No lesions or discharge. No  cervical motion tenderness. Strings seen 1 cm out.   UTERUS: Normal size, regular shape, mobile, non-tender.  ADNEXA: No tenderness. No fullness or masses palpated in the adnexal regions.   ANUS PERINEUM: Normal.        Diagnosis:  1. Encounter for gynecological examination    2. Pap smear for cervical cancer screening    3. Encounter for screening for human papillomavirus (HPV)    4. IUD check up          Plan:     Orders Placed This Encounter    HPV High Risk Genotypes, PCR    Liquid-Based Pap Smear, Screening     - MMG scheduled 4/30/2025  - Pap/HPV updated today. Will need them YEARLY as she is on Immunosuppressant medication.   - Colon cancer screening at age 47.     Patient was counseled today on the new ACS guidelines for cervical cytology screening as well as the current recommendations for breast cancer screening. She was counseled to follow up with her PCP for other routine health maintenance.     Follow-up with me in 1 year for routine exam; paps YEARLY

## 2025-03-14 ENCOUNTER — PATIENT MESSAGE (OUTPATIENT)
Dept: INTERNAL MEDICINE | Facility: CLINIC | Age: 45
End: 2025-03-14
Payer: COMMERCIAL

## 2025-03-17 ENCOUNTER — RESULTS FOLLOW-UP (OUTPATIENT)
Dept: OBSTETRICS AND GYNECOLOGY | Facility: CLINIC | Age: 45
End: 2025-03-17

## 2025-03-24 ENCOUNTER — PATIENT MESSAGE (OUTPATIENT)
Dept: INTERNAL MEDICINE | Facility: CLINIC | Age: 45
End: 2025-03-24
Payer: COMMERCIAL

## 2025-03-24 ENCOUNTER — PATIENT MESSAGE (OUTPATIENT)
Dept: TRANSPLANT | Facility: CLINIC | Age: 45
End: 2025-03-24
Payer: COMMERCIAL

## 2025-03-24 DIAGNOSIS — Z79.899 HIGH RISK MEDICATION USE: ICD-10-CM

## 2025-03-24 DIAGNOSIS — J84.81 LYMPHANGIOLEIOMYOMATOSIS: Primary | ICD-10-CM

## 2025-03-24 DIAGNOSIS — J84.81 LYMPHANGIOLEIOMYOMATOSIS: ICD-10-CM

## 2025-03-24 DIAGNOSIS — J84.81 PULMONARY LYMPHANGIOLEIOMYOMATOSIS: Primary | ICD-10-CM

## 2025-03-25 NOTE — TELEPHONE ENCOUNTER
Received the below message from patient via [facility]:    Also, when I went to  my Sirolimus prescription at [facility] yesterday, it stated 1mg tablet per day instead of 2mg per day, and it was only for 30 pills. Can you please update this with the pharmacy please? thanks     Prescription to increase the sirolimus was sent electronically on 2/20/25. Contacted [facility]. Informed that they did receive the prescription for sirolimus 2mg daily, but they also still have the 1 mg prescription on her profile as well. Informed that the sirolimus prescription for the 1 mg daily was scheduled for auto-refill, so that prescription was refilled. Prescription for sirolimus 1 mg daily discontinued.    Message sent to patient informing her of above.

## 2025-03-25 NOTE — TELEPHONE ENCOUNTER
Please let patient know I placed urgent order for colonoscopy, they should contact her to assist with scheduling. Referral placed in case [facility] can't get her in soon, please fax to [facility] and provide her the phone number to schedule. Thank you!

## 2025-03-26 ENCOUNTER — LAB VISIT (OUTPATIENT)
Dept: LAB | Facility: OTHER | Age: 45
End: 2025-03-26
Attending: INTERNAL MEDICINE
Payer: COMMERCIAL

## 2025-03-26 ENCOUNTER — TELEPHONE (OUTPATIENT)
Dept: ENDOSCOPY | Facility: HOSPITAL | Age: 45
End: 2025-03-26
Payer: COMMERCIAL

## 2025-03-26 VITALS — HEIGHT: 66 IN | WEIGHT: 117 LBS | BODY MASS INDEX: 18.8 KG/M2

## 2025-03-26 DIAGNOSIS — J84.81 LYMPHANGIOLEIOMYOMATOSIS: Primary | ICD-10-CM

## 2025-03-26 DIAGNOSIS — J84.81 PULMONARY LYMPHANGIOLEIOMYOMATOSIS: Primary | ICD-10-CM

## 2025-03-26 DIAGNOSIS — J84.81 LYMPHANGIOLEIOMYOMATOSIS: ICD-10-CM

## 2025-03-26 DIAGNOSIS — Z12.11 SPECIAL SCREENING FOR MALIGNANT NEOPLASMS, COLON: Primary | ICD-10-CM

## 2025-03-26 DIAGNOSIS — Z79.899 HIGH RISK MEDICATION USE: ICD-10-CM

## 2025-03-26 LAB
ABSOLUTE EOSINOPHIL (OHS): 0.05 K/UL
ABSOLUTE MONOCYTE (OHS): 0.46 K/UL (ref 0.3–1)
ABSOLUTE NEUTROPHIL COUNT (OHS): 4.1 K/UL (ref 1.8–7.7)
ALBUMIN SERPL BCP-MCNC: 4.3 G/DL (ref 3.5–5.2)
ALP SERPL-CCNC: 59 UNIT/L (ref 40–150)
ALT SERPL W/O P-5'-P-CCNC: 15 UNIT/L (ref 10–44)
ANION GAP (OHS): 10 MMOL/L (ref 8–16)
AST SERPL-CCNC: 20 UNIT/L (ref 11–45)
BASOPHILS # BLD AUTO: 0.02 K/UL
BASOPHILS NFR BLD AUTO: 0.3 %
BILIRUB SERPL-MCNC: 0.5 MG/DL (ref 0.1–1)
BILIRUB UR QL STRIP.AUTO: NEGATIVE
BUN SERPL-MCNC: 13 MG/DL (ref 6–20)
CALCIUM SERPL-MCNC: 8.7 MG/DL (ref 8.7–10.5)
CHLORIDE SERPL-SCNC: 107 MMOL/L (ref 95–110)
CHOLEST SERPL-MCNC: 185 MG/DL (ref 120–199)
CHOLEST/HDLC SERPL: 2.4 {RATIO} (ref 2–5)
CLARITY UR: CLEAR
CO2 SERPL-SCNC: 19 MMOL/L (ref 23–29)
COLOR UR AUTO: YELLOW
CREAT SERPL-MCNC: 0.7 MG/DL (ref 0.5–1.4)
ERYTHROCYTE [DISTWIDTH] IN BLOOD BY AUTOMATED COUNT: 11.9 % (ref 11.5–14.5)
GFR SERPLBLD CREATININE-BSD FMLA CKD-EPI: >60 ML/MIN/1.73/M2
GLUCOSE SERPL-MCNC: 75 MG/DL (ref 70–110)
GLUCOSE UR QL STRIP: NEGATIVE
HCT VFR BLD AUTO: 42.7 % (ref 37–48.5)
HDLC SERPL-MCNC: 77 MG/DL (ref 40–75)
HDLC SERPL: 41.6 % (ref 20–50)
HGB BLD-MCNC: 13.8 GM/DL (ref 12–16)
HGB UR QL STRIP: NEGATIVE
IMM GRANULOCYTES # BLD AUTO: 0.03 K/UL (ref 0–0.04)
IMM GRANULOCYTES NFR BLD AUTO: 0.5 % (ref 0–0.5)
KETONES UR QL STRIP: NEGATIVE
LDLC SERPL CALC-MCNC: 91.6 MG/DL (ref 63–159)
LEUKOCYTE ESTERASE UR QL STRIP: NEGATIVE
LYMPHOCYTES # BLD AUTO: 1.08 K/UL (ref 1–4.8)
MCH RBC QN AUTO: 28.1 PG (ref 27–50)
MCHC RBC AUTO-ENTMCNC: 32.3 G/DL (ref 32–36)
MCV RBC AUTO: 87 FL (ref 82–98)
NITRITE UR QL STRIP: NEGATIVE
NONHDLC SERPL-MCNC: 108 MG/DL
NUCLEATED RBC (/100WBC) (OHS): 0 /100 WBC
PH UR STRIP: 7 [PH]
PLATELET # BLD AUTO: 238 K/UL (ref 150–450)
PMV BLD AUTO: 9.9 FL (ref 9.2–12.9)
POTASSIUM SERPL-SCNC: 3.6 MMOL/L (ref 3.5–5.1)
PROT SERPL-MCNC: 7.5 GM/DL (ref 6–8.4)
PROT UR QL STRIP: NEGATIVE
RBC # BLD AUTO: 4.91 M/UL (ref 4–5.4)
RELATIVE EOSINOPHIL (OHS): 0.9 %
RELATIVE LYMPHOCYTE (OHS): 18.8 % (ref 18–48)
RELATIVE MONOCYTE (OHS): 8 % (ref 4–15)
RELATIVE NEUTROPHIL (OHS): 71.5 % (ref 38–73)
SODIUM SERPL-SCNC: 136 MMOL/L (ref 136–145)
SP GR UR STRIP: 1.02
TRIGL SERPL-MCNC: 82 MG/DL (ref 30–150)
UROBILINOGEN UR STRIP-ACNC: NEGATIVE EU/DL
WBC # BLD AUTO: 5.74 K/UL (ref 3.9–12.7)

## 2025-03-26 PROCEDURE — 36415 COLL VENOUS BLD VENIPUNCTURE: CPT | Mod: TXP

## 2025-03-26 PROCEDURE — 80195 ASSAY OF SIROLIMUS: CPT | Mod: TXP

## 2025-03-26 PROCEDURE — 85025 COMPLETE CBC W/AUTO DIFF WBC: CPT | Mod: TXP

## 2025-03-26 PROCEDURE — 80061 LIPID PANEL: CPT | Mod: TXP

## 2025-03-26 PROCEDURE — 81003 URINALYSIS AUTO W/O SCOPE: CPT | Mod: TXP

## 2025-03-26 PROCEDURE — 84075 ASSAY ALKALINE PHOSPHATASE: CPT | Mod: TXP

## 2025-03-26 NOTE — TELEPHONE ENCOUNTER
Referral for procedure from  Workqueue referral (see Appts tab)      Spoke to patient to schedule procedure(s) Colonoscopy       Physician to perform procedure(s) Dr. BONY Nur  Date of Procedure (s) 04/01/2025  Arrival Time 2:00 PM  Time of Procedure(s) 3:00 PM   Location of Procedure(s) [facility] Floor  Type of Rx Prep sent to patient: PEG  Instructions provided to patient via [facility]    Patient was informed on the following information and verbalized understanding. Screening questionnaire reviewed with patient and complete. If procedure requires anesthesia, a responsible adult needs to be present to accompany the patient home, patient cannot drive after receiving anesthesia. Appointment details are tentative, especially check-in time. Patient will receive a prep-op call 7 days prior to confirm check-in time for procedure. If applicable the patient should contact their pharmacy to verify Rx for procedure prep is ready for pick-up. Patient was advised to call the scheduling department at [phone] if pharmacy states no Rx is available. Patient was advised to call the endoscopy scheduling department if any questions or concerns arise.      [facility] Scheduling Department

## 2025-03-26 NOTE — PROGRESS NOTES
Contacted [facility]. Sirolimus level added on to lab from today per Dr. Brandon's instructions.

## 2025-03-27 ENCOUNTER — RESULTS FOLLOW-UP (OUTPATIENT)
Dept: TRANSPLANT | Facility: CLINIC | Age: 45
End: 2025-03-27

## 2025-03-27 ENCOUNTER — TELEPHONE (OUTPATIENT)
Dept: TRANSPLANT | Facility: CLINIC | Age: 45
End: 2025-03-27
Payer: COMMERCIAL

## 2025-03-27 LAB — SIROLIMUS BLD-MCNC: 4.1 NG/ML (ref 4–20)

## 2025-03-27 NOTE — TELEPHONE ENCOUNTER
Received the below instructions from Dr. Brandon:    No changes Sirolimus     Message sent to patient via [facility] with Dr. Brandon's instructions.

## 2025-03-31 ENCOUNTER — PATIENT MESSAGE (OUTPATIENT)
Dept: TRANSPLANT | Facility: CLINIC | Age: 45
End: 2025-03-31
Payer: COMMERCIAL

## 2025-04-01 ENCOUNTER — ANESTHESIA EVENT (OUTPATIENT)
Dept: ENDOSCOPY | Facility: HOSPITAL | Age: 45
End: 2025-04-01
Payer: COMMERCIAL

## 2025-04-01 ENCOUNTER — ANESTHESIA (OUTPATIENT)
Dept: ENDOSCOPY | Facility: HOSPITAL | Age: 45
End: 2025-04-01
Payer: COMMERCIAL

## 2025-04-01 ENCOUNTER — HOSPITAL ENCOUNTER (OUTPATIENT)
Facility: HOSPITAL | Age: 45
Discharge: HOME | End: 2025-04-01
Attending: INTERNAL MEDICINE | Admitting: INTERNAL MEDICINE
Payer: COMMERCIAL

## 2025-04-01 VITALS
DIASTOLIC BLOOD PRESSURE: 68 MMHG | OXYGEN SATURATION: 100 % | HEIGHT: 66 IN | BODY MASS INDEX: 18.8 KG/M2 | HEART RATE: 90 BPM | WEIGHT: 117 LBS | SYSTOLIC BLOOD PRESSURE: 111 MMHG | TEMPERATURE: 98 F | RESPIRATION RATE: 15 BRPM

## 2025-04-01 DIAGNOSIS — J84.81 LYMPHANGIOLEIOMYOMATOSIS: ICD-10-CM

## 2025-04-01 DIAGNOSIS — J84.81 PULMONARY LYMPHANGIOLEIOMYOMATOSIS: ICD-10-CM

## 2025-04-01 DIAGNOSIS — Z79.899 HIGH RISK MEDICATION USE: ICD-10-CM

## 2025-04-01 DIAGNOSIS — Z83.719 FAMILY HISTORY OF COLONIC POLYPS: ICD-10-CM

## 2025-04-01 LAB
B-HCG UR QL: NEGATIVE
CTP QC/QA: YES

## 2025-04-01 PROCEDURE — 88305 TISSUE EXAM BY PATHOLOGIST: CPT | Mod: TC,TXP | Performed by: INTERNAL MEDICINE

## 2025-04-01 PROCEDURE — 45385 COLONOSCOPY W/LESION REMOVAL: CPT | Mod: 33,TXP | Performed by: INTERNAL MEDICINE

## 2025-04-01 PROCEDURE — 81025 URINE PREGNANCY TEST: CPT | Mod: TXP | Performed by: INTERNAL MEDICINE

## 2025-04-01 PROCEDURE — 27201089 HC SNARE, DISP (ANY): Mod: NTX | Performed by: INTERNAL MEDICINE

## 2025-04-01 PROCEDURE — 25000003 PHARM REV CODE 250: Mod: TXP | Performed by: STUDENT IN AN ORGANIZED HEALTH CARE EDUCATION/TRAINING PROGRAM

## 2025-04-01 PROCEDURE — 27200997: Mod: TXP | Performed by: INTERNAL MEDICINE

## 2025-04-01 PROCEDURE — 37000009 HC ANESTHESIA EA ADD 15 MINS: Mod: TXP | Performed by: INTERNAL MEDICINE

## 2025-04-01 PROCEDURE — 45385 COLONOSCOPY W/LESION REMOVAL: CPT | Mod: 33,NTX,, | Performed by: INTERNAL MEDICINE

## 2025-04-01 PROCEDURE — 37000008 HC ANESTHESIA 1ST 15 MINUTES: Mod: NTX | Performed by: INTERNAL MEDICINE

## 2025-04-01 PROCEDURE — 88305 TISSUE EXAM BY PATHOLOGIST: CPT | Mod: 26,NTX,, | Performed by: STUDENT IN AN ORGANIZED HEALTH CARE EDUCATION/TRAINING PROGRAM

## 2025-04-01 PROCEDURE — 63600175 PHARM REV CODE 636 W HCPCS: Mod: TXP | Performed by: STUDENT IN AN ORGANIZED HEALTH CARE EDUCATION/TRAINING PROGRAM

## 2025-04-01 RX ORDER — SODIUM CHLORIDE 0.9 % (FLUSH) 0.9 %
10 SYRINGE (ML) INJECTION
Status: DISCONTINUED | OUTPATIENT
Start: 2025-04-01 | End: 2025-04-01 | Stop reason: HOSPADM

## 2025-04-01 RX ORDER — PROPOFOL 10 MG/ML
VIAL (ML) INTRAVENOUS
Status: DISCONTINUED | OUTPATIENT
Start: 2025-04-01 | End: 2025-04-01

## 2025-04-01 RX ORDER — ONDANSETRON HYDROCHLORIDE 2 MG/ML
INJECTION, SOLUTION INTRAVENOUS
Status: DISCONTINUED | OUTPATIENT
Start: 2025-04-01 | End: 2025-04-01

## 2025-04-01 RX ORDER — GLUCAGON 1 MG
1 KIT INJECTION
Status: DISCONTINUED | OUTPATIENT
Start: 2025-04-01 | End: 2025-04-01 | Stop reason: HOSPADM

## 2025-04-01 RX ORDER — LIDOCAINE HYDROCHLORIDE 20 MG/ML
INJECTION INTRAVENOUS
Status: DISCONTINUED | OUTPATIENT
Start: 2025-04-01 | End: 2025-04-01

## 2025-04-01 RX ORDER — PROPOFOL 10 MG/ML
VIAL (ML) INTRAVENOUS CONTINUOUS PRN
Status: DISCONTINUED | OUTPATIENT
Start: 2025-04-01 | End: 2025-04-01

## 2025-04-01 RX ORDER — SODIUM CHLORIDE 9 MG/ML
INJECTION, SOLUTION INTRAVENOUS CONTINUOUS
Status: DISCONTINUED | OUTPATIENT
Start: 2025-04-01 | End: 2025-04-01 | Stop reason: HOSPADM

## 2025-04-01 RX ADMIN — LIDOCAINE HYDROCHLORIDE 40 MG: 20 INJECTION INTRAVENOUS at 04:04

## 2025-04-01 RX ADMIN — PROPOFOL 80 MG: 10 INJECTION, EMULSION INTRAVENOUS at 04:04

## 2025-04-01 RX ADMIN — SODIUM CHLORIDE: 9 INJECTION, SOLUTION INTRAVENOUS at 03:04

## 2025-04-01 RX ADMIN — ONDANSETRON 4 MG: 2 INJECTION INTRAMUSCULAR; INTRAVENOUS at 04:04

## 2025-04-01 RX ADMIN — SODIUM CHLORIDE: 9 INJECTION, SOLUTION INTRAVENOUS at 04:04

## 2025-04-01 RX ADMIN — PROPOFOL 200 MCG/KG/MIN: 10 INJECTION, EMULSION INTRAVENOUS at 04:04

## 2025-04-01 NOTE — ANESTHESIA PREPROCEDURE EVALUATION
"                                                                                                             2025  Ann-Marie Saavedra is a 44 y.o., female with past medical history most notable for lymphangioleiomyomatosis (HEBERT) complicated by secondary spontaneous right pneumothorax s/p chest tube and VATS 2024 with mechanical and doxycycline pleurodesis now with persistent right pneumothorax who is referred for possible lung transplant evaluation and here for below    Pre-operative evaluation for Procedure(s) (LRB):  COLONOSCOPY (N/A)    Ann-Marie Saavedra is a 44 y.o. female     Problem List[1]    Review of patient's allergies indicates:  No Known Allergies    Medications Ordered Prior to Encounter[2]    Past Surgical History:   Procedure Laterality Date    CYST REMOVAL Right     chest wall    FOOT TENDON SURGERY Bilateral     bunion    INJECTION OF ANESTHETIC AGENT AROUND MULTIPLE INTERCOSTAL NERVES Right 2024    Procedure: BLOCK, NERVE, INTERCOSTAL, 2 OR MORE;  Surgeon: Gerson Alvarado MD;  Location: [facility] OR [address] [place] [address];  Service: Cardiothoracic;  Laterality: Right;    PLEURODESIS WITH VIDEO-ASSISTED THORACOSCOPIC SURGERY (VATS) Right 2024    Procedure: VATS, WITH PLEURODESIS;  Surgeon: Gerson Alvarado MD;  Location: [facility] OR [address] [place] [address];  Service: Cardiothoracic;  Laterality: Right;       Social History[3]      CBC: No results for input(s): "WBC", "RBC", "HGB", "HCT", "PLT", "MCV", "MCH", "MCHC" in the last 72 hours.    CMP: No results for input(s): "NA", "K", "CL", "CO2", "BUN", "CREATININE", "GLU", "MG", "PHOS", "CALCIUM", "ALBUMIN", "PROT", "ALKPHOS", "ALT", "AST", "BILITOT" in the last 72 hours.    INR  No results for input(s): "PT", "INR", "PROTIME", "APTT" in the last 72 hours.        Diagnostic Studies:      EKD Echo:  No results found for this or any previous visit.      Pre-op Assessment    I have reviewed the Patient Summary Reports.    I have reviewed the NPO " Status.   I have reviewed the Medications.     Review of Systems  Anesthesia Hx:  No problems with previous Anesthesia   History of prior surgery of interest to airway management or planning:            Denies Personal Hx of Anesthesia complications.                    Cardiovascular:  Cardiovascular Normal                                              Pulmonary:      Shortness of breath                  Hepatic/GI:  Bowel Prep.                   Neurological:  Neurology Normal                                      Endocrine:  Endocrine Normal                Physical Exam  General: Well nourished, Cooperative, Alert and Oriented    Airway:  Mallampati: / I  Mouth Opening: Normal  TM Distance: Normal  Neck ROM: Normal ROM    Dental:  Intact        Anesthesia Plan  Type of Anesthesia, risks & benefits discussed:    Anesthesia Type: Gen Natural Airway  Intra-op Monitoring Plan: Standard ASA Monitors  Post Op Pain Control Plan: multimodal analgesia  Induction:  IV  Airway Plan: Direct, Post-Induction  Informed Consent: Informed consent signed with the Patient and all parties understand the risks and agree with anesthesia plan.  All questions answered.   ASA Score: 3    Ready For Surgery From Anesthesia Perspective.     .           [1]   Patient Active Problem List  Diagnosis    Rosacea    Shortness of breath    Lymphangioleiomyomatosis    Secondary spontaneous pneumothorax    Hyperkalemia    Acute respiratory failure with hypoxia    Post-op pain    Opioid-induced constipation    Pulmonary lymphangioleiomyomatosis   [2]   No current facility-administered medications on file prior to encounter.     Current Outpatient Medications on File Prior to Encounter   Medication Sig Dispense Refill    doxycycline (ORACEA) 40 mg capsule Take 40 mg by mouth once daily.      sirolimus (RAPAMUNE) 1 MG Tab Take 2 tablets (2 mg total) by mouth once daily. 60 tablet 11    ivermectin (SOOLANTRA) 1 % Crea nightly.      multivitamin (THERAGRAN)  per tablet Take 1 tablet by mouth once daily. (Patient not taking: Reported on 3/13/2025)      RHOFADE 1 % Crea Apply topically once daily. (Patient not taking: Reported on 3/13/2025)     [3]   Social History  Socioeconomic History    Marital status:    Occupational History    Occupation:      Employer: OTHER   Tobacco Use    Smoking status: Never    Smokeless tobacco: Never   Substance and Sexual Activity    Alcohol use: Yes     Alcohol/week: 5.0 standard drinks of alcohol     Types: 5 Glasses of wine per week    Drug use: No    Sexual activity: Yes     Partners: Male     Birth control/protection: None     Social Drivers of Health     Financial Resource Strain: Low Risk  (12/10/2024)    Overall Financial Resource Strain (CARDIA)     Difficulty of Paying Living Expenses: Not hard at all   Food Insecurity: No Food Insecurity (12/10/2024)    Hunger Vital Sign     Worried About Running Out of Food in the Last Year: Never true     Ran Out of Food in the Last Year: Never true   Transportation Needs: No Transportation Needs (12/10/2024)    TRANSPORTATION NEEDS     Transportation : No   Physical Activity: Sufficiently Active (12/7/2024)    Exercise Vital Sign     Days of Exercise per Week: 4 days     Minutes of Exercise per Session: 40 min   Stress: Stress Concern Present (12/10/2024)    [employer] - Occupational Stress Questionnaire     Feeling of Stress : To some extent   Housing Stability: Unknown (12/10/2024)    Housing Stability Vital Sign     Unable to Pay for Housing in the Last Year: No     Homeless in the Last Year: No

## 2025-04-01 NOTE — PROVATION PATIENT INSTRUCTIONS
Discharge Summary/Instructions after an Endoscopic Procedure  Patient Name: Ann-Marie Saavedra  Patient MRN: [id number]  Patient YOB: 1980 Tuesday, April 1, 2025  Alexis Nur MD  Dear patient,  As a result of recent federal legislation (The Federal Cures Act), you may   receive lab or pathology results from your procedure in your [facility]   account before your physician is able to contact you. Your physician or   their representative will relay the results to you with their   recommendations at their soonest availability.  Thank you,  RESTRICTIONS:  During your procedure today, you received medications for sedation.  These   medications may affect your judgment, balance and coordination.  Therefore,   for 24 hours, you have the following restrictions:   - DO NOT drive a car, operate machinery, make legal/financial decisions,   sign important papers or drink alcohol.    ACTIVITY:  Today: no heavy lifting, straining or running due to procedural   sedation/anesthesia.  The following day: return to full activity including work.  DIET:  Eat and drink normally unless instructed otherwise.     TREATMENT FOR COMMON SIDE EFFECTS:  - Mild abdominal pain, nausea, belching, bloating or excessive gas:  rest,   eat lightly and use a heating pad.  - Sore Throat: treat with throat lozenges and/or gargle with warm salt   water.  - Because air was used during the procedure, expelling large amounts of air   from your rectum or belching is normal.  - If a bowel prep was taken, you may not have a bowel movement for 1-3 days.    This is normal.  SYMPTOMS TO WATCH FOR AND REPORT TO YOUR PHYSICIAN:  1. Abdominal pain or bloating, other than gas cramps.  2. Chest pain.  3. Back pain.  4. Signs of infection such as: chills or fever occurring within 24 hours   after the procedure.  5. Rectal bleeding, which would show as bright red, maroon, or black stools.   (A tablespoon of blood from the rectum is not serious, especially  if   hemorrhoids are present.)  6. Vomiting.  7. Weakness or dizziness.  GO DIRECTLY TO THE NEAREST EMERGENCY ROOM IF YOU HAVE ANY OF THE FOLLOWING:      Difficulty breathing              Chills and/or fever over 101 F   Persistent vomiting and/or vomiting blood   Severe abdominal pain   Severe chest pain   Black, tarry stools   Bleeding- more than one tablespoon   Any other symptom or condition that you feel may need urgent attention  Your doctor recommends these additional instructions:  If any biopsies were taken, your doctors clinic will contact you in 1 to 2   weeks with any results.  - Discharge patient to home.   - Await pathology results.   - Telephone endoscopist for pathology results in 3 weeks.   - Repeat colonoscopy in 3 years for surveillance.   - Return to referring physician.   - The findings and recommendations were discussed with the patient.   - The findings and recommendations were discussed with the patient's   family.  For questions, problems or results please call your physician - Alexis Nur MD at Work:  [phone].  [facility], EMERGENCY ROOM PHONE NUMBER: [phone]  IF A COMPLICATION OR EMERGENCY SITUATION ARISES AND YOU ARE UNABLE TO REACH   YOUR PHYSICIAN - GO DIRECTLY TO THE EMERGENCY ROOM.  Alexis Nur MD  4/1/2025 5:17:05 PM  This report has been verified and signed electronically.  Dear patient,  As a result of recent federal legislation (The Federal Cures Act), you may   receive lab or pathology results from your procedure in your [facility]   account before your physician is able to contact you. Your physician or   their representative will relay the results to you with their   recommendations at their soonest availability.  Thank you,  PROVATION

## 2025-04-01 NOTE — PLAN OF CARE
Discharge orders in place. Instructions reviewed with patient. PIV discontinued. Patient waiting on family to provide transport home via car.

## 2025-04-01 NOTE — H&P
[facility]  History & Physical    Subjective:      Chief Complaint/Reason for Admission:     Direct access Colonoscopy for pre lung tx work up patient with one SDR (MGF with CRC in his 60s) and mom with colon polyps in her 40's.  No known Hereditary CRC syndrome.    Ann-Marie Saavedra is a 44 y.o. female.    Past Medical History:   Diagnosis Date    Abnormal Pap smear 2013    no colpo    Keloid cicatrix     under right breast    Wound dehiscence in puerperium, perineal, delivered/postpartum 9/25/2015     Past Surgical History:   Procedure Laterality Date    CYST REMOVAL Right 2009    chest wall    FOOT TENDON SURGERY Bilateral 2008    bunion    INJECTION OF ANESTHETIC AGENT AROUND MULTIPLE INTERCOSTAL NERVES Right 12/11/2024    Procedure: BLOCK, NERVE, INTERCOSTAL, 2 OR MORE;  Surgeon: Gerson Alvarado MD;  Location: [facility] OR [address] [place] [address];  Service: Cardiothoracic;  Laterality: Right;    PLEURODESIS WITH VIDEO-ASSISTED THORACOSCOPIC SURGERY (VATS) Right 12/11/2024    Procedure: VATS, WITH PLEURODESIS;  Surgeon: Gerson Alvarado MD;  Location: [facility] OR [address] [place] [address];  Service: Cardiothoracic;  Laterality: Right;     Social History[1]    PTA Medications   Medication Sig    doxycycline (ORACEA) 40 mg capsule Take 40 mg by mouth once daily.    sirolimus (RAPAMUNE) 1 MG Tab Take 2 tablets (2 mg total) by mouth once daily.    ivermectin (SOOLANTRA) 1 % Crea nightly.    multivitamin (THERAGRAN) per tablet Take 1 tablet by mouth once daily. (Patient not taking: Reported on 3/13/2025)    RHOFADE 1 % Crea Apply topically once daily. (Patient not taking: Reported on 3/13/2025)     Review of patient's allergies indicates:  No Known Allergies     Review of Systems   Constitutional:  Positive for fever.       Objective:      Vital Signs (Most Recent)  Temp: 97.7 °F (36.5 °C) (04/01/25 1440)  Pulse: 60 (04/01/25 1440)  Resp: 16 (04/01/25 1440)  BP: (!) 115/59 (04/01/25 1440)  SpO2: 96 % (04/01/25 1440)    Vital Signs Range (Last  24H):  Temp:  [97.7 °F (36.5 °C)]   Pulse:  [60]   Resp:  [16]   BP: (115)/(59)   SpO2:  [96 %]     Physical Exam  Cardiovascular:      Rate and Rhythm: Normal rate.   Pulmonary:      Effort: Pulmonary effort is normal.   Neurological:      Mental Status: She is alert and oriented to person, place, and time.             Assessment:    Direct access Colonoscopy for pre lung tx work up patient with one SDR (MGF with CRC in his 60s) and mom with colon polyps in her 40's.  No known Hereditary CRC syndrome.      Plan:    Direct access Colonoscopy for pre lung tx work up patient with one SDR (MGF with CRC in his 60s) and mom with colon polyps in her 40's.  No known Hereditary CRC syndrome.           [1]   Social History  Tobacco Use    Smoking status: Never    Smokeless tobacco: Never   Substance Use Topics    Alcohol use: Yes     Alcohol/week: 5.0 standard drinks of alcohol     Types: 5 Glasses of wine per week    Drug use: No

## 2025-04-01 NOTE — TRANSFER OF CARE
"Anesthesia Transfer of Care Note    Patient: Ann-Marie Saavedra    Procedure(s) Performed: Procedure(s) (LRB):  COLONOSCOPY (N/A)    Patient location: [facility]    Anesthesia Type: general    Transport from OR: Transported from OR on 6-10 L/min O2 by face mask with adequate spontaneous ventilation    Post pain: adequate analgesia    Post assessment: no apparent anesthetic complications and tolerated procedure well    Post vital signs: stable    Level of consciousness: awake and alert    Nausea/Vomiting: no nausea/vomiting    Complications: none    Transfer of care protocol was followed      Last vitals: Visit Vitals  BP (!) 94/55 (BP Location: Right arm, Patient Position: Lying)   Pulse 60   Temp 36.7 °C (98 °F) (Temporal)   Resp 18   Ht 5' 6" (1.676 m)   Wt 53.1 kg (117 lb)   LMP 02/06/2025 (Approximate)   SpO2 100%   Breastfeeding No   BMI 18.88 kg/m²     "

## 2025-04-02 ENCOUNTER — RESULTS FOLLOW-UP (OUTPATIENT)
Dept: GASTROENTEROLOGY | Facility: CLINIC | Age: 45
End: 2025-04-02

## 2025-04-02 ENCOUNTER — PATIENT MESSAGE (OUTPATIENT)
Dept: GASTROENTEROLOGY | Facility: CLINIC | Age: 45
End: 2025-04-02
Payer: COMMERCIAL

## 2025-04-02 LAB
ESTROGEN SERPL-MCNC: NORMAL PG/ML
INSULIN SERPL-ACNC: NORMAL U[IU]/ML
LAB AP CLINICAL INFORMATION: NORMAL
LAB AP GROSS DESCRIPTION: NORMAL
LAB AP PERFORMING LOCATION(S): NORMAL
LAB AP REPORT FOOTNOTES: NORMAL
T3RU NFR SERPL: NORMAL %

## 2025-04-02 NOTE — ANESTHESIA POSTPROCEDURE EVALUATION
Anesthesia Post Evaluation    Patient: Ann-Marie Saavedra    Procedure(s) Performed: Procedure(s) (LRB):  COLONOSCOPY (N/A)    Final Anesthesia Type: general      Patient location during evaluation: PACU  Patient participation: Yes- Able to Participate  Level of consciousness: awake and alert  Post-procedure vital signs: reviewed and stable  Pain management: adequate  Airway patency: patent    PONV status at discharge: No PONV  Anesthetic complications: no      Cardiovascular status: stable  Respiratory status: unassisted and spontaneous ventilation  Hydration status: euvolemic  Follow-up not needed.              Vitals Value Taken Time   /68 04/01/25 17:32   Temp 36.7 °C (98 °F) 04/01/25 17:11   Pulse 53 04/01/25 17:40   Resp 19 04/01/25 17:40   SpO2 94 % 04/01/25 17:40   Vitals shown include unfiled device data.      No case tracking events are documented in the log.      Pain/Niki Score: Niki Score: 10 (4/1/2025  5:15 PM)

## 2025-04-04 ENCOUNTER — TELEPHONE (OUTPATIENT)
Dept: TRANSPLANT | Facility: CLINIC | Age: 45
End: 2025-04-04
Payer: COMMERCIAL

## 2025-04-07 ENCOUNTER — OFFICE VISIT (OUTPATIENT)
Dept: TRANSPLANT | Facility: CLINIC | Age: 45
End: 2025-04-07
Payer: COMMERCIAL

## 2025-04-07 VITALS
WEIGHT: 120 LBS | TEMPERATURE: 99 F | DIASTOLIC BLOOD PRESSURE: 71 MMHG | HEART RATE: 64 BPM | SYSTOLIC BLOOD PRESSURE: 110 MMHG | RESPIRATION RATE: 16 BRPM | BODY MASS INDEX: 19.29 KG/M2 | OXYGEN SATURATION: 99 % | HEIGHT: 66 IN

## 2025-04-07 DIAGNOSIS — R42 DIZZINESS AND GIDDINESS: ICD-10-CM

## 2025-04-07 DIAGNOSIS — Z79.899 HIGH RISK MEDICATION USE: ICD-10-CM

## 2025-04-07 DIAGNOSIS — J84.81 LYMPHANGIOLEIOMYOMATOSIS: Primary | ICD-10-CM

## 2025-04-07 DIAGNOSIS — J96.11 CHRONIC RESPIRATORY FAILURE WITH HYPOXIA: ICD-10-CM

## 2025-04-07 PROCEDURE — 1160F RVW MEDS BY RX/DR IN RCRD: CPT | Mod: CPTII,NTX,S$GLB, | Performed by: INTERNAL MEDICINE

## 2025-04-07 PROCEDURE — 1159F MED LIST DOCD IN RCRD: CPT | Mod: CPTII,NTX,S$GLB, | Performed by: INTERNAL MEDICINE

## 2025-04-07 PROCEDURE — 99999 PR PBB SHADOW E&M-EST. PATIENT-LVL IV: CPT | Mod: PBBFAC,TXP,, | Performed by: INTERNAL MEDICINE

## 2025-04-07 PROCEDURE — 3008F BODY MASS INDEX DOCD: CPT | Mod: CPTII,NTX,S$GLB, | Performed by: INTERNAL MEDICINE

## 2025-04-07 PROCEDURE — 99214 OFFICE O/P EST MOD 30 MIN: CPT | Mod: NTX,S$GLB,, | Performed by: INTERNAL MEDICINE

## 2025-04-07 PROCEDURE — 3044F HG A1C LEVEL LT 7.0%: CPT | Mod: CPTII,NTX,S$GLB, | Performed by: INTERNAL MEDICINE

## 2025-04-07 PROCEDURE — 3078F DIAST BP <80 MM HG: CPT | Mod: CPTII,NTX,S$GLB, | Performed by: INTERNAL MEDICINE

## 2025-04-07 PROCEDURE — 3074F SYST BP LT 130 MM HG: CPT | Mod: CPTII,NTX,S$GLB, | Performed by: INTERNAL MEDICINE

## 2025-04-07 RX ORDER — EVEROLIMUS 5 MG/1
5 TABLET ORAL DAILY
Qty: 30 TABLET | Refills: 3 | Status: ACTIVE | OUTPATIENT
Start: 2025-04-07

## 2025-04-07 NOTE — LETTER
April 8, 2025                      [facility] - Trans[facility] 1st Fl  [address]  [facility] [address]  Phone: [phone]   Patient: Ann-Marie Saavedra   MR Number: [id number]   YOB: 1980   Date of Visit: 4/7/2025       Dear       Thank you for referring Ann-Marie Saavedra to me for evaluation. Attached you will find relevant portions of my assessment and plan of care.    If you have questions, please do not hesitate to call me. I look forward to following Ann-Marie Saavedra along with you.    Sincerely,    Kesha Brandon, DO    Enclosure    If you would like to receive this communication electronically, please contact [email or web address] or [phone] to request [employer] access.    [employer] is a tool which provides read-only access to select patient information with whom you have a relationship. Its easy to use and provides real time access to review your patients record including encounter summaries, notes, results, and demographic information.    If you feel you have received this communication in error or would no longer like to receive these types of communications, please e-mail [email or web address]

## 2025-04-07 NOTE — PROGRESS NOTES
ADVANCED LUNG DISEASE CLINIC FOLLOW UP                                                                                                                                           Reason for Visit:  HEBERT    Referring Physician: Kesha Brandon, *    History of Present Illness: Ann-Marie Saavedra is a 44 y.o. female who is on 0L of oxygen.  She is on no assisted ventilation.  Her New York Heart Association Class is III and a Karnofsky score of 70% - Cares for self: Unable to carry on normal activity or active work. She is not diabetic.    Requires Supplemental O2: No    Massive Hemoptysis: 0 occurrences  (Enter the number of times in the last year)    Exacerbations: 1 occurrences (1 spontaneous PTX)  (Enter the number of times in the last year)    Microbiology Infections: No    Thoracic Surgery: Spontaneous RIGHT sided PTX with VATs and doxy pleurodesis     Presents today for routine follow up.  Since her last visit, she has been feeling much improved.  Her cough and URTI symptoms have resolved.  She has been increasing her activity and is walking more.  She has oxygen and occasionally uses 2L with exertion.  She no longer has chest pain at the incision site.  Wants to do more activity.  Overall, doing much better than previous.  Follows with [facility] and has been approved for LUT which she is planning to defer till the end of April.  Tolerating sirolimus.       INITIAL HPI:  Pt here today for initial lung transplant evaluation for a diagnosis of HEBERT.  She was recently diagnosed with HEBERT the end of 2024.  She presented to Dr. Herzog with complaints of dyspnea worsening over the last few years.  She was previously very healthy and very active.  Ran marathons and played tennis regularly.  She has young children and has noticed difficulty ambulating up stairs and having more dyspnea with daily activities.  She was found to have severe obstruction on PFTs during her visit with Dr. Herzog.  CT chest showed a  "moderate right sided pneumothorax and innumerable pulmonary cysts consistent with HEBERT.  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  She then continued to have dyspnea and presented to [facility] ED where she had a right sided chest tube placed for the pneumothorax.  She continued to have an airleak so thoracic surgery was consulted and after discussions with the LUT team, she proceeded with right sided VATs/pleurodesis.      Since then, she feels slightly improved.  Still has significant dyspnea with climbing stairs and daily activities.  Does not wear supplemental oxygen.  Is taking sirolimus.  Has noted sinus congestion and a cough since discharge.  She denies any productive sputum but states that her cough feels very congested and "wet".  She has been taking OTC meds    She is  with children.  No smoking history.  No history of blood transfusions.  No illicit drugs or heavy alcohol use.  No family hx of HEBERT or tuberous sclerosis.  No skin lesions.  No neuro history or seizures.  No renal dysfunction or GYN issues.  She was found to have a renal mass possibly related to HEBERT.        Past Medical History:   Diagnosis Date    Abnormal Pap smear 2013    no colpo    Keloid cicatrix     under right breast    Wound dehiscence in puerperium, perineal, delivered/postpartum 9/25/2015       Past Surgical History:   Procedure Laterality Date    COLONOSCOPY N/A 4/1/2025    Procedure: COLONOSCOPY;  Surgeon: Alexis Nur MD;  Location: [facility] ([address] [place] [address]);  Service: Endoscopy;  Laterality: N/A;  2nd floor lung disease, lung transplant work-up, O2 as needed  3/26 ref by Elsi Klein MD, peg,portal-st  3/28-precall-complete-mkp    CYST REMOVAL Right 2009    chest wall    FOOT TENDON SURGERY Bilateral 2008    bunion    INJECTION OF ANESTHETIC AGENT AROUND MULTIPLE INTERCOSTAL NERVES Right 12/11/2024    Procedure: BLOCK, NERVE, INTERCOSTAL, 2 OR MORE;  " Surgeon: Gerson Alvarado MD;  Location: [facility] OR [address] [place] [address];  Service: Cardiothoracic;  Laterality: Right;    PLEURODESIS WITH VIDEO-ASSISTED THORACOSCOPIC SURGERY (VATS) Right 12/11/2024    Procedure: VATS, WITH PLEURODESIS;  Surgeon: Gerson Alvarado MD;  Location: [facility] OR [address] [place] [address];  Service: Cardiothoracic;  Laterality: Right;       Allergies: Patient has no known allergies.    Current Outpatient Medications   Medication Sig    doxycycline (ORACEA) 40 mg capsule Take 40 mg by mouth once daily.    ivermectin (SOOLANTRA) 1 % Crea nightly.    everolimus, antineoplastic, (AFINITOR) 5 mg Tab Take 1 tablet (5 mg total) by mouth once daily.    multivitamin (THERAGRAN) per tablet Take 1 tablet by mouth once daily. (Patient not taking: Reported on 4/7/2025)    RHOFADE 1 % Crea Apply topically once daily. (Patient not taking: Reported on 4/7/2025)     No current facility-administered medications for this visit.       Immunization History   Administered Date(s) Administered    COVID-19, MRNA, LN-S, PF (Pfizer) (Purple Cap) 03/09/2021, 03/30/2021, 11/20/2021    Influenza - Quadrivalent - MDCK - PF 10/06/2020    Influenza - Quadrivalent - PF *Preferred* (6 months and older) 09/17/2015, 10/26/2017, 10/29/2022    Influenza - Trivalent - Fluarix, Flulaval, Fluzone, Afluria - PF 11/27/2024    Tdap 06/22/2015, 09/07/2017     Family History:    Family History   Problem Relation Name Age of Onset    Hyperlipidemia Mother      No Known Problems Father      Eczema Sister      No Known Problems Sister      No Known Problems Brother      Lung cancer Maternal Grandmother Polly Leandro     Cancer Maternal Grandmother Polly Leandro     Colon cancer Maternal Grandfather A.C. Leandro     Cancer Maternal Grandfather A.C. Leandro     Colon cancer Paternal Grandfather Hugo Newton 67    Cancer Paternal Grandfather Hugo Glez     Prostate cancer Paternal Uncle Shady Glez     Cancer Paternal Uncle Shady Glez     Melanoma Neg Hx       Acne Neg Hx      Lupus Neg Hx      Psoriasis Neg Hx      Breast cancer Neg Hx      Ovarian cancer Neg Hx      Heart attack Neg Hx      Stroke Neg Hx      Diabetes Neg Hx      Osteoporosis Neg Hx       Social History     Substance and Sexual Activity   Alcohol Use Yes    Alcohol/week: 5.0 standard drinks of alcohol    Types: 5 Glasses of wine per week      Social History     Substance and Sexual Activity   Drug Use No      Social History     Socioeconomic History    Marital status:    Occupational History    Occupation:      Employer: OTHER   Tobacco Use    Smoking status: Never     Passive exposure: Current    Smokeless tobacco: Never   Substance and Sexual Activity    Alcohol use: Yes     Alcohol/week: 5.0 standard drinks of alcohol     Types: 5 Glasses of wine per week    Drug use: No    Sexual activity: Yes     Partners: Male     Birth control/protection: None     Social Drivers of Health     Financial Resource Strain: Low Risk  (12/10/2024)    Overall Financial Resource Strain (CARDIA)     Difficulty of Paying Living Expenses: Not hard at all   Food Insecurity: No Food Insecurity (12/10/2024)    Hunger Vital Sign     Worried About Running Out of Food in the Last Year: Never true     Ran Out of Food in the Last Year: Never true   Transportation Needs: No Transportation Needs (12/10/2024)    TRANSPORTATION NEEDS     Transportation : No   Physical Activity: Sufficiently Active (12/7/2024)    Exercise Vital Sign     Days of Exercise per Week: 4 days     Minutes of Exercise per Session: 40 min   Stress: Stress Concern Present (12/10/2024)    [place] [employer] - Occupational Stress Questionnaire     Feeling of Stress : To some extent   Housing Stability: Unknown (12/10/2024)    Housing Stability Vital Sign     Unable to Pay for Housing in the Last Year: No     Homeless in the Last Year: No     Review of Systems   Constitutional:  Negative for chills, diaphoresis, fever,  "malaise/fatigue and weight loss.   HENT:  Negative for congestion, ear discharge, ear pain, hearing loss, nosebleeds, sinus pain, sore throat and tinnitus.    Eyes:  Negative for blurred vision, double vision, photophobia, pain, discharge and redness.   Respiratory:  Positive for shortness of breath. Negative for cough, hemoptysis, sputum production, wheezing and stridor.    Cardiovascular:  Negative for chest pain, palpitations, orthopnea, claudication, leg swelling and PND.   Gastrointestinal:  Negative for abdominal pain, blood in stool, constipation, diarrhea, heartburn, melena, nausea and vomiting.   Genitourinary:  Negative for dysuria, flank pain, frequency, hematuria and urgency.   Musculoskeletal:  Negative for back pain, falls, joint pain, myalgias and neck pain.   Skin:  Negative for itching and rash.   Neurological:  Negative for dizziness, tingling, tremors, sensory change, speech change, focal weakness, seizures, loss of consciousness, weakness and headaches.   Endo/Heme/Allergies:  Negative for environmental allergies and polydipsia. Does not bruise/bleed easily.   Psychiatric/Behavioral:  Negative for depression, hallucinations, memory loss, substance abuse and suicidal ideas. The patient is not nervous/anxious and does not have insomnia.      Vitals  /71 (BP Location: Right arm, Patient Position: Sitting)   Pulse 64   Temp 98.6 °F (37 °C) (Oral)   Resp 16   Ht 5' 6" (1.676 m)   Wt 54.4 kg (120 lb)   LMP 02/06/2025 (Approximate)   SpO2 99%   BMI 19.37 kg/m²   Physical Exam  Vitals and nursing note reviewed.   Constitutional:       General: She is not in acute distress.     Appearance: She is well-developed. She is not diaphoretic.   HENT:      Head: Normocephalic and atraumatic.      Nose: Nose normal.      Mouth/Throat:      Pharynx: No oropharyngeal exudate.   Eyes:      General: No scleral icterus.     Conjunctiva/sclera: Conjunctivae normal.      Pupils: Pupils are equal, round, and " reactive to light.   Neck:      Thyroid: No thyromegaly.      Vascular: No JVD.      Trachea: No tracheal deviation.   Cardiovascular:      Rate and Rhythm: Normal rate and regular rhythm.      Heart sounds: Normal heart sounds. No murmur heard.     No friction rub. No gallop.   Pulmonary:      Effort: Pulmonary effort is normal. No respiratory distress.      Breath sounds: Decreased breath sounds present. No wheezing or rales.   Abdominal:      General: Bowel sounds are normal. There is no distension.      Palpations: Abdomen is soft.      Tenderness: There is no abdominal tenderness.   Musculoskeletal:         General: No deformity. Normal range of motion.      Cervical back: Normal range of motion and neck supple.   Skin:     General: Skin is warm and dry.      Capillary Refill: Capillary refill takes less than 2 seconds.      Coloration: Skin is not pale.      Findings: No erythema or rash.   Neurological:      Mental Status: She is alert and oriented to person, place, and time.      Cranial Nerves: No cranial nerve deficit.   Psychiatric:         Judgment: Judgment normal.         Labs:  Admission on 04/01/2025, Discharged on 04/01/2025   Component Date Value    POC Preg Test, Ur 04/01/2025 Negative      Acceptab* 04/01/2025 Yes     Case Report 04/01/2025                      Value:[facility]                           Case: [id number]                                Authorizing Provider:  Alexis Nur MD      Collected:           04/01/2025 04:54 PM          Ordering Location:     [facility] - End[facility]       Received:            04/01/2025 10:20 PM          Pathologist:           Juan Carlos Ozuna MD                                                         Specimen:    Large intestine, Descending Colon, Polyp, 2mm, suspect early adenoma                       Clinical Information 04/01/2025                      Value:Procedure:  COLONOSCOPY  Pre-op Diagnosis:  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          Gross Description 04/01/2025                      Value:1. Large intestine, Descending Colon: Polyp, 2mm, suspect early adenoma  MRN # on Epic Label:  [id number]  MRN # on Pathology Label:  [id number]    The specimen is received in formalin labeled "descending colon polyp, 2mm, suspect early adenoma".  The specimen consists of 1 tan fragment of soft tissue measuring 0.8 x 0.5 x 0.3 cm.  The specimen is submitted entirely in cassette 1A.    Grossing was completed by Cleveland Rabago on 4/2/2025.        Report Footnotes 04/01/2025                      Value:Unless the case is a "gross only" or additional testing only, the final diagnosis for each specimen is based on a microscopic examination of appropriate tissue sections.      Performing Location 04/01/2025                      Value:Grossing performed at [address], [address]    Sign out performed at [address], [address]              No data to display                  1/6/2025    12:49 PM   6MW   6MWT Status completed without stopping   Patient Reported Dyspnea   Was O2 used? Yes   Delivery Method Cannula;Pull Tank;Continuous Flow   6MW Distance walked (feet) 800 feet   Distance walked (meters) 243.84 meters   Did patient stop? No   Oxygen Saturation 93 %   Supplemental Oxygen Room Air   Heart Rate " 98 bpm   Blood Pressure 105/51   Vitaliy Dyspnea Rating  light   Oxygen Saturation 90 %   Supplemental Oxygen 2 L/M   Heart Rate 110 bpm   Blood Pressure 118/77   Vitaliy Dyspnea Rating  moderate   Recovery Time (seconds) 104 seconds   Oxygen Saturation 93 %   Supplemental Oxygen 2 L/M   Heart Rate 102 bpm       Imaging:  Results for orders placed during the hospital encounter of 12/30/24    X-Ray Chest PA And Lateral    Narrative  EXAMINATION:  XR CHEST PA AND LATERAL    CLINICAL HISTORY:  Secondary spontaneous pneumothorax    FINDINGS:  Heart size is small which can be seen with anorexia or vitamin deficiency.  There is a right costophrenic angle pneumothorax status post chest tube removal.  There is partial right lung base subsegmental atelectasis.  There are changes of lymph angio leiomyomatosis.    Impression  Small right pneumothorax and chronic heart and lung changes with right lung base subsegmental atelectasis.      Electronically signed by: Julius Bautista MD  Date:    12/31/2024  Time:    10:50    Results for orders placed during the hospital encounter of 12/03/24    CT Chest Without Contrast    Narrative  EXAMINATION:  CT CHEST WITHOUT CONTRAST    CLINICAL HISTORY:  Pneumothorax; Pneumothorax, unspecified    TECHNIQUE:  Low dose axial images, sagittal and coronal reformations were obtained from the thoracic inlet to the lung bases. Contrast was not administered.    COMPARISON:  12/02/2024    FINDINGS:  Support tubes and lines: None.    Aorta: Normal caliber.    Heart: Normal size.    Coronary arteries: No calcifications.    Pericardium: Normal. No effusion, thickening, or calcification.    Central pulmonary arteries: Normal caliber.    Base of neck/thyroid: Unremarkable.    Lymph nodes: No supraclavicular, axillary, internal mammary, mediastinal, or hilar adenopathy.    Esophagus: Unremarkable.    Pleura: No effusion, thickening, or calcification.    Upper abdomen: There is a questionable fluid attenuation  lesion in the left upper quadrant, incompletely evaluated, but measuring up to 5.3 x 4.3 cm on this exam (series 2, image 149).    Body wall: Unremarkable.    Airways: Unremarkable.    Lungs: A small right pneumothorax is present, stable to slightly improved when compared to 12/02/2024.  Innumerable thin walled cysts are seen throughout both lungs.    Bones: Unremarkable.    Impression  1. Small right pneumothorax, not substantially changed when compared to 12/02/2023.  2. Innumerable thin walled cysts seen throughout both lungs.  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  3. Fluid attenuation lesion in the left upper quadrant, incompletely evaluated on this exam, but measuring up to 5.3 cm.  In light of the lung findings, this may represent a lymphangioleiomyoma.  A CT of the abdomen and pelvis contrast is recommended for further evaluation.      Electronically signed by: Vanessa Gray  Date:    12/03/2024  Time:    11:41        Cardiodiagnostics:  Results for orders placed during the hospital encounter of 12/07/24    Echo    Interpretation Summary    Left Ventricle: The left ventricle is normal in size. Normal wall thickness. Septal motion is abnormal. There is normal systolic function with a visually estimated ejection fraction of 55 - 60%. There is normal diastolic function. Normal left ventricular filling pressure.    Right Ventricle: Normal right ventricular cavity size. Right ventricle wall motion  is normal. Systolic function is normal.    Left Atrium: Left atrium is mildly dilated.    Mitral Valve: There is trivial-mild regurgitation.    Tricuspid Valve: There is trace regurgitation.    IVC/SVC: Intermediate venous pressure at 8 mmHg.        Assessment:  1. Lymphangioleiomyomatosis    2. Chronic respiratory failure with hypoxia    3. High risk medication use        Plan:   Followed for HEBERT with spontaneous RIGHT sided pneumothorax.  S/p VATs/pleurodesis.  CT imaging consistent  with HEBERT with innumerable thin walled cysts throughout all lung fields.  Currently on sirolimus 1mg.  Diagnosis made by imaging and history.  Would not pursue lung biopsy given her hx of PTX  CBC, CMP, UA, lipid panel, and urine pregnancy test reviewed.  Given recent approval for LUT at [facility], will change sirolimus to Affinitor (5mg daily with goal trough 5-15).  Pending a MRI brain per [facility] to r/o tuberous sclerosis.  VEGF-D could not be obtained due to reagent not being available currently and lung bx not recommended.     Prescribed oxygen at 2L with exertion.      Continue to monitor sirolimus levels with routine labs, lipids, UA, and pregnancy tests per protocols.  Has IUD in place.    Follow up in 3 months.  No testing.  Labs per protocol for anh Brandon D.O.  Pulmonary/Critical Care and Lung Transplantation  [facility] [place]

## 2025-04-11 ENCOUNTER — HOSPITAL ENCOUNTER (OUTPATIENT)
Dept: RADIOLOGY | Facility: HOSPITAL | Age: 45
Discharge: HOME | End: 2025-04-11
Attending: INTERNAL MEDICINE
Payer: COMMERCIAL

## 2025-04-11 ENCOUNTER — TELEPHONE (OUTPATIENT)
Dept: TRANSPLANT | Facility: CLINIC | Age: 45
End: 2025-04-11
Payer: COMMERCIAL

## 2025-04-11 ENCOUNTER — RESULTS FOLLOW-UP (OUTPATIENT)
Dept: TRANSPLANT | Facility: CLINIC | Age: 45
End: 2025-04-11

## 2025-04-11 DIAGNOSIS — R42 DIZZINESS AND GIDDINESS: ICD-10-CM

## 2025-04-11 PROCEDURE — 70551 MRI BRAIN STEM W/O DYE: CPT | Mod: TC,TXP

## 2025-04-11 PROCEDURE — 70551 MRI BRAIN STEM W/O DYE: CPT | Mod: 26,NTX,, | Performed by: RADIOLOGY

## 2025-04-11 NOTE — TELEPHONE ENCOUNTER
Attempted to contact Tiffanie Rollins regarding the MRI of the brain results. No answer. Left a message informing her that patient had the MRI of the brain today. Instructed her to contact me if she is unable to access the records via Epic.    Received the below message from Dr. Brandon regarding the MRI results:      4/11/25 11:18 AM  Result Note  Please let [facility] know that the MRI is done and results are available.      Informed Dr. Brandon that a message was left for Tiffanie, the lung transplant coordinator at [facility], informing her of the completion of the MRI of the brain.    13:56 - Received a return call from Tiffanie. Notified her of the results of the MRI. She requested the results because she is unable to access in Epic. MRI results sent to Tiffanie.

## 2025-04-15 ENCOUNTER — TELEPHONE (OUTPATIENT)
Dept: TRANSPLANT | Facility: CLINIC | Age: 45
End: 2025-04-15
Payer: COMMERCIAL

## 2025-04-15 NOTE — TELEPHONE ENCOUNTER
Received outside orders from Dr. Roldan with [facility] for a MRI of brain with contrast.     [facility]. Spoke with Roxy. Notified her of the orders received. Inquired where to fax the orders to schedule the test. Fax number obtained. Orders faxed.

## 2025-04-22 ENCOUNTER — TELEPHONE (OUTPATIENT)
Dept: TRANSPLANT | Facility: CLINIC | Age: 45
End: 2025-04-22
Payer: COMMERCIAL

## 2025-04-22 ENCOUNTER — PATIENT MESSAGE (OUTPATIENT)
Dept: TRANSPLANT | Facility: CLINIC | Age: 45
End: 2025-04-22
Payer: COMMERCIAL

## 2025-04-22 NOTE — TELEPHONE ENCOUNTER
Received a message from patient inquiring if we received the orders for the MRI of the brain with contrast. Informed patient that the orders were received and faxed to the [facility]. Informed her that I would contact the [facility] to determine if there is an issues with the orders.    Contacted the [facility]. Spoke with Rajni. Informed her that stat orders for a MRI of the brain with contrast was sent on 4/15/25, but the test has not been scheduled. Inquired if the orders were received or if there is an issue with the orders. Rajni asked that I re-fax the orders to a different fax. Fax number obtained. Orders faxed to [phone].    Message sent to patient notifying her of above.

## 2025-04-24 ENCOUNTER — TELEPHONE (OUTPATIENT)
Dept: TRANSPLANT | Facility: CLINIC | Age: 45
End: 2025-04-24
Payer: COMMERCIAL

## 2025-04-24 DIAGNOSIS — J84.81 LYMPHANGIOLEIOMYOMATOSIS: Primary | ICD-10-CM

## 2025-04-24 DIAGNOSIS — Z79.899 HIGH RISK MEDICATION USE: ICD-10-CM

## 2025-04-24 NOTE — TELEPHONE ENCOUNTER
Discussed patient with Dr. Brandon during the ALD / Pre-Lung Transplant Patient Review Meeting yesterday. Discussed lab monitoring since sirolimus was changed to Afinitor during her last office visit on 4/7/25. Instructions received to check labs 2 weeks after initiation.     Attempted to contact patient to determine if she discontinued the sirolimus and started Afinitor. No answer. Left a message inquiring if she started the Afinitor. Informed her that she will need labs 2 weeks after initiation per Dr. Brandon's instructions. Requested a return call or a message via [facility] regarding if / when she started the Afinitor and date, time and location for labs.

## 2025-04-29 ENCOUNTER — PATIENT MESSAGE (OUTPATIENT)
Dept: INTERNAL MEDICINE | Facility: CLINIC | Age: 45
End: 2025-04-29
Payer: COMMERCIAL

## 2025-04-29 ENCOUNTER — LAB VISIT (OUTPATIENT)
Dept: LAB | Facility: HOSPITAL | Age: 45
End: 2025-04-29
Attending: INTERNAL MEDICINE
Payer: COMMERCIAL

## 2025-04-29 DIAGNOSIS — Z79.899 HIGH RISK MEDICATION USE: ICD-10-CM

## 2025-04-29 DIAGNOSIS — Z23 NEED FOR VACCINATION: ICD-10-CM

## 2025-04-29 DIAGNOSIS — J84.81 LYMPHANGIOLEIOMYOMATOSIS: ICD-10-CM

## 2025-04-29 DIAGNOSIS — J84.81 PULMONARY LYMPHANGIOLEIOMYOMATOSIS: Primary | ICD-10-CM

## 2025-04-29 PROCEDURE — 80169 DRUG ASSAY EVEROLIMUS: CPT | Mod: TXP

## 2025-04-29 PROCEDURE — 36415 COLL VENOUS BLD VENIPUNCTURE: CPT | Mod: PO,TXP

## 2025-04-30 ENCOUNTER — HOSPITAL ENCOUNTER (OUTPATIENT)
Dept: RADIOLOGY | Facility: HOSPITAL | Age: 45
Discharge: HOME | End: 2025-04-30
Attending: STUDENT IN AN ORGANIZED HEALTH CARE EDUCATION/TRAINING PROGRAM
Payer: COMMERCIAL

## 2025-04-30 DIAGNOSIS — Z12.31 ENCOUNTER FOR SCREENING MAMMOGRAM FOR BREAST CANCER: ICD-10-CM

## 2025-04-30 PROCEDURE — 77067 SCR MAMMO BI INCL CAD: CPT | Mod: 26,NTX,, | Performed by: RADIOLOGY

## 2025-04-30 PROCEDURE — 77063 BREAST TOMOSYNTHESIS BI: CPT | Mod: 26,NTX,, | Performed by: RADIOLOGY

## 2025-04-30 PROCEDURE — 77067 SCR MAMMO BI INCL CAD: CPT | Mod: TC,TXP

## 2025-05-01 ENCOUNTER — RESULTS FOLLOW-UP (OUTPATIENT)
Dept: TRANSPLANT | Facility: HOSPITAL | Age: 45
End: 2025-05-01

## 2025-05-01 ENCOUNTER — TELEPHONE (OUTPATIENT)
Dept: TRANSPLANT | Facility: CLINIC | Age: 45
End: 2025-05-01
Payer: COMMERCIAL

## 2025-05-01 LAB — EVEROLIMUS BLD-MCNC: 4 NG/ML

## 2025-05-01 NOTE — TELEPHONE ENCOUNTER
Received the below result note from Dr. Brandon:        5/1/25 11:32 AM Result Note    No changes  Everolimus    Message sent to patient regarding Everolimus results and Dr. Brandon's instructions.

## 2025-05-16 ENCOUNTER — TELEPHONE (OUTPATIENT)
Dept: TRANSPLANT | Facility: CLINIC | Age: 45
End: 2025-05-16
Payer: COMMERCIAL

## 2025-08-18 ENCOUNTER — LAB VISIT (OUTPATIENT)
Dept: LAB | Facility: HOSPITAL | Age: 45
End: 2025-08-18
Payer: COMMERCIAL

## 2025-08-18 DIAGNOSIS — Z79.899 HIGH RISK MEDICATION USE: ICD-10-CM

## 2025-08-18 DIAGNOSIS — J84.81 LYMPHANGIOLEIOMYOMATOSIS: ICD-10-CM

## 2025-08-18 LAB
ABSOLUTE NEUTROPHIL MANUAL (OHS): 1.8 K/UL (ref 1.8–7.7)
ALBUMIN SERPL BCP-MCNC: 3.9 G/DL (ref 3.5–5.2)
ALP SERPL-CCNC: 79 UNIT/L (ref 40–150)
ALT SERPL W/O P-5'-P-CCNC: 40 UNIT/L (ref 0–55)
ANION GAP (OHS): 8 MMOL/L (ref 8–16)
AST SERPL-CCNC: 31 UNIT/L (ref 0–50)
BILIRUB SERPL-MCNC: 0.3 MG/DL (ref 0.1–1)
BUN SERPL-MCNC: 16 MG/DL (ref 6–20)
CALCIUM SERPL-MCNC: 8.9 MG/DL (ref 8.7–10.5)
CHLORIDE SERPL-SCNC: 109 MMOL/L (ref 95–110)
CHOLEST SERPL-MCNC: 162 MG/DL (ref 120–199)
CHOLEST/HDLC SERPL: 3 {RATIO} (ref 2–5)
CO2 SERPL-SCNC: 25 MMOL/L (ref 23–29)
CREAT SERPL-MCNC: 0.8 MG/DL (ref 0.5–1.4)
EOSINOPHIL NFR BLD MANUAL: 1 % (ref 0–8)
ERYTHROCYTE [DISTWIDTH] IN BLOOD BY AUTOMATED COUNT: 13.4 % (ref 11.5–14.5)
GFR SERPLBLD CREATININE-BSD FMLA CKD-EPI: >60 ML/MIN/1.73/M2
GLUCOSE SERPL-MCNC: 91 MG/DL (ref 70–110)
HCT VFR BLD AUTO: 32.5 % (ref 37–48.5)
HDLC SERPL-MCNC: 54 MG/DL (ref 40–75)
HDLC SERPL: 33.3 % (ref 20–50)
HGB BLD-MCNC: 10.5 GM/DL (ref 12–16)
LDLC SERPL CALC-MCNC: 85.2 MG/DL (ref 63–159)
LYMPHOCYTES NFR BLD MANUAL: 42 % (ref 18–48)
MCH RBC QN AUTO: 29.4 PG (ref 27–31)
MCHC RBC AUTO-ENTMCNC: 32.3 G/DL (ref 32–36)
MCV RBC AUTO: 91 FL (ref 82–98)
MONOCYTES NFR BLD MANUAL: 1 % (ref 4–15)
NEUTROPHILS NFR BLD MANUAL: 56 % (ref 38–73)
NONHDLC SERPL-MCNC: 108 MG/DL
NUCLEATED RBC (/100WBC) (OHS): 0 /100 WBC
PLATELET # BLD AUTO: 234 K/UL (ref 150–450)
PLATELET BLD QL SMEAR: ABNORMAL
PMV BLD AUTO: 9.7 FL (ref 9.2–12.9)
POTASSIUM SERPL-SCNC: 3.4 MMOL/L (ref 3.5–5.1)
PROT SERPL-MCNC: 5.9 GM/DL (ref 6–8.4)
RBC # BLD AUTO: 3.57 M/UL (ref 4–5.4)
SODIUM SERPL-SCNC: 142 MMOL/L (ref 136–145)
TRIGL SERPL-MCNC: 114 MG/DL (ref 30–150)
WBC # BLD AUTO: 3.25 K/UL (ref 3.9–12.7)

## 2025-08-18 PROCEDURE — 85007 BL SMEAR W/DIFF WBC COUNT: CPT

## 2025-08-18 PROCEDURE — 80195 ASSAY OF SIROLIMUS: CPT

## 2025-08-18 PROCEDURE — 82310 ASSAY OF CALCIUM: CPT

## 2025-08-18 PROCEDURE — 80061 LIPID PANEL: CPT

## 2025-08-18 PROCEDURE — 36415 COLL VENOUS BLD VENIPUNCTURE: CPT | Mod: PN

## 2025-08-19 ENCOUNTER — RESULTS FOLLOW-UP (OUTPATIENT)
Dept: TRANSPLANT | Facility: HOSPITAL | Age: 45
End: 2025-08-19
Payer: COMMERCIAL

## 2025-08-19 LAB — SIROLIMUS BLD-MCNC: <2 NG/ML (ref 4–20)

## 2025-08-25 ENCOUNTER — LAB VISIT (OUTPATIENT)
Dept: LAB | Facility: HOSPITAL | Age: 45
End: 2025-08-25
Attending: STUDENT IN AN ORGANIZED HEALTH CARE EDUCATION/TRAINING PROGRAM
Payer: COMMERCIAL

## 2025-08-25 DIAGNOSIS — Z51.81 ENCOUNTER FOR THERAPEUTIC DRUG MONITORING: ICD-10-CM

## 2025-08-25 DIAGNOSIS — Z94.2 LUNG REPLACED BY TRANSPLANT: Primary | ICD-10-CM

## 2025-08-25 DIAGNOSIS — Z79.899 POLYPHARMACY: ICD-10-CM

## 2025-08-25 DIAGNOSIS — Z48.298 AFTERCARE FOLLOWING ORGAN TRANSPLANT: ICD-10-CM

## 2025-08-25 LAB
ABSOLUTE NEUTROPHIL MANUAL (OHS): 3.2 K/UL (ref 1.8–7.7)
ALBUMIN SERPL BCP-MCNC: 4.3 G/DL (ref 3.5–5.2)
ALP SERPL-CCNC: 81 UNIT/L (ref 40–150)
ALT SERPL W/O P-5'-P-CCNC: 39 UNIT/L (ref 0–55)
ANION GAP (OHS): 12 MMOL/L (ref 8–16)
AST SERPL-CCNC: 33 UNIT/L (ref 0–50)
BILIRUB SERPL-MCNC: 0.3 MG/DL (ref 0.1–1)
BUN SERPL-MCNC: 18 MG/DL (ref 6–20)
CALCIUM SERPL-MCNC: 9.5 MG/DL (ref 8.7–10.5)
CHLORIDE SERPL-SCNC: 108 MMOL/L (ref 95–110)
CO2 SERPL-SCNC: 23 MMOL/L (ref 23–29)
CREAT SERPL-MCNC: 1.2 MG/DL (ref 0.5–1.4)
EOSINOPHIL NFR BLD MANUAL: 2 % (ref 0–8)
ERYTHROCYTE [DISTWIDTH] IN BLOOD BY AUTOMATED COUNT: 13.8 % (ref 11.5–14.5)
GFR SERPLBLD CREATININE-BSD FMLA CKD-EPI: 58 ML/MIN/1.73/M2
GLUCOSE SERPL-MCNC: 113 MG/DL (ref 70–110)
HCT VFR BLD AUTO: 35.2 % (ref 37–48.5)
HGB BLD-MCNC: 11.5 GM/DL (ref 12–16)
LYMPHOCYTES NFR BLD MANUAL: 34 % (ref 18–48)
MAGNESIUM SERPL-MCNC: 1.4 MG/DL (ref 1.6–2.6)
MCH RBC QN AUTO: 29.6 PG (ref 27–31)
MCHC RBC AUTO-ENTMCNC: 32.7 G/DL (ref 32–36)
MCV RBC AUTO: 91 FL (ref 82–98)
MONOCYTES NFR BLD MANUAL: 4 % (ref 4–15)
NEUTROPHILS NFR BLD MANUAL: 60 % (ref 38–73)
NUCLEATED RBC (/100WBC) (OHS): 0 /100 WBC
PLATELET # BLD AUTO: 261 K/UL (ref 150–450)
PLATELET BLD QL SMEAR: NORMAL
PMV BLD AUTO: 10.3 FL (ref 9.2–12.9)
POTASSIUM SERPL-SCNC: 3.8 MMOL/L (ref 3.5–5.1)
PROT SERPL-MCNC: 6.4 GM/DL (ref 6–8.4)
RBC # BLD AUTO: 3.88 M/UL (ref 4–5.4)
SODIUM SERPL-SCNC: 143 MMOL/L (ref 136–145)
WBC # BLD AUTO: 5.29 K/UL (ref 3.9–12.7)

## 2025-08-25 PROCEDURE — 36415 COLL VENOUS BLD VENIPUNCTURE: CPT | Mod: PN

## 2025-08-25 PROCEDURE — 84460 ALANINE AMINO (ALT) (SGPT): CPT

## 2025-08-25 PROCEDURE — 80197 ASSAY OF TACROLIMUS: CPT

## 2025-08-25 PROCEDURE — 85027 COMPLETE CBC AUTOMATED: CPT

## 2025-08-25 PROCEDURE — 83735 ASSAY OF MAGNESIUM: CPT

## 2025-08-26 LAB — TACROLIMUS BLD-MCNC: 38.9 NG/ML (ref 5–15)

## (undated) DEVICE — DISSECTOR 5MM ENDOPATH

## (undated) DEVICE — DRESSING TRANS 4X4 TEGADERM

## (undated) DEVICE — SUT MCRYL PLUS 4-0 PS2 27IN

## (undated) DEVICE — NDL SPINAL 18GX3.5 SPINOCAN

## (undated) DEVICE — SUT VICRYL CTD 2-0 GI 27 SH

## (undated) DEVICE — STAPLE TRI-STAPLE 2.0 CRV TIP

## (undated) DEVICE — TIP YANKAUERS BULB NO VENT

## (undated) DEVICE — ELECTRODE REM PLYHSV RETURN 9

## (undated) DEVICE — DRAIN CHAN RND HUBLS 8MM 24FR

## (undated) DEVICE — Device

## (undated) DEVICE — SUT 2-0 VICRYL / CT-1

## (undated) DEVICE — DRESSING TRANS 2X2 TEGADERM

## (undated) DEVICE — DRAPE INCISE IOBAN 2 23X17IN

## (undated) DEVICE — TUBING SUC UNIV W/CONN 12FT

## (undated) DEVICE — SUT 0 30IN ETHIBOND EXCEL G

## (undated) DEVICE — ELECTRODE BLADE INSULATED 1 IN

## (undated) DEVICE — GAUZE DRAIN N WVN 6PLY 4X4IN

## (undated) DEVICE — BAG TISS RETRV MONARCH 10MM

## (undated) DEVICE — SYR ONLY LUER LOCK 20CC

## (undated) DEVICE — ADAPTER SWIVEL

## (undated) DEVICE — TAPE SILK 3IN

## (undated) DEVICE — PAD CURAD NONADH 3X4IN

## (undated) DEVICE — RELOAD ENDO GIA TRISTAPLE 45MM

## (undated) DEVICE — GLOVE BIOGEL ORTHOPEDIC 7.5

## (undated) DEVICE — DRAPE ABDOMINAL TIBURON 14X11

## (undated) DEVICE — KIT ANTIFOG W/SPONG & FLUID

## (undated) DEVICE — CATH THORACIC 28FR ST

## (undated) DEVICE — SUT 0 VICRYL / CT-1

## (undated) DEVICE — SUT MONOCRYL UD 4-0 27 PS-1

## (undated) DEVICE — TRAY MINOR GEN SURG OMC

## (undated) DEVICE — DRAIN CHEST DRY SUCTION

## (undated) DEVICE — STAPLER GIA HANDLE STD

## (undated) DEVICE — BLADE ELECTRO EDGE INSULATED

## (undated) DEVICE — GAUZE WOVEN STRL 12-PLY 4X4IN